# Patient Record
Sex: MALE | Race: BLACK OR AFRICAN AMERICAN | NOT HISPANIC OR LATINO | Employment: OTHER | URBAN - METROPOLITAN AREA
[De-identification: names, ages, dates, MRNs, and addresses within clinical notes are randomized per-mention and may not be internally consistent; named-entity substitution may affect disease eponyms.]

---

## 2017-01-03 ENCOUNTER — EPISODE CHANGES (OUTPATIENT)
Dept: HEPATOLOGY | Facility: CLINIC | Age: 77
End: 2017-01-03

## 2017-01-04 ENCOUNTER — EPISODE CHANGES (OUTPATIENT)
Dept: HEPATOLOGY | Facility: CLINIC | Age: 77
End: 2017-01-04

## 2017-01-04 ENCOUNTER — TELEPHONE (OUTPATIENT)
Dept: HEPATOLOGY | Facility: CLINIC | Age: 77
End: 2017-01-04

## 2017-01-04 ENCOUNTER — LAB VISIT (OUTPATIENT)
Dept: LAB | Facility: HOSPITAL | Age: 77
End: 2017-01-04
Attending: INTERNAL MEDICINE
Payer: MEDICARE

## 2017-01-04 DIAGNOSIS — B18.2 CHRONIC HEPATITIS C WITHOUT HEPATIC COMA: ICD-10-CM

## 2017-01-04 LAB
ALBUMIN SERPL BCP-MCNC: 3.6 G/DL
ALP SERPL-CCNC: 64 IU/L
ALT SERPL W/O P-5'-P-CCNC: 19 IU/L
ANION GAP SERPL CALC-SCNC: 10 MMOL/L
AST SERPL-CCNC: 23 IU/L
BILIRUB SERPL-MCNC: 0.7 MG/DL
BUN SERPL-MCNC: 21 MG/DL
CALCIUM SERPL-MCNC: 9.1 MG/DL
CHLORIDE SERPL-SCNC: 104 MMOL/L
CO2 SERPL-SCNC: 27 MMOL/L
CREAT SERPL-MCNC: 1.47 MG/DL
EST. GFR  (AFRICAN AMERICAN): 53.2 ML/MIN/1.73 M^2
EST. GFR  (NON AFRICAN AMERICAN): 46 ML/MIN/1.73 M^2
GLUCOSE SERPL-MCNC: 96 MG/DL
POTASSIUM SERPL-SCNC: 4.5 MMOL/L
PROT SERPL-MCNC: 7.3 G/DL
SODIUM SERPL-SCNC: 141 MMOL/L

## 2017-01-04 PROCEDURE — 80053 COMPREHEN METABOLIC PANEL: CPT | Mod: PO

## 2017-01-04 PROCEDURE — 36415 COLL VENOUS BLD VENIPUNCTURE: CPT | Mod: PO

## 2017-01-04 NOTE — TELEPHONE ENCOUNTER
HCV LAB REVIEW  Week 8 of Harvoni, planning on 12 weeks treatment      Pertinent labs:  CMP: stable, creatinine 1.47      pls call pt:  Labs look good. He looks mildly dehydrated and I would recommend he increase his water intake. 4 weeks left!    - Continue Harvoni - 1 pill daily - don't miss any doses.    Next labs due / pls schedule:  - CMP, HCV RNA at week 12 - end of treatment - 01/31/17

## 2017-01-05 NOTE — TELEPHONE ENCOUNTER
I spoke with patient and message from KALEE Campbell relayed.  It was stressed that he should hydrate.  EOT labs already scheduled.

## 2017-01-31 ENCOUNTER — EPISODE CHANGES (OUTPATIENT)
Dept: HEPATOLOGY | Facility: CLINIC | Age: 77
End: 2017-01-31

## 2017-01-31 ENCOUNTER — LAB VISIT (OUTPATIENT)
Dept: LAB | Facility: HOSPITAL | Age: 77
End: 2017-01-31
Attending: INTERNAL MEDICINE
Payer: MEDICARE

## 2017-01-31 DIAGNOSIS — B18.2 CHRONIC HEPATITIS C WITHOUT HEPATIC COMA: ICD-10-CM

## 2017-01-31 LAB
ALBUMIN SERPL BCP-MCNC: 3.8 G/DL
ALP SERPL-CCNC: 76 IU/L
ALT SERPL W/O P-5'-P-CCNC: 21 IU/L
ANION GAP SERPL CALC-SCNC: 9 MMOL/L
AST SERPL-CCNC: 22 IU/L
BILIRUB SERPL-MCNC: 0.5 MG/DL
BUN SERPL-MCNC: 21 MG/DL
CALCIUM SERPL-MCNC: 9.1 MG/DL
CHLORIDE SERPL-SCNC: 105 MMOL/L
CO2 SERPL-SCNC: 28 MMOL/L
CREAT SERPL-MCNC: 1.48 MG/DL
EST. GFR  (AFRICAN AMERICAN): 52.7 ML/MIN/1.73 M^2
EST. GFR  (NON AFRICAN AMERICAN): 45.6 ML/MIN/1.73 M^2
GLUCOSE SERPL-MCNC: 122 MG/DL
POTASSIUM SERPL-SCNC: 4.3 MMOL/L
PROT SERPL-MCNC: 7.7 G/DL
SODIUM SERPL-SCNC: 142 MMOL/L

## 2017-01-31 PROCEDURE — 80053 COMPREHEN METABOLIC PANEL: CPT | Mod: PO

## 2017-01-31 PROCEDURE — 87522 HEPATITIS C REVRS TRNSCRPJ: CPT | Mod: PO

## 2017-02-02 LAB
HCV LOG: <1.08 LOG (10) IU/ML
HCV RNA QUANT PCR: <12 IU/ML
HCV, QUALITATIVE: NOT DETECTED IU/ML

## 2017-02-03 ENCOUNTER — TELEPHONE (OUTPATIENT)
Dept: HEPATOLOGY | Facility: CLINIC | Age: 77
End: 2017-02-03

## 2017-02-03 DIAGNOSIS — K74.00 LIVER FIBROSIS: Primary | ICD-10-CM

## 2017-02-03 DIAGNOSIS — B18.2 CHRONIC HEPATITIS C WITHOUT HEPATIC COMA: ICD-10-CM

## 2017-02-03 NOTE — TELEPHONE ENCOUNTER
HCV LAB REVIEW  Week 12 of Harvoni, End of treatment       Pertinent labs:  CMP: stable, creatinine 1.48  HCV RNA: <12, not detected    pls call pt:  Labs look good. His HCV was not detected by the lab. We will check it again in 6 weeks and 12 weeks to determine a cure. There's still a small chance it could return. Fingers crossed. He should be out of Harvoni by this point, if not, take the few remaining doses left. He will be due for liver cancer screening 03/2017- please schedule     Next labs due/Please schedule:  HCV RNA, CBC, CMP, PT/INR, AFP, HBsAb, HBcAb, HAV IgG and U/S  in 6 weeks-SVR 6: 03/14/17  HCV RNA in 12 weeks- SVR 12:04/25/17

## 2017-03-02 ENCOUNTER — TELEPHONE (OUTPATIENT)
Dept: SURGERY | Facility: CLINIC | Age: 77
End: 2017-03-02

## 2017-03-02 NOTE — TELEPHONE ENCOUNTER
03/02/17 1624  Attempted to contact the pt regarding clinic visit reschedule due to Dr. Palumbo being in surgery. No answer. Left voicemail to return call. Will attempt to contact again at a later time.

## 2017-03-06 ENCOUNTER — TELEPHONE (OUTPATIENT)
Dept: SURGERY | Facility: CLINIC | Age: 77
End: 2017-03-06

## 2017-03-06 NOTE — TELEPHONE ENCOUNTER
03/06/17 1118  Pt called in requesting to reschedule 3 month follow up r/t exploratory lap due to transportation service needing notification 3 days prior to . Appointment rescheduled for 03/13/17 at 1:40pm. Date and time confirmed. Pt verbalized understanding.

## 2017-03-06 NOTE — TELEPHONE ENCOUNTER
03/06/17 1100  Spoke to pt regarding rescheduling post op appointment due to Dr. Palumbo being in surgery. Appointment rescheduled for 03/08/17 at 1pm. Date and time confirmed. Pt verbalized understanding.

## 2017-03-09 ENCOUNTER — TELEPHONE (OUTPATIENT)
Dept: SURGERY | Facility: CLINIC | Age: 77
End: 2017-03-09

## 2017-03-09 NOTE — TELEPHONE ENCOUNTER
03/09/17 1152  Attempted to contact the pt regardiing rescheduling post op appointment. No answer. Left voicemail to return call to the office. Will attempt to contact again at a later time.

## 2017-03-09 NOTE — TELEPHONE ENCOUNTER
03/09/17 1419  Spoke to pt regarding rescheduling appt on 3/13/17. Appt rescheduled for 03/17/17 at 11am. Pt states the transportation bus may decline him due to so many reschedules. I provided the number to the office, and informed the pt that if he had any trouble to tell them to call the office, and we could explain to them regarding appt rescheduling.

## 2017-03-14 ENCOUNTER — TELEPHONE (OUTPATIENT)
Dept: HEPATOLOGY | Facility: CLINIC | Age: 77
End: 2017-03-14

## 2017-03-14 ENCOUNTER — EPISODE CHANGES (OUTPATIENT)
Dept: HEPATOLOGY | Facility: CLINIC | Age: 77
End: 2017-03-14

## 2017-03-14 NOTE — TELEPHONE ENCOUNTER
Pt was scheduled to have labs drawn for today (3/14/17). Pt was NO SHOW. MA called pt to remind him of lab appt. Call unsuccessful. LVM with contact number for pt to return call.Lp

## 2017-03-15 ENCOUNTER — TELEPHONE (OUTPATIENT)
Dept: SURGERY | Facility: CLINIC | Age: 77
End: 2017-03-15

## 2017-03-15 NOTE — TELEPHONE ENCOUNTER
03/15/17 1606  Spoke to pt and informed him that Dr. Palumbo went on maternity leave a little earlier than expected, and that the  03/17/17 appt would need to be rescheduled. Pt states this is the 3rd time he has been rescheduled, but didn't know that  Palumbo was pregnant. I informed the pt that he would be seeing Dr. Lerner, and that we had an opening on 03/28/17 in the afternoon. Pt chose 03/28/17 at 1:20pm. Date and time confirmed. Pt verbalized understanding.

## 2017-03-21 NOTE — TELEPHONE ENCOUNTER
PT was  no show for lab & u/s appt that was scheduled for 3/14/17. MA called pt to reschedule missed appts. Call unsuccessful. Unable to leave voice message. No show reminder letter mailed on 3/21/17.Lp

## 2017-03-28 ENCOUNTER — TELEPHONE (OUTPATIENT)
Dept: SURGERY | Facility: CLINIC | Age: 77
End: 2017-03-28

## 2017-03-28 NOTE — TELEPHONE ENCOUNTER
03/28/17 1424  Spoke to pt regarding missed appt on 03/28/17 with Dr. Lerner. Pt states he got busy, and couldn't make it back in time. Appt rescheduled for 04/13/17 at 2:20pm. Date and time of appt confirmed. Pt verbalized understanding, and requests that a letter be sent to remind him. Pt informed that I would send the letter, but that I wasn't sure if it would reach him in time to set up transportation. Pt verbalized understanding, and states if not just give him a call to remind him so he can call transportation which needs notice for  3 days prior to appt. Letter sent today.

## 2017-03-29 ENCOUNTER — HOSPITAL ENCOUNTER (OUTPATIENT)
Dept: RADIOLOGY | Facility: HOSPITAL | Age: 77
Discharge: HOME | End: 2017-03-29
Attending: INTERNAL MEDICINE
Payer: MEDICARE

## 2017-03-29 ENCOUNTER — TELEPHONE (OUTPATIENT)
Dept: HEPATOLOGY | Facility: CLINIC | Age: 77
End: 2017-03-29

## 2017-03-29 DIAGNOSIS — K74.00 LIVER FIBROSIS: ICD-10-CM

## 2017-03-29 DIAGNOSIS — B18.2 CHRONIC HEPATITIS C WITHOUT HEPATIC COMA: ICD-10-CM

## 2017-03-29 PROCEDURE — 76705 ECHO EXAM OF ABDOMEN: CPT | Mod: TC,PO

## 2017-03-29 NOTE — TELEPHONE ENCOUNTER
Eden with the lab called to report a panic glucose reading of 48.  I spoke with patient.  He had blood drawn at [facility] on 3/29/17.  He states that he was fasting prior to draw.  He reported feeling week this am but states that he went straight home and ate a full breakfast.  He reports feeling great now and states that he's about to go dancing.  Patient unable to provide a glucose reading because he doesn't have test strips.

## 2017-04-03 ENCOUNTER — EPISODE CHANGES (OUTPATIENT)
Dept: HEPATOLOGY | Facility: CLINIC | Age: 77
End: 2017-04-03

## 2017-04-03 ENCOUNTER — TELEPHONE (OUTPATIENT)
Dept: HEPATOLOGY | Facility: CLINIC | Age: 77
End: 2017-04-03

## 2017-04-03 DIAGNOSIS — K74.00 LIVER FIBROSIS: Primary | ICD-10-CM

## 2017-04-03 NOTE — TELEPHONE ENCOUNTER
HCV LAB REVIEW  SVR 6 and HCC screening       Pertinent labs:  CMP: stable   CMP: stable, glucose <50, pt called   PT/INR: WNL  AFP: WNL  (+) immunity to HAV and HBV, does not need vaccination   HCV RNA: <12, not detected    U/S stable with no masses or lesions     pls call pt:  Labs look good. His HCV was not detected by the lab. We will check it again in 6 weeks  to determine a cure. There's still a small chance it could return. His liver labs and U/S were fine. He will be due for repeat liver cancer screening in 6 months.     Next labs due/Please schedule:  HCV RNA in 12 weeks- SVR 12:04/25/17    HCC screening and follow up visit with me: 09/2017

## 2017-04-10 ENCOUNTER — OFFICE VISIT (OUTPATIENT)
Dept: SURGERY | Facility: CLINIC | Age: 77
End: 2017-04-10
Payer: MEDICARE

## 2017-04-10 VITALS
WEIGHT: 190.69 LBS | HEART RATE: 64 BPM | DIASTOLIC BLOOD PRESSURE: 86 MMHG | SYSTOLIC BLOOD PRESSURE: 135 MMHG | TEMPERATURE: 98 F | BODY MASS INDEX: 28.24 KG/M2 | HEIGHT: 69 IN

## 2017-04-10 DIAGNOSIS — B18.2 CHRONIC HEPATITIS C WITHOUT HEPATIC COMA: ICD-10-CM

## 2017-04-10 DIAGNOSIS — M62.08 DIASTASIS RECTI: ICD-10-CM

## 2017-04-10 DIAGNOSIS — K43.2 INCISIONAL HERNIA, WITHOUT OBSTRUCTION OR GANGRENE: Primary | ICD-10-CM

## 2017-04-10 DIAGNOSIS — R10.33 PERIUMBILICAL ABDOMINAL PAIN: ICD-10-CM

## 2017-04-10 DIAGNOSIS — Z90.49 S/P COLECTOMY: ICD-10-CM

## 2017-04-10 PROCEDURE — 99215 OFFICE O/P EST HI 40 MIN: CPT | Mod: S$GLB,,, | Performed by: SURGERY

## 2017-04-10 PROCEDURE — 1125F AMNT PAIN NOTED PAIN PRSNT: CPT | Mod: S$GLB,,, | Performed by: SURGERY

## 2017-04-10 PROCEDURE — 1160F RVW MEDS BY RX/DR IN RCRD: CPT | Mod: S$GLB,,, | Performed by: SURGERY

## 2017-04-10 PROCEDURE — 1159F MED LIST DOCD IN RCRD: CPT | Mod: S$GLB,,, | Performed by: SURGERY

## 2017-04-10 PROCEDURE — 3079F DIAST BP 80-89 MM HG: CPT | Mod: S$GLB,,, | Performed by: SURGERY

## 2017-04-10 PROCEDURE — 1157F ADVNC CARE PLAN IN RCRD: CPT | Mod: S$GLB,,, | Performed by: SURGERY

## 2017-04-10 PROCEDURE — 99999 PR PBB SHADOW E&M-EST. PATIENT-LVL III: CPT | Mod: PBBFAC,,, | Performed by: SURGERY

## 2017-04-10 PROCEDURE — 4010F ACE/ARB THERAPY RXD/TAKEN: CPT | Mod: S$GLB,,, | Performed by: SURGERY

## 2017-04-10 PROCEDURE — 3046F HEMOGLOBIN A1C LEVEL >9.0%: CPT | Mod: S$GLB,,, | Performed by: SURGERY

## 2017-04-10 PROCEDURE — 3075F SYST BP GE 130 - 139MM HG: CPT | Mod: S$GLB,,, | Performed by: SURGERY

## 2017-04-10 NOTE — MR AVS SNAPSHOT
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 [phone]    2017 10:30 AM RVPH CT1 LIMIT 350 LBS [facility] [phone]      Goals (5 Years of Data)     None      [facility]     [facility] Nurse Care Line -  Assistance  Unless otherwise directed by your provider, please contact [facility], our nurse care line that is available for  assistance.     Registered nurses in the [facility] provide: appointment scheduling, clinical advisement, health education, and other advisory services.  Call: [phone] (toll free)               Medications           Message regarding Medications     Verify the changes and/or additions to your medication regime listed below are the same as discussed with your clinician today.  If any of these changes or additions are incorrect, please notify your healthcare provider.             Verify that the below list of medications is an accurate representation of the medications you are currently taking.  If none reported, the list may be blank. If incorrect, please contact your healthcare provider. Carry this list with you in case of emergency.           Current  "Medications     amlodipine (NORVASC) 10 MG tablet Take 1 tablet (10 mg total) by mouth once daily.    aspirin (ECOTRIN) 325 MG EC tablet Take 325 mg by mouth once daily.    BD INSULIN PEN NEEDLE UF SHORT 31 gauge x 5/16" Ndle     gabapentin (NEURONTIN) 300 MG capsule One po hs x 5 days then one twice daily for back pain/sciatica    hydrocodone-acetaminophen 7.5-325mg (NORCO) 7.5-325 mg per tablet Take 1 tablet by mouth every 6 to 8 hours as needed for Pain.    ibuprofen (ADVIL,MOTRIN) 800 MG tablet Take 1 tablet (800 mg total) by mouth every 6 (six) hours as needed for Pain.    insulin glargine (LANTUS) 100 unit/mL injection Inject 15 Units into the skin every evening.    insulin syringe-needle U-100 0.3 mL 30 gauge x 5/16 Syrg     losartan (COZAAR) 50 MG tablet Take 1 tablet (50 mg total) by mouth once daily.    simvastatin (ZOCOR) 40 MG tablet Take 1 tablet (40 mg total) by mouth every evening.    sitagliptin (JANUVIA) 100 MG Tab Take 1 tablet (100 mg total) by mouth once daily.    docusate sodium (COLACE) 100 MG capsule Take 1 capsule (100 mg total) by mouth 2 (two) times daily as needed for Constipation.           Clinical Reference Information           Your Vitals Were     BP Pulse Temp Height Weight BMI    135/86 (BP Location: Right arm, Patient Position: Sitting) 64 97.9 °F (36.6 °C) (Oral) 5' 9" (1.753 m) 86.5 kg (190 lb 11.2 oz) 28.16 kg/m2      Blood Pressure          Most Recent Value    BP  135/86      Allergies as of 4/10/2017     No Known Allergies      Immunizations Administered on Date of Encounter - 4/10/2017     None      Orders Placed During Today's Visit     Future Labs/Procedures Expected by Expires    CT Abdomen Pelvis  Without Contrast  4/10/2017 4/10/2018    Hemoglobin A1c  4/10/2017 6/9/2018      [facility] Sign-Up     Activating your [facility] account is as easy as 1-2-3!     1) Visit [email or web address], select Sign Up Now, enter this activation code and your date of birth, then select " Next.  [email or web address]  Expires: 5/25/2017  1:49 PM      2) Create a username and password to use when you visit [employer] in the future and select a security question in case you lose your password and select Next.    3) Enter your e-mail address and click Sign Up!    Additional Information  If you have questions, please e-mail [email or web address] or call [phone] to talk to our [facility] staff. Remember, [facility] is NOT to be used for urgent needs. For medical emergencies, dial 911.         Smoking Cessation     If you would like to quit smoking:   You may be eligible for free services if you are a [place] resident and started smoking cigarettes before September 1, 1988.  Call the Smoking Cessation Trust ([facility]) toll free at (702) 689-6812 or (545) 875-6178.   Call 1-800-QUIT-NOW if you do not meet the above criteria.   Contact us via email: [email or web address]   View our website for more information: [email or web address]        Language Assistance Services     ATTENTION: Language assistance services are available, free of charge. Please call [phone].      ATENCIÓN: Si habla ashokañol, tiene a encarnacion disposición servicios gratuitos de asistencia lingüística. Llame al [phone].     CHÚ Ý: N?u b?n nói Ti?ng Vi?t, có các d?ch v? h? tr? ngôn ng? mi?n phí dành cho b?n. G?i s? [phone].         [facility] complies with applicable Federal civil rights laws and does not discriminate on the basis of race, color, national origin, age, disability, or sex.

## 2017-04-10 NOTE — PROGRESS NOTES
Subjective:       Patient ID: Jose Roca is a 76 y.o. male.    Chief Complaint: No chief complaint on file.    HPI   Patient is status post ex-lap with Dr. Palumbo, September 2016.  He reports noticing a bulge to the left and above his umbilicus approximately 3 months ago.  He has intermittent discomfort in this region when he coughs.  No nausea or vomiting.  Normal daily bowel movements, without exertion.  Passing flatus.  He has completed his hepatitis C treatment of Harvoni.  He states his weight has been up and down, but has a good appetite.  He has diabetes and checks his glucose only at night.  He has no other complaints and presents alone to clinic today.    Review of Systems    All systems were reviewed and are negative, except that mentioned in the HPI.    Objective:      Physical Exam   Constitutional: He is oriented to person, place, and time. He appears well-developed and well-nourished. No distress.   HENT:   Head: Normocephalic and atraumatic.   Eyes: No scleral icterus.   Neck: Normal range of motion. Neck supple. No JVD present.   Cardiovascular: Normal rate and regular rhythm.    Pulmonary/Chest: Effort normal and breath sounds normal. No respiratory distress.   Abdominal: Soft. Bowel sounds are normal. He exhibits no distension and no mass. There is no tenderness. There is no rebound and no guarding. Hernia: left-sided reducible hernia palpated above the umbilicus, ~3cm x 3cm, nttp, no overlying skin changes    Well-healed midline incision noted.  Diastases recti noted.   Musculoskeletal: Normal range of motion. He exhibits no edema or tenderness.   Neurological: He is alert and oriented to person, place, and time. No cranial nerve deficit.   Skin: Skin is warm and dry. He is not diaphoretic.   Psychiatric: He has a normal mood and affect.         Lab Results   Component Value Date    WBC 9.64 03/29/2017    HGB 13.1 (L) 03/29/2017    HCT 40.0 03/29/2017    MCV 89 03/29/2017     03/29/2017      CMP  Sodium   Date Value Ref Range Status   03/29/2017 143 136 - 145 mmol/L Final     Potassium   Date Value Ref Range Status   03/29/2017 4.4 3.5 - 5.1 mmol/L Final     Chloride   Date Value Ref Range Status   03/29/2017 104 95 - 110 mmol/L Final     CO2   Date Value Ref Range Status   03/29/2017 30 (H) 23 - 29 mmol/L Final     Glucose   Date Value Ref Range Status   03/29/2017 48 (LL) 70 - 110 mg/dL Final     Comment:     Glucose critical result(s) called and verbal readback obtained from   Nayla Gaytan., 03/29/2017 14:24       BUN ([place])   Date Value Ref Range Status   03/05/2015 12 2 - 20 mg/dL Final     BUN, Bld   Date Value Ref Range Status   03/29/2017 18 2 - 20 mg/dL Final     Creatinine   Date Value Ref Range Status   03/29/2017 1.46 (H) 0.50 - 1.40 mg/dL Final     Calcium   Date Value Ref Range Status   03/29/2017 9.3 8.7 - 10.5 mg/dL Final     Total Protein   Date Value Ref Range Status   03/29/2017 7.8 6.0 - 8.4 g/dL Final     Albumin   Date Value Ref Range Status   03/29/2017 4.0 3.5 - 5.2 g/dL Final     Total Bilirubin   Date Value Ref Range Status   03/29/2017 1.0 0.1 - 1.0 mg/dL Final     Comment:     For infants and newborns, interpretation of results should be based  on gestational age, weight and in agreement with clinical  observations.  Premature Infant recommended reference ranges:  Up to 24 hours.............<8.0 mg/dL  Up to 48 hours............<12.0 mg/dL  3-5 days..................<15.0 mg/dL  6-29 days.................<15.0 mg/dL       Alkaline Phosphatase   Date Value Ref Range Status   03/29/2017 72 38 - 126 IU/L Final     AST   Date Value Ref Range Status   03/29/2017 21 15 - 46 IU/L Final     ALT   Date Value Ref Range Status   03/29/2017 21 10 - 44 IU/L Final     Anion Gap   Date Value Ref Range Status   03/29/2017 9 8 - 16 mmol/L Final     eGFR if    Date Value Ref Range Status   03/29/2017 53.6 (A) >60 mL/min/1.73 m^2 Final     eGFR if non     Date Value Ref Range Status   03/29/2017 46.4 (A) >60 mL/min/1.73 m^2 Final     Comment:     Calculation used to obtain the estimated glomerular filtration  rate (eGFR) is the CKD-EPI equation. Since race is unknown   in our information system, the eGFR values for   -American and Non--American patients are given   for each creatinine result.         Lab Results   Component Value Date    HGBA1C 9.2 (H) 09/20/2016         U/S 3/29/17: images and report reviewed.  The gallbladder is absent.  Otherwise, normal study.    Assessment:       1. Incisional hernia, without obstruction or gangrene    2. S/P colectomy    3. Chronic hepatitis C without hepatic coma    4. 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 Periumbilical abdominal pain    6. Diastasis recti        Plan:   Patient is known to Dr. 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  We discussed the pathology of hernias.  I encouraged him to perform Accu-Checks more frequently, ideally 4 times a day, not just qhs.  Hemoglobin A1c was ordered at this visit.  We also discussed weight loss.  I will order a CT with oral contrast to further evaluate his abdominal wall and incisional hernia.  We will call him with the results and he will follow-up with Dr. Palumbo in 4-6 weeks.  We discussed signs and symptoms of hernia incarceration and strangulation.  He will call or return to clinic sooner with any questions or problems. All of his questions were answered.

## 2017-04-25 ENCOUNTER — EPISODE CHANGES (OUTPATIENT)
Dept: HEPATOLOGY | Facility: CLINIC | Age: 77
End: 2017-04-25

## 2017-04-26 ENCOUNTER — EPISODE CHANGES (OUTPATIENT)
Dept: HEPATOLOGY | Facility: CLINIC | Age: 77
End: 2017-04-26

## 2017-05-17 ENCOUNTER — TELEPHONE (OUTPATIENT)
Dept: HEPATOLOGY | Facility: CLINIC | Age: 77
End: 2017-05-17

## 2017-05-17 ENCOUNTER — EPISODE CHANGES (OUTPATIENT)
Dept: HEPATOLOGY | Facility: CLINIC | Age: 77
End: 2017-05-17

## 2017-05-17 NOTE — TELEPHONE ENCOUNTER
Patient did not have SVR 12 labs drawn on 4/25/17 as ordered.  Letter sent requesting that he call our office to reschedule draw.

## 2017-05-22 ENCOUNTER — TELEPHONE (OUTPATIENT)
Dept: HEPATOLOGY | Facility: CLINIC | Age: 77
End: 2017-05-22

## 2017-05-22 NOTE — TELEPHONE ENCOUNTER
----- Message from Annie Arenas sent at 5/19/2017 12:24 PM CDT -----  Contact: pt   Calling to reschedule 4/28 appts, please call

## 2017-05-22 NOTE — TELEPHONE ENCOUNTER
I spoke with patient.  He asked that CT and HCV RNA be scheduled to 6/2/17; done and reminder notice mailed.

## 2017-07-06 ENCOUNTER — TELEPHONE (OUTPATIENT)
Dept: HEPATOLOGY | Facility: CLINIC | Age: 77
End: 2017-07-06

## 2017-07-06 ENCOUNTER — EPISODE CHANGES (OUTPATIENT)
Dept: HEPATOLOGY | Facility: CLINIC | Age: 77
End: 2017-07-06

## 2017-07-06 NOTE — TELEPHONE ENCOUNTER
He was a no show for SVR 12 labs, please attempt to reschedule. HCC screening will be due with follow up in 09/2017, please enter recall

## 2017-07-12 ENCOUNTER — EPISODE CHANGES (OUTPATIENT)
Dept: HEPATOLOGY | Facility: CLINIC | Age: 77
End: 2017-07-12

## 2017-07-12 ENCOUNTER — LAB VISIT (OUTPATIENT)
Dept: LAB | Facility: HOSPITAL | Age: 77
End: 2017-07-12
Attending: INTERNAL MEDICINE
Payer: MEDICARE

## 2017-07-12 DIAGNOSIS — B18.2 CHRONIC HEPATITIS C WITHOUT HEPATIC COMA: ICD-10-CM

## 2017-07-12 DIAGNOSIS — K74.00 LIVER FIBROSIS: ICD-10-CM

## 2017-07-12 PROCEDURE — 36415 COLL VENOUS BLD VENIPUNCTURE: CPT | Mod: PO

## 2017-07-12 PROCEDURE — 87522 HEPATITIS C REVRS TRNSCRPJ: CPT | Mod: PO

## 2017-07-17 LAB
HCV LOG: <1.08 LOG (10) IU/ML
HCV RNA QUANT PCR: <12 IU/ML
HCV, QUALITATIVE: NOT DETECTED IU/ML

## 2017-07-18 ENCOUNTER — TELEPHONE (OUTPATIENT)
Dept: HEPATOLOGY | Facility: CLINIC | Age: 77
End: 2017-07-18

## 2017-07-18 DIAGNOSIS — Z86.19 HISTORY OF HEPATITIS C: Primary | ICD-10-CM

## 2017-07-18 NOTE — TELEPHONE ENCOUNTER
HCV LAB REVIEW  SVR 23    Pertinent labs:  HCV RNA: <12, not detected    Results discussed with patient. Pt sounded intoxicated and reported he was going drinking for birthday. Reminded of liver fibrosis and cautioned alcohol use.     Next labs due/Please schedule:  HCV RNA in 1 year- SVR 12:04/25/17  HCC screening already scheduled

## 2017-09-14 ENCOUNTER — OFFICE VISIT (OUTPATIENT)
Dept: HEPATOLOGY | Facility: CLINIC | Age: 77
End: 2017-09-14
Payer: MEDICARE

## 2017-09-14 ENCOUNTER — TELEPHONE (OUTPATIENT)
Dept: HEPATOLOGY | Facility: CLINIC | Age: 77
End: 2017-09-14

## 2017-09-14 ENCOUNTER — HOSPITAL ENCOUNTER (OUTPATIENT)
Dept: RADIOLOGY | Facility: HOSPITAL | Age: 77
Discharge: HOME | End: 2017-09-14
Attending: INTERNAL MEDICINE
Payer: MEDICARE

## 2017-09-14 VITALS
OXYGEN SATURATION: 98 % | SYSTOLIC BLOOD PRESSURE: 150 MMHG | TEMPERATURE: 97 F | HEART RATE: 63 BPM | HEIGHT: 70 IN | DIASTOLIC BLOOD PRESSURE: 76 MMHG | RESPIRATION RATE: 18 BRPM | WEIGHT: 204.13 LBS | BODY MASS INDEX: 29.22 KG/M2

## 2017-09-14 DIAGNOSIS — K74.00 LIVER FIBROSIS: ICD-10-CM

## 2017-09-14 DIAGNOSIS — Z86.19 HISTORY OF HEPATITIS C: Primary | ICD-10-CM

## 2017-09-14 PROCEDURE — 1159F MED LIST DOCD IN RCRD: CPT | Mod: S$GLB,,, | Performed by: PHYSICIAN ASSISTANT

## 2017-09-14 PROCEDURE — 3078F DIAST BP <80 MM HG: CPT | Mod: S$GLB,,, | Performed by: PHYSICIAN ASSISTANT

## 2017-09-14 PROCEDURE — 76705 ECHO EXAM OF ABDOMEN: CPT | Mod: TC

## 2017-09-14 PROCEDURE — 3008F BODY MASS INDEX DOCD: CPT | Mod: S$GLB,,, | Performed by: PHYSICIAN ASSISTANT

## 2017-09-14 PROCEDURE — 76705 ECHO EXAM OF ABDOMEN: CPT | Mod: 26,,, | Performed by: RADIOLOGY

## 2017-09-14 PROCEDURE — 1125F AMNT PAIN NOTED PAIN PRSNT: CPT | Mod: S$GLB,,, | Performed by: PHYSICIAN ASSISTANT

## 2017-09-14 PROCEDURE — 3077F SYST BP >= 140 MM HG: CPT | Mod: S$GLB,,, | Performed by: PHYSICIAN ASSISTANT

## 2017-09-14 PROCEDURE — 99214 OFFICE O/P EST MOD 30 MIN: CPT | Mod: S$GLB,,, | Performed by: PHYSICIAN ASSISTANT

## 2017-09-14 PROCEDURE — 99999 PR PBB SHADOW E&M-EST. PATIENT-LVL IV: CPT | Mod: PBBFAC,,, | Performed by: PHYSICIAN ASSISTANT

## 2017-09-14 NOTE — TELEPHONE ENCOUNTER
Attempt made to reach patient.  Message from KALEE Campbell mailed to him.  Recall entered for 3/2018 HCC screening.

## 2017-09-14 NOTE — TELEPHONE ENCOUNTER
----- Message from Dom Campbell PA-C sent at 9/14/2017  3:22 PM CDT -----  Please let him know these labs are stable.

## 2017-09-14 NOTE — TELEPHONE ENCOUNTER
----- Message from Dom Campbell PA-C sent at 9/14/2017  2:04 PM CDT -----  Please let him know that his U/S from today looks fine. He will be due for repeat in 6 months    Thanks
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
## 2018-01-16 ENCOUNTER — HOSPITAL ENCOUNTER (EMERGENCY)
Facility: HOSPITAL | Age: 78
Discharge: HOME | End: 2018-01-16
Attending: EMERGENCY MEDICINE
Payer: MEDICARE

## 2018-01-16 VITALS
DIASTOLIC BLOOD PRESSURE: 76 MMHG | WEIGHT: 197 LBS | HEART RATE: 65 BPM | BODY MASS INDEX: 28.2 KG/M2 | TEMPERATURE: 98 F | OXYGEN SATURATION: 100 % | SYSTOLIC BLOOD PRESSURE: 131 MMHG | RESPIRATION RATE: 20 BRPM | HEIGHT: 70 IN

## 2018-01-16 DIAGNOSIS — R42 VERTIGO: Primary | ICD-10-CM

## 2018-01-16 DIAGNOSIS — J32.9 CHRONIC SINUSITIS, UNSPECIFIED LOCATION: ICD-10-CM

## 2018-01-16 LAB — POCT GLUCOSE: 151 MG/DL (ref 70–110)

## 2018-01-16 PROCEDURE — 82962 GLUCOSE BLOOD TEST: CPT

## 2018-01-16 PROCEDURE — 99284 EMERGENCY DEPT VISIT MOD MDM: CPT | Mod: 25

## 2018-01-16 RX ORDER — MECLIZINE HYDROCHLORIDE 25 MG/1
25 TABLET ORAL 3 TIMES DAILY PRN
Qty: 20 TABLET | Refills: 0 | Status: SHIPPED | OUTPATIENT
Start: 2018-01-16 | End: 2021-10-08

## 2018-01-16 RX ORDER — AMOXICILLIN AND CLAVULANATE POTASSIUM 875; 125 MG/1; MG/1
1 TABLET, FILM COATED ORAL 2 TIMES DAILY
Qty: 20 TABLET | Refills: 0 | Status: SHIPPED | OUTPATIENT
Start: 2018-01-16 | End: 2018-01-26

## 2018-01-16 RX ORDER — FLUTICASONE PROPIONATE 50 MCG
1 SPRAY, SUSPENSION (ML) NASAL DAILY
Qty: 15 G | Refills: 0 | Status: SHIPPED | OUTPATIENT
Start: 2018-01-16 | End: 2022-03-28

## 2018-01-16 NOTE — ED TRIAGE NOTES
Pt presents to ED via [facility] with c/o dizziness for 3 days, pt states that it feels like the room is spinning. Denies nausea, denies vomiting. Denies cp or sob.

## 2018-01-16 NOTE — ED PROVIDER NOTES
Encounter Date: 1/16/2018       History     Chief Complaint   Patient presents with    Dizziness     arrives per EMS with dizziness x 3 days. described as everything is spinning.      This patient is a 77-year-old male.  He has a history of diabetes mellitus.  No prior history of cervical vascular disease.  For the last 3 days, has been experiencing vertigo, feels like the room is spinning.  No associated nausea, vomiting, weakness, paresthesias, difficulty speaking, or new visual disturbance.  He is blind in the right eye, but has not had any changes in vision of his left eye.  No other neurologic symptoms.  Has not had a headache, no head trauma.  No fevers chills stiff neck or rash.  Has not been experiencing any URI symptoms, denies congestion, cough, wheezing, or flulike symptoms.          Review of patient's allergies indicates:  No Known Allergies  Past Medical History:   Diagnosis Date    Diabetes mellitus, type 2     High cholesterol     History of hepatitis C; S/p RX with SVR 23 documented 07/2017 7/18/2017    Hypertension      Past Surgical History:   Procedure Laterality Date    CHOLECYSTECTOMY      CORONARY STENT PLACEMENT      EXPLORATORY LAPAROTOMY W/ BOWEL RESECTION  09/19/2016    ileocecectomy     History reviewed. No pertinent family history.  Social History   Substance Use Topics    Smoking status: Current Every Day Smoker     Packs/day: 0.50     Types: Cigarettes    Smokeless tobacco: Never Used      Comment: APPROX 3 CIGARETTES A DAY    Alcohol use No     Review of Systems   Constitutional: Negative for chills and fever.   HENT: Negative for congestion, ear pain, rhinorrhea and sore throat.    Eyes: Negative for visual disturbance.        Blind in the right eye   Respiratory: Negative for cough, shortness of breath and wheezing.    Cardiovascular: Negative for chest pain, palpitations and leg swelling.   Gastrointestinal: Negative for abdominal pain, diarrhea, nausea and vomiting.    Endocrine: Negative for polyuria.        Insulin-dependent diabetes   Genitourinary: Negative for difficulty urinating and dysuria.   Musculoskeletal: Negative for arthralgias and myalgias.   Skin: Negative for rash.   Allergic/Immunologic: Positive for immunocompromised state (diabetic).   Neurological: Positive for dizziness. Negative for seizures, syncope, weakness and numbness.   Hematological: Does not bruise/bleed easily.        No history of malignancy   Psychiatric/Behavioral: Negative for confusion.       Physical Exam     Initial Vitals [01/16/18 1409]   BP Pulse Resp Temp SpO2   (!) 153/94 76 19 98.2 °F (36.8 °C) 97 %      MAP       113.67         Physical Exam    Nursing note and vitals reviewed.  Constitutional: He appears well-developed and well-nourished. He is not diaphoretic. No distress.   HENT:   Head: Normocephalic.   Right Ear: External ear normal.   Left Ear: External ear normal.   Nose: Nose normal.   Mouth/Throat: Oropharynx is clear and moist.   No sinus tenderness over the paranasal sinuses, or over the mastoids   Eyes:   Right cornea is opacified  Left pupil round and reactive   Neck: No JVD present.   Cardiovascular: Normal rate, regular rhythm, normal heart sounds and intact distal pulses.   No murmur heard.  Pulmonary/Chest: Breath sounds normal. No stridor. No respiratory distress. He has no wheezes.   Abdominal: Soft. He exhibits no distension. There is no tenderness.   Musculoskeletal: He exhibits no edema or tenderness.   Neurological: He is alert. He has normal strength. No cranial nerve deficit or sensory deficit.   There is no nystagmus, EOM of the left eye are intact.  No facial numbness.  Tongue protrudes in the midline.  Normal shoulder shrug.  Equal  strength, able to lift each leg off the bed forcefully against resistance, normal sensation to light touch all 4 extremities   Skin: Skin is warm and dry.         ED Course   Procedures  Labs Reviewed - No data to display           Medical Decision Making:   Initial Assessment:   This patient has developed some vertigo, which is been constant for the last 3 days.  CT scan of the head shows no acute hemorrhage, no evidence of acute infarct.  He does have some maxillary sinusitis, and fluid in the mastoids on both sides.  He has not been experiencing congestion, facial pain, fevers, chills, or ear pain to coincide with the findings on CT, so this probably represents chronic sinusitis.  I will place him on Augmentin, Flonase, and he should follow-up with Dr. Bennett in 2 days.  Meclizine as needed for vertigo.                 Imaging Results          CT Head Without Contrast (Final result)  Result time 01/16/18 14:58:21    Final result by Juanjose Kenney MD (01/16/18 14:58:21)                 Impression:         Fluid level is seen within the left maxillary sinuses which could reflect acute sinusitis.    Fluid within the mastoid air cells, left greater than right which may account for the vertigo and may reflect chronic mastoiditis.    All CT scans at this facility use dose modulation, iterative reconstruction and/or weight based dosing when appropriate to reduce radiation dose to as low as reasonably achievable.        Electronically signed by: JUANJOSE KENNEY MD  Date:     01/16/18  Time:    14:58              Narrative:    CT OF THE HEAD WITHOUT CONTRAST:     Technique: 5 mm axial images were obtained from the skullbase to the vertex, without administration of contrast.    Comparison: None.    History:  Vertigo    Findings:    No intracranial hemorrhage, extra-axial fluid collection, midline shift, or mass effect.  No evidence of an acute cortical infarct or of an infarct in a major vascular territory.    There is mild prominence of the ventricles and sulci compatible with diffuse cerebral volume loss.  Patchy hypoattenuation in the periventricular white matter regions is nonspecific, but most commonly seen with chronic microvascular  ischemic changes. Vascular calcifications noted.    The calvarium is unremarkable. Fluid level is seen within the left maxillary sinuses which could reflect acute sinusitis. Remaining visualized sinuses are grossly clear. There is fluid within the mastoid air cells, left greater than right which may account for the vertigo and may reflect chronic mastoiditis. Visualized orbits are unremarkable.                               ED Course      Clinical Impression:   The primary encounter diagnosis was Vertigo. A diagnosis of Chronic sinusitis, unspecified location was also pertinent to this visit.    Disposition:   Disposition: Discharged  Condition: Stable                        Atif Nice MD  01/16/18 6470

## 2018-01-22 ENCOUNTER — TELEPHONE (OUTPATIENT)
Dept: HEPATOLOGY | Facility: CLINIC | Age: 78
End: 2018-01-22

## 2018-01-22 NOTE — TELEPHONE ENCOUNTER
----- Message from Mariam Melton sent at 1/22/2018  1:10 PM CST -----  Contact: Pt  Please call patient to schedule appt in March.     Call

## 2018-03-21 ENCOUNTER — HOSPITAL ENCOUNTER (OUTPATIENT)
Dept: RADIOLOGY | Facility: HOSPITAL | Age: 78
Discharge: HOME | End: 2018-03-21
Attending: PHYSICIAN ASSISTANT
Payer: MEDICARE

## 2018-03-21 ENCOUNTER — OFFICE VISIT (OUTPATIENT)
Dept: HEPATOLOGY | Facility: CLINIC | Age: 78
End: 2018-03-21
Payer: MEDICARE

## 2018-03-21 VITALS
SYSTOLIC BLOOD PRESSURE: 161 MMHG | HEART RATE: 58 BPM | OXYGEN SATURATION: 100 % | TEMPERATURE: 96 F | DIASTOLIC BLOOD PRESSURE: 78 MMHG | WEIGHT: 210.31 LBS | BODY MASS INDEX: 30.11 KG/M2 | HEIGHT: 70 IN | RESPIRATION RATE: 18 BRPM

## 2018-03-21 DIAGNOSIS — Z86.19 HISTORY OF HEPATITIS C: ICD-10-CM

## 2018-03-21 DIAGNOSIS — K74.00 LIVER FIBROSIS: ICD-10-CM

## 2018-03-21 DIAGNOSIS — K74.60 CIRRHOSIS OF LIVER WITHOUT ASCITES, UNSPECIFIED HEPATIC CIRRHOSIS TYPE: Primary | ICD-10-CM

## 2018-03-21 PROCEDURE — 3077F SYST BP >= 140 MM HG: CPT | Mod: CPTII,S$GLB,, | Performed by: PHYSICIAN ASSISTANT

## 2018-03-21 PROCEDURE — 99999 PR PBB SHADOW E&M-EST. PATIENT-LVL IV: CPT | Mod: PBBFAC,,, | Performed by: PHYSICIAN ASSISTANT

## 2018-03-21 PROCEDURE — 76705 ECHO EXAM OF ABDOMEN: CPT | Mod: 26,,, | Performed by: RADIOLOGY

## 2018-03-21 PROCEDURE — 99214 OFFICE O/P EST MOD 30 MIN: CPT | Mod: S$GLB,,, | Performed by: PHYSICIAN ASSISTANT

## 2018-03-21 PROCEDURE — 76705 ECHO EXAM OF ABDOMEN: CPT | Mod: TC

## 2018-03-21 PROCEDURE — 3078F DIAST BP <80 MM HG: CPT | Mod: CPTII,S$GLB,, | Performed by: PHYSICIAN ASSISTANT

## 2018-03-21 RX ORDER — VALSARTAN AND HYDROCHLOROTHIAZIDE 160; 12.5 MG/1; MG/1
1 TABLET, FILM COATED ORAL DAILY
COMMUNITY
Start: 2018-02-23 | End: 2018-03-21 | Stop reason: CLARIF

## 2018-03-21 RX ORDER — VALSARTAN AND HYDROCHLOROTHIAZIDE 160; 12.5 MG/1; MG/1
1 TABLET, FILM COATED ORAL DAILY
Status: ON HOLD | COMMUNITY
End: 2018-09-05

## 2018-03-21 RX ORDER — GLIMEPIRIDE 4 MG/1
4 TABLET ORAL DAILY
Status: ON HOLD | COMMUNITY
Start: 2018-01-30 | End: 2018-11-26 | Stop reason: HOSPADM
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
## 2018-03-22 ENCOUNTER — TELEPHONE (OUTPATIENT)
Dept: HEPATOLOGY | Facility: CLINIC | Age: 78
End: 2018-03-22

## 2018-03-22 NOTE — TELEPHONE ENCOUNTER
I spoke with patient and message from KALEE Campbell relayed.  It was stressed that he increase fluids and f/u with PCP regarding elevated kidney numbers.

## 2018-04-03 ENCOUNTER — TELEPHONE (OUTPATIENT)
Dept: GASTROENTEROLOGY | Facility: CLINIC | Age: 78
End: 2018-04-03

## 2018-06-12 ENCOUNTER — TELEPHONE (OUTPATIENT)
Dept: GASTROENTEROLOGY | Facility: CLINIC | Age: 78
End: 2018-06-12

## 2018-08-14 ENCOUNTER — OFFICE VISIT (OUTPATIENT)
Dept: SURGERY | Facility: CLINIC | Age: 78
End: 2018-08-14
Payer: MEDICARE

## 2018-08-14 VITALS
OXYGEN SATURATION: 98 % | HEART RATE: 72 BPM | SYSTOLIC BLOOD PRESSURE: 126 MMHG | BODY MASS INDEX: 29.45 KG/M2 | HEIGHT: 70 IN | TEMPERATURE: 99 F | DIASTOLIC BLOOD PRESSURE: 76 MMHG | WEIGHT: 205.69 LBS

## 2018-08-14 DIAGNOSIS — F17.200 SMOKER: ICD-10-CM

## 2018-08-14 DIAGNOSIS — R10.9 ABDOMINAL PAIN, UNSPECIFIED ABDOMINAL LOCATION: Primary | ICD-10-CM

## 2018-08-14 DIAGNOSIS — Z12.11 SCREEN FOR COLON CANCER: ICD-10-CM

## 2018-08-14 DIAGNOSIS — M62.08 DIASTASIS RECTI: ICD-10-CM

## 2018-08-14 DIAGNOSIS — Z90.49 S/P COLECTOMY: ICD-10-CM

## 2018-08-14 DIAGNOSIS — B18.2 CHRONIC HEPATITIS C WITHOUT HEPATIC COMA: ICD-10-CM

## 2018-08-14 DIAGNOSIS — Z98.890 S/P EXPLORATORY LAPAROTOMY: ICD-10-CM

## 2018-08-14 DIAGNOSIS — R10.33 PERIUMBILICAL ABDOMINAL PAIN: ICD-10-CM

## 2018-08-14 DIAGNOSIS — K42.9 UMBILICAL HERNIA WITHOUT OBSTRUCTION AND WITHOUT GANGRENE: ICD-10-CM

## 2018-08-14 DIAGNOSIS — K43.2 INCISIONAL HERNIA, WITHOUT OBSTRUCTION OR GANGRENE: ICD-10-CM

## 2018-08-14 PROCEDURE — 3078F DIAST BP <80 MM HG: CPT | Mod: CPTII,S$GLB,, | Performed by: SURGERY

## 2018-08-14 PROCEDURE — 99215 OFFICE O/P EST HI 40 MIN: CPT | Mod: S$GLB,,, | Performed by: SURGERY

## 2018-08-14 PROCEDURE — 3074F SYST BP LT 130 MM HG: CPT | Mod: CPTII,S$GLB,, | Performed by: SURGERY

## 2018-08-14 PROCEDURE — 99999 PR PBB SHADOW E&M-EST. PATIENT-LVL III: CPT | Mod: PBBFAC,,, | Performed by: SURGERY

## 2018-08-14 NOTE — PROGRESS NOTES
Subjective:      Patient ID: Jose Roca is a 78 y.o. male.    Chief Complaint: Follow-up (ex lap 2016)   Patient presents for evaluation of abdominal hernias.  He is known to me from follow-up during Dr. jeffery maternity leave.  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  He is an uncontrolled diabetic and smoker.  He has chronic hepatitis C and has been followed by hepatology Clinic.  His last titers were undetectable last year.  Reports that he developed a hernia after his ex lap and that is gotten worse.  He was supposed to have an ultrasound of this earlier this year but he declined.  He states he does not want to have an ultrasound but will have a CT scan.  No nausea or vomiting.  No fevers or chills.  Occasional constipation, no diarrhea.  No obvious skin changes.  Patient requests surgery to repair the hernia soon as possible.  No other complaints.    Past Medical History:   Diagnosis Date    Diabetes mellitus, type 2     High cholesterol     History of hepatitis C; S/p RX with SVR 23 documented 07/2017 7/18/2017    Hypertension      Past Surgical History:   Procedure Laterality Date    CHOLECYSTECTOMY      CORONARY STENT PLACEMENT      EXPLORATORY LAPAROTOMY W/ BOWEL RESECTION  09/19/2016    ileocecectomy     History reviewed. No pertinent family history.  Social History     Socioeconomic History    Marital status: Single     Spouse name: None    Number of children: None    Years of education: None    Highest education level: None   Social Needs    Financial resource strain: None    Food insecurity - worry: None    Food insecurity - inability: None    Transportation needs - medical: None    Transportation needs - non-medical: None   Occupational History    None   Tobacco Use    Smoking status: Current Every Day Smoker     Packs/day: 0.50     Types: Cigarettes    Smokeless tobacco: Never Used    Tobacco comment: APPROX 3 CIGARETTES A DAY   Substance and Sexual Activity  "   Alcohol use: No    Drug use: Yes     Types: Marijuana     Comment: medical    Sexual activity: None   Other Topics Concern    None   Social History Narrative    None       Current Outpatient Medications   Medication Sig Dispense Refill    aspirin (ECOTRIN) 325 MG EC tablet Take 325 mg by mouth once daily.      BD INSULIN PEN NEEDLE UF SHORT 31 gauge x 5/16" Ndle       fluticasone (FLONASE) 50 mcg/actuation nasal spray 1 spray (50 mcg total) by Each Nare route once daily. 15 g 0    gabapentin (NEURONTIN) 300 MG capsule One po hs x 5 days then one twice daily for back pain/sciatica 90 capsule 0    glimepiride (AMARYL) 4 MG tablet Take 4 mg by mouth once daily.      ibuprofen (ADVIL,MOTRIN) 800 MG tablet Take 1 tablet (800 mg total) by mouth every 6 (six) hours as needed for Pain. 50 tablet 0    insulin glargine (LANTUS) 100 unit/mL injection Inject 15 Units into the skin every evening. 5 vial 5    insulin syringe-needle U-100 0.3 mL 30 gauge x 5/16 Syrg       meclizine (ANTIVERT) 25 mg tablet Take 1 tablet (25 mg total) by mouth 3 (three) times daily as needed for Dizziness. 20 tablet 0    sitagliptin (JANUVIA) 100 MG Tab Take 1 tablet (100 mg total) by mouth once daily. 30 tablet 5    valsartan-hydrochlorothiazide (DIOVAN-HCT) 160-12.5 mg per tablet Take 1 tablet by mouth once daily.      amlodipine (NORVASC) 10 MG tablet Take 1 tablet (10 mg total) by mouth once daily. 30 tablet 5    simvastatin (ZOCOR) 40 MG tablet Take 1 tablet (40 mg total) by mouth every evening. 30 tablet 5     No current facility-administered medications for this visit.      Review of patient's allergies indicates:  No Known Allergies    ROS:  All systems were reviewed and are negative, except that mentioned in the HPI.    Objective:     Vitals:    08/14/18 1415   BP: 126/76   Pulse: 72   Temp: 98.8 °F (37.1 °C)   SpO2: 98%   Weight: 93.3 kg (205 lb 11 oz)   Height: 5' 10" (1.778 m)     Physical Exam   Constitutional: He " is oriented to person, place, and time. He appears well-developed and well-nourished. No distress.   HENT:   Head: Normocephalic and atraumatic.   Eyes: Conjunctivae and EOM are normal. Pupils are equal, round, and reactive to light. No scleral icterus.   Neck: Normal range of motion. Neck supple. No JVD present.   Cardiovascular: Normal rate and regular rhythm.   Pulmonary/Chest: Effort normal and breath sounds normal. No respiratory distress.   Abdominal: Soft. Bowel sounds are normal. He exhibits no distension. There is tenderness (Over periumbilical and supraumbilical region of hernia). There is no rebound and no guarding. A hernia ( periumbilical and supraumbilical region, no overlying skin changes, mildly tender to palpation, partially reducible) is present.   Musculoskeletal: Normal range of motion. He exhibits no edema or tenderness.   Neurological: He is alert and oriented to person, place, and time. No cranial nerve deficit.   Skin: Skin is warm and dry. He is not diaphoretic.   Psychiatric: He has a normal mood and affect.       Lab Review: CBC:   Lab Results   Component Value Date    WBC 10.01 03/21/2018    RBC 4.63 03/21/2018    HGB 13.7 (L) 03/21/2018    HCT 41.5 03/21/2018     03/21/2018     BMP:   Lab Results   Component Value Date     03/21/2018     03/21/2018    K 4.9 03/21/2018     03/21/2018    CO2 26 03/21/2018    BUN 26 (H) 03/21/2018    BUN 12 03/05/2015    CREATININE 1.9 (H) 03/21/2018    CALCIUM 9.3 03/21/2018     Lab Results   Component Value Date    ALT 20 03/21/2018    AST 22 03/21/2018    ALKPHOS 71 03/21/2018    BILITOT 0.7 03/21/2018     Lab Results   Component Value Date    HGBA1C 9.2 (H) 09/20/2016       Diagnostics Review:  U/S 9/14/2017 images and reports were personally reviewed.  The report states:  Liver: Small measuring 12 cm cm. Homogeneous echotexture. No focal hepatic lesions.  Gallbladder: Removed  Biliary system: The common duct is not dilated,  measuring 2 mm.  No intrahepatic ductal dilatation.  Spleen measures 7 cm in length and there is a trace of ascites present    Echo 09/2016:  CONCLUSIONS     1 - Normal left ventricular systolic function (EF 60-65%).     2 - Normal left ventricular diastolic function.     3 - Normal right ventricular systolic function .     4 - The estimated PA systolic pressure is 23 mmHg.   Assessment:     1. Abdominal pain, unspecified abdominal location    2. S/P exploratory laparotomy    3. Umbilical hernia without obstruction and without gangrene    4. Diastasis recti    5. S/P colectomy    6. Chronic hepatitis C without hepatic coma    7. Incisional hernia, without obstruction or gangrene    8. 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 Periumbilical abdominal pain    10. Smoker    11. Screen for colon cancer      Plan:     Patient's records were reviewed extensively.  Will contact hepatology for recommendations regarding surgery/general anesthesia. He was last seen by hepatology in March 2018 is due for follow-up in September.  His HCV titer in July 2017 was less than 1.08.  We will also obtain a CT to further evaluate the abdominal wall and contents of the hernia. Records release was signed and we will request recent blood work from his PCP, especially a more recent hemoglobin A1c as it was quite elevated when last documented in our system (9.2 in 2006).  All of his questions were answered.  Patient indicated that he would like to proceed with surgery soon as possible as it is very uncomfortable.  Of note he has never had a colonoscopy and refuses colonoscopy at this time.  He takes aspirin daily and we will ask his primary care provider to provide a preoperative evaluation.

## 2018-08-16 ENCOUNTER — HOSPITAL ENCOUNTER (OUTPATIENT)
Dept: RADIOLOGY | Facility: HOSPITAL | Age: 78
Discharge: HOME | End: 2018-08-16
Attending: SURGERY
Payer: MEDICARE

## 2018-08-16 DIAGNOSIS — K42.9 UMBILICAL HERNIA WITHOUT OBSTRUCTION AND WITHOUT GANGRENE: ICD-10-CM

## 2018-08-16 DIAGNOSIS — R10.9 ABDOMINAL PAIN, UNSPECIFIED ABDOMINAL LOCATION: ICD-10-CM

## 2018-08-16 DIAGNOSIS — Z98.890 S/P EXPLORATORY LAPAROTOMY: ICD-10-CM

## 2018-08-16 PROCEDURE — 74176 CT ABD & PELVIS W/O CONTRAST: CPT | Mod: TC,PO

## 2018-08-16 PROCEDURE — 25500020 PHARM REV CODE 255: Mod: PO | Performed by: SURGERY

## 2018-08-16 RX ADMIN — IOHEXOL 30 ML: 300 INJECTION, SOLUTION INTRAVENOUS at 07:08

## 2018-08-17 ENCOUNTER — TELEPHONE (OUTPATIENT)
Dept: HEPATOLOGY | Facility: CLINIC | Age: 78
End: 2018-08-17

## 2018-08-17 NOTE — TELEPHONE ENCOUNTER
MA called patient his friend answer his phone and said that he will have patient to call us back and schedule his appt.

## 2018-08-17 NOTE — TELEPHONE ENCOUNTER
----- Message from Dom Campbell PA-C sent at 8/17/2018  8:11 AM CDT -----  Please schedule in hepatology clinic to discuss surgical risk  Thanks   ----- Message -----  From: Ariella Lerner DO  Sent: 8/16/2018   6:49 PM  To: Dom Campbell PA-C    [facility],  This patient has 2 abdominal hernias, 1 containing colon and small bowel which he is causing great discomfort.  He would like surgery as soon as possible.  Would you like to see him preoperatively?  Do think he is high risk from the liver standpoint?  Thank you,  Annalee Lerner

## 2018-08-24 ENCOUNTER — OFFICE VISIT (OUTPATIENT)
Dept: SURGERY | Facility: CLINIC | Age: 78
End: 2018-08-24
Payer: MEDICARE

## 2018-08-24 VITALS
DIASTOLIC BLOOD PRESSURE: 60 MMHG | BODY MASS INDEX: 28.84 KG/M2 | WEIGHT: 201 LBS | OXYGEN SATURATION: 97 % | SYSTOLIC BLOOD PRESSURE: 108 MMHG | TEMPERATURE: 98 F | HEART RATE: 68 BPM

## 2018-08-24 DIAGNOSIS — F17.200 SMOKER: ICD-10-CM

## 2018-08-24 DIAGNOSIS — K43.2 INCISIONAL HERNIA, WITHOUT OBSTRUCTION OR GANGRENE: Primary | ICD-10-CM

## 2018-08-24 DIAGNOSIS — E11.42 DM TYPE 2 WITH DIABETIC PERIPHERAL NEUROPATHY: ICD-10-CM

## 2018-08-24 DIAGNOSIS — Z86.19 HISTORY OF HEPATITIS C: ICD-10-CM

## 2018-08-24 DIAGNOSIS — I48.91 LONE ATRIAL FIBRILLATION: ICD-10-CM

## 2018-08-24 DIAGNOSIS — Z01.810 PREOP CARDIOVASCULAR EXAM: ICD-10-CM

## 2018-08-24 DIAGNOSIS — Z98.890 S/P EXPLORATORY LAPAROTOMY: ICD-10-CM

## 2018-08-24 DIAGNOSIS — Z79.82 ASPIRIN LONG-TERM USE: ICD-10-CM

## 2018-08-24 PROCEDURE — 99215 OFFICE O/P EST HI 40 MIN: CPT | Mod: S$GLB,,, | Performed by: SURGERY

## 2018-08-24 PROCEDURE — 99999 PR PBB SHADOW E&M-EST. PATIENT-LVL V: CPT | Mod: PBBFAC,,, | Performed by: SURGERY

## 2018-08-24 PROCEDURE — 3078F DIAST BP <80 MM HG: CPT | Mod: CPTII,S$GLB,, | Performed by: SURGERY

## 2018-08-24 PROCEDURE — 3074F SYST BP LT 130 MM HG: CPT | Mod: CPTII,S$GLB,, | Performed by: SURGERY

## 2018-08-24 RX ORDER — LIDOCAINE HYDROCHLORIDE 10 MG/ML
1 INJECTION, SOLUTION EPIDURAL; INFILTRATION; INTRACAUDAL; PERINEURAL ONCE
Status: CANCELLED | OUTPATIENT
Start: 2018-08-24 | End: 2018-08-24

## 2018-08-24 NOTE — PROGRESS NOTES
Subjective:       Patient ID: Jose Roca is a 78 y.o. male.    Chief Complaint: Hernia (test results)      Previous HPI:  Patient presents for evaluation of abdominal hernias.  He is known to me from follow-up during Dr. Palumbo's maternity leave.  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  He is an uncontrolled diabetic and smoker.  He has HCV and has been followed by hepatology Clinic.  His last titers were undetectable last year.  Reports that he developed a hernia after his ex lap and that is gotten worse.  He was supposed to have an ultrasound of this earlier this year but he declined.  He states he does not want to have an ultrasound but will have a CT scan.  No nausea or vomiting.  No fevers or chills.  Occasional constipation, no diarrhea.  No obvious skin changes.  Patient requests surgery to repair the hernia soon as possible.  No other complaints    Current HPI:   Smokes 2 cigarettes daily, no abdominal pain presently (and for past 2d). No constipation. No n/v. No f/c. No new c/o.    Review of Systems    All systems were reviewed and are negative, except that mentioned in the HPI.    Objective:      Physical Exam   Constitutional: He is oriented to person, place, and time. He appears well-developed and well-nourished. No distress.   HENT:   Head: Normocephalic and atraumatic.   Neck: Normal range of motion. Neck supple. No JVD present.   Cardiovascular: Normal rate and regular rhythm.   Pulmonary/Chest: Effort normal and breath sounds normal. No respiratory distress.   Abdominal: Soft. Bowel sounds are normal. He exhibits no distension. A hernia (incisional hernia x 2, nttp, reducible, no skin changes) is present.   Musculoskeletal: Normal range of motion. He exhibits no edema or tenderness.   Neurological: He is alert and oriented to person, place, and time. No cranial nerve deficit.   Skin: Skin is warm and dry. He is not diaphoretic.   Psychiatric: He has a normal mood and  affect.         Lab Results   Component Value Date    WBC 10.01 03/21/2018    HGB 13.7 (L) 03/21/2018    HCT 41.5 03/21/2018    MCV 90 03/21/2018     03/21/2018     BMP  Lab Results   Component Value Date     03/21/2018    K 4.9 03/21/2018     03/21/2018    CO2 26 03/21/2018    BUN 26 (H) 03/21/2018    CREATININE 1.9 (H) 03/21/2018    CALCIUM 9.3 03/21/2018    ANIONGAP 6 (L) 03/21/2018    ESTGFRAFRICA 39 (A) 03/21/2018    EGFRNONAA 33 (A) 03/21/2018     Lab Results   Component Value Date    ALT 20 03/21/2018    AST 22 03/21/2018    ALKPHOS 71 03/21/2018    BILITOT 0.7 03/21/2018     Lab Results   Component Value Date    HGBA1C 9.2 (H) 09/20/2016 June 2018 HgA1c 6.2 (scanned into media file)    CT A/P 8/16/2018 images and report personally reviewed.  Since the prior study of 09/19/2016, there has been interval development of a supraumbilical hernia containing colon and small bowel protruding through a 5.9 cm wide neck, without evidence of complication. There is also a left periumbilical fat-containing hernia protruding through a 4.6 cm neck, also uncomplicated.    Echo 9/2016:  CONCLUSIONS     1 - Normal left ventricular systolic function (EF 60-65%).     2 - Normal left ventricular diastolic function.     3 - Normal right ventricular systolic function .     4 - The estimated PA systolic pressure is 23 mmHg.     Assessment:       1. Incisional hernia, without obstruction or gangrene    2. S/P exploratory laparotomy    3. Preop cardiovascular exam    4. Smoker    5. DM type 2 with diabetic peripheral neuropathy    6. Lone atrial fibrillation    7. History of hepatitis C; S/p RX with SVR 23 documented 07/2017    8. Aspirin long-term use        Plan:       The pathology of the patient's disease was discussed: large incisional hernia x2 containing bowel. Written handouts were explained and given to the patient.  Elective hybrid (open and lap) hernia repair with mesh placement was recommended. The  surgical procedure, risks, postop recovery and consent were discussed. All questions were answered and the consent was signed. Signs and symptoms of worsening disease was discussed.  The patient will call or go to ER should those symptoms develop.  Not taking ASA. Echo noted. Tolerated colon surgery well in 2016. Will contact PCP for preop eval. Surgery planned for 9/5/18 by patient request. Patient was informed that he would need a ride to/from the hospital.  He will work on this and we will contact social work for other options.

## 2018-09-05 PROBLEM — K43.2 INCISIONAL HERNIA, WITHOUT OBSTRUCTION OR GANGRENE: Status: ACTIVE | Noted: 2018-09-05

## 2018-09-06 PROBLEM — F17.200 SMOKER: Status: ACTIVE | Noted: 2018-09-06

## 2018-09-06 PROBLEM — R33.8 POSTOPERATIVE URINARY RETENTION: Status: ACTIVE | Noted: 2018-09-06

## 2018-09-06 PROBLEM — N99.89 POSTOPERATIVE URINARY RETENTION: Status: ACTIVE | Noted: 2018-09-06

## 2018-09-06 PROBLEM — E66.9 OBESITY, CLASS I, BMI 30-34.9: Status: ACTIVE | Noted: 2018-09-06

## 2018-09-09 DIAGNOSIS — R33.9 URINARY RETENTION: Primary | ICD-10-CM

## 2018-09-10 ENCOUNTER — HOSPITAL ENCOUNTER (EMERGENCY)
Facility: HOSPITAL | Age: 78
Discharge: HOME | End: 2018-09-10
Attending: EMERGENCY MEDICINE
Payer: MEDICARE

## 2018-09-10 ENCOUNTER — TELEPHONE (OUTPATIENT)
Dept: UROLOGY | Facility: CLINIC | Age: 78
End: 2018-09-10

## 2018-09-10 VITALS
TEMPERATURE: 98 F | WEIGHT: 200 LBS | RESPIRATION RATE: 16 BRPM | OXYGEN SATURATION: 98 % | HEIGHT: 69 IN | HEART RATE: 85 BPM | DIASTOLIC BLOOD PRESSURE: 94 MMHG | BODY MASS INDEX: 29.62 KG/M2 | SYSTOLIC BLOOD PRESSURE: 182 MMHG

## 2018-09-10 DIAGNOSIS — T83.9XXA PROBLEM WITH FOLEY CATHETER, INITIAL ENCOUNTER: Primary | ICD-10-CM

## 2018-09-10 PROCEDURE — 51702 INSERT TEMP BLADDER CATH: CPT

## 2018-09-10 PROCEDURE — 99284 EMERGENCY DEPT VISIT MOD MDM: CPT | Mod: 25

## 2018-09-10 NOTE — ED PROVIDER NOTES
"Encounter Date: 9/10/2018    SCRIBE #1 NOTE: I, Lee Green, am scribing for, and in the presence of,  Dr. Guy Lefort. I have scribed the entire note.       History     Chief Complaint   Patient presents with    Male  Problem     hernia repair by Dr. Lerner at [place] on 9/5, d/c with hutton cath, patient states it got pulled in his sleep last night and is now leaking, no blood     Jose Roca is a 78 y.o. male who  has a past medical history of Diabetes mellitus, type 2, High cholesterol, History of hepatitis C; S/p RX with SVR 23 documented 07/2017 (7/18/2017), and Hypertension.    The patient presents to the ED due to hutton catheter leak that occurred sometime last night. Patient had hernia repair performed by Dr. Lerner at [place] on 9/5/2018 and was discharged with hutton catheter. Pt reports catheter got pulled in his sleep last night and is now leaking. Reports no blood.  States woke up in bed "swimming pool of urine"  He denies any current pain or fever, abd pain.  Currently reports no symptoms or leaking of urine.        The history is provided by the patient.     Review of patient's allergies indicates:  No Known Allergies  Past Medical History:   Diagnosis Date    Diabetes mellitus, type 2     High cholesterol     History of hepatitis C; S/p RX with SVR 23 documented 07/2017 7/18/2017    Hypertension      Past Surgical History:   Procedure Laterality Date    CHOLECYSTECTOMY      CORONARY STENT PLACEMENT      EXPLORATORY LAPAROTOMY W/ BOWEL RESECTION  09/19/2016    ileocecectomy    EXPLORATORY-LAPAROTOMY N/A 9/19/2016    Performed by [facility]elia Palumbo [facility] at [facility] OR    ILEOCECECTOMY  9/19/2016    Performed by [facility]elia Palumbo MD at [facility] OR    LYSIS OF ADHESIONS N/A 9/5/2018    Procedure: LYSIS, ADHESIONS;  Surgeon: Ariella Lerner DO;  Location: [facility] OR;  Service: General;  Laterality: N/A;    LYSIS, ADHESIONS N/A 9/5/2018    Performed by Ariella Lerner DO at [facility] OR    " OMENTECTOMY N/A 9/5/2018    Procedure: OMENTECTOMY;  Surgeon: Ariella Lerner DO;  Location: [facility] OR;  Service: General;  Laterality: N/A;    OMENTECTOMY N/A 9/5/2018    Performed by Ariella Lerner DO at [facility] OR    REPAIR,HERNIA,INCISIONAL OR VENTRAL,WITHOUT HISTORY OF PRIOR REPAIR(MIDLINE HERNIA DEFECT 6CMx3.6CM) (LEFT LATERAL HERNIA DEFECT 4.5APr6ZN) (HYBRID) N/A 9/5/2018    Performed by Ariella Lerner DO at [facility] OR     History reviewed. No pertinent family history.  Social History     Tobacco Use    Smoking status: Current Every Day Smoker     Packs/day: 0.50     Types: Cigarettes    Smokeless tobacco: Never Used    Tobacco comment: APPROX 3 CIGARETTES A DAY   Substance Use Topics    Alcohol use: No    Drug use: Yes     Types: Marijuana     Comment: medical     Review of Systems   Constitutional: Negative for fever.   Gastrointestinal: Negative for nausea and vomiting.   Genitourinary: Negative for flank pain, penile pain, penile swelling and testicular pain.   Musculoskeletal: Negative for back pain.       Physical Exam     Initial Vitals [09/10/18 0735]   BP Pulse Resp Temp SpO2   (!) 148/88 90 16 99.4 °F (37.4 °C) 98 %      MAP       --         Physical Exam    Nursing note and vitals reviewed.  Constitutional: He appears well-developed and well-nourished. No distress.   HENT:   Head: Normocephalic and atraumatic.   Eyes: Conjunctivae and EOM are normal. Pupils are equal, round, and reactive to light.   Neck: Normal range of motion. Neck supple.   Cardiovascular: Normal rate.   Pulmonary/Chest: No respiratory distress.   Abdominal: He exhibits no distension.   Multiple abdominal Laproscopic Surgical site scars CDI, dressings intact   Genitourinary:   Genitourinary Comments: Umana in place, no penile trauma, no bloody discharge, no urine discharge. Leg bag attached with missing drainage cap.   Musculoskeletal: Normal range of motion.   Neurological: He is alert and oriented to person,  place, and time.   Skin: Skin is warm and dry.   Psychiatric: Thought content normal.         ED Course   Procedures  Labs Reviewed - No data to display       Imaging Results    None          Medical Decision Making:   Initial Assessment:   Comfortable, no complaints  Differential Diagnosis:   Urethral trauma, infection, retention, blood clot, equipment problems  ED Management:  Examination reveals Umana leg bag that has had the drainage calf removed.  This likely explains the patient's historical elements.  Upon questioning he states that he forgot to replace the cap.  Removed place the caput discharge the patient with continued scheduled follow-up                      Clinical Impression:     1. Problem with Umana catheter, initial encounter            Disposition:   Disposition: Discharged  Condition: Stable       I, Dr. Guy Lefort, personally performed the services described in this documentation. All medical record entries made by the scribe were at my direction and in my presence. I have reviewed the chart and agree that the record reflects my personal performance and is accurate and complete. Guy Lefort, MD.  8:08 AM 09/10/2018                   Guy J. Lefort, MD  09/10/18 0809

## 2018-09-10 NOTE — ED NOTES
APPEARANCE: Alert, oriented and in no acute distress.  CARDIAC: Normal rate and rhythm  PERIPHERAL VASCULAR: peripheral pulses present. Normal cap refill. No edema. Warm to touch.    RESPIRATORY: Respirations are equal and unlabored no obvious signs of distress.  GASTRO: bowel sounds normal, no tenderness. Clean dry intact dressings in place from recent hernia sx/no drainage noted    MUSC: No obvious deformiyt/ambualtes with cane   SKIN: Skin is warm,dry and intact   NEURO:  No neurological abnormalities.   MENTAL STATUS: awake, alert and aware of environment.  EYE: PERRL x2  GENITALIA: Normal external genitalia

## 2018-09-10 NOTE — TELEPHONE ENCOUNTER
----- Message from Ariella Lerner DO sent at 9/9/2018  3:54 PM CDT -----  Hello,  This patient underwent complex incisional hernia repairs with me at [facility] on 9/5.  He stayed several days, as expected for postop recovery.  He had significant postop urinary retention.  The hutton was removed POD 1 then replaced later that night with 1L residual, Flomax started then and Hutton kept in place then removed again POD 3 but pt still unable to void and Hutton replaced later that day and pt dc'd home with Hutton and new rx of Flomax.  Can you please help coordinate a f/u with Urology?   Thank you!  MARV

## 2018-09-10 NOTE — ED NOTES
Pt presents to the ED c/o catheter leaking after being pulled in his sleep.  Placed due to recent hernia repair sx/dc'd Saturday from  [facility] /Dr Lerner surgeon

## 2018-09-11 ENCOUNTER — PATIENT OUTREACH (OUTPATIENT)
Dept: ADMINISTRATIVE | Facility: CLINIC | Age: 78
End: 2018-09-11

## 2018-09-11 NOTE — PATIENT INSTRUCTIONS
After Laparoscopic Hernia Repair  You had a procedure called laparoscopic hernia repair. A hernia is a defect in the tough tissue covering the musculature of the abdominal wall (fascia). During laparoscopic hernia surgery, a surgeon inserts a telescope attached to a camera as well as surgical instruments through tiny incisions in your abdomen. The surgeon repairs the hernia with a mesh, which patches the tear or weakness in the fascia.  Home care  · Note that your shoulder may feel tight or your neck may be stiff for 24 to 48 hours after your surgery. This is common and usually lasts a short time. You may also have numbness around the incision area.  · Keep doing the coughing and deep breathing exercises that you learned in the hospital. These will help to prevent lung infection.  · Prevent constipation so you dont strain when going to the bathroom. Eat fruits, vegetables, and whole grains. Drink 6 to 8 glasses of water a day, unless otherwise directed. Use a laxative or a mild stool softener if your healthcare provider says its OK.  · Wash your incision with mild soap and water. Pat it dry. Dont use oil, powder, or lotion on your incision.  · Shower or take baths as instructed by your healthcare provider. Instructions will vary based on how your incision was closed and how its healing. It may be closed with glue, sutures, or staples. Your healthcare provider may have different advice for each kind.  Activity  · Ask others to help with chores and errands while you recover.  · Dont lift anything heavier than 10 pounds until your healthcare provider says its OK.  · Dont mow the lawn, use a vacuum , or do other strenuous activities until your healthcare provider says it's OK.  · Climb stairs slowly and pause after every few steps.  · Walk as often as you feel able.  · Ask your healthcare provider when you can drive again. This may be when you stop taking pain medicine and can move comfortably from side to  side. Dont drive if you are still taking opioid pain medicine.  When to call your healthcare provider   Call your healthcare provider right away if you have any of the following:  · Pain, bleeding, redness, or fluid at the incision site that gets worse  · Fever of 100.4°F (38°C) or higher, or as directed by your healthcare provider  · Vomiting or nausea that doesnt go away  · Inability to urinate  · No bowel movement after 3 days  · Swelling in abdomen or groin that gets worse  · Pain thats not relieved by medicine   Date Last Reviewed: 10/1/2016  © 5438-4765 [employer] [address] [place] [address], [place] [address]. All rights reserved. This information is not intended as a substitute for professional medical care. Always follow your healthcare professional's instructions.

## 2018-09-12 ENCOUNTER — TELEPHONE (OUTPATIENT)
Dept: SURGERY | Facility: CLINIC | Age: 78
End: 2018-09-12

## 2018-09-12 NOTE — TELEPHONE ENCOUNTER
----- Message from Ruthie Locke sent at 9/12/2018 10:21 AM CDT -----  Contact: [phone]  Patient called in returning your call. Please advise    9/12/18, 1039am - I spoke with patient regarding this message.  He didn't realize he already had an appointment with Urology, Monday 9/17/18 to remove the hutton cath.  I reminded him he also has an US and LABS scheduled at [facility] tomorrow, as well.  Pt verbalized understanding.

## 2018-09-13 ENCOUNTER — HOSPITAL ENCOUNTER (OUTPATIENT)
Dept: RADIOLOGY | Facility: HOSPITAL | Age: 78
Discharge: HOME | End: 2018-09-13
Attending: PHYSICIAN ASSISTANT
Payer: MEDICARE

## 2018-09-13 DIAGNOSIS — Z86.19 HISTORY OF HEPATITIS C: ICD-10-CM

## 2018-09-13 DIAGNOSIS — K74.60 CIRRHOSIS OF LIVER WITHOUT ASCITES, UNSPECIFIED HEPATIC CIRRHOSIS TYPE: ICD-10-CM

## 2018-09-13 PROCEDURE — 76705 ECHO EXAM OF ABDOMEN: CPT | Mod: TC,PO

## 2018-09-17 ENCOUNTER — HOSPITAL ENCOUNTER (INPATIENT)
Facility: HOSPITAL | Age: 78
LOS: 7 days | Discharge: HOME HEALTH | DRG: 699 | End: 2018-09-24
Attending: EMERGENCY MEDICINE | Admitting: SURGERY
Payer: MEDICARE

## 2018-09-17 DIAGNOSIS — E11.42 DM TYPE 2 WITH DIABETIC PERIPHERAL NEUROPATHY: Chronic | ICD-10-CM

## 2018-09-17 DIAGNOSIS — N40.1 BENIGN PROSTATIC HYPERPLASIA WITH URINARY RETENTION: ICD-10-CM

## 2018-09-17 DIAGNOSIS — A41.9 SEPSIS SECONDARY TO UTI: ICD-10-CM

## 2018-09-17 DIAGNOSIS — N99.89 POSTOPERATIVE URINARY RETENTION: ICD-10-CM

## 2018-09-17 DIAGNOSIS — M54.9 BACK PAIN WITH SCIATICA: ICD-10-CM

## 2018-09-17 DIAGNOSIS — R33.8 POSTOPERATIVE URINARY RETENTION: ICD-10-CM

## 2018-09-17 DIAGNOSIS — K43.2 INCISIONAL HERNIA, WITHOUT OBSTRUCTION OR GANGRENE: ICD-10-CM

## 2018-09-17 DIAGNOSIS — Z86.19 HISTORY OF HEPATITIS C: ICD-10-CM

## 2018-09-17 DIAGNOSIS — A41.9 SEPSIS: ICD-10-CM

## 2018-09-17 DIAGNOSIS — N39.0 URINARY TRACT INFECTION ASSOCIATED WITH INDWELLING URETHRAL CATHETER, SUBSEQUENT ENCOUNTER: ICD-10-CM

## 2018-09-17 DIAGNOSIS — F17.200 SMOKER: ICD-10-CM

## 2018-09-17 DIAGNOSIS — N39.0 URINARY TRACT INFECTION ASSOCIATED WITH INDWELLING URETHRAL CATHETER: Primary | ICD-10-CM

## 2018-09-17 DIAGNOSIS — T83.511A URINARY TRACT INFECTION ASSOCIATED WITH INDWELLING URETHRAL CATHETER: Primary | ICD-10-CM

## 2018-09-17 DIAGNOSIS — T83.511D URINARY TRACT INFECTION ASSOCIATED WITH INDWELLING URETHRAL CATHETER, SUBSEQUENT ENCOUNTER: ICD-10-CM

## 2018-09-17 DIAGNOSIS — E66.9 OBESITY, CLASS I, BMI 30-34.9: ICD-10-CM

## 2018-09-17 DIAGNOSIS — I10 ESSENTIAL HYPERTENSION: Chronic | ICD-10-CM

## 2018-09-17 DIAGNOSIS — T83.511A URINARY TRACT INFECTION ASSOCIATED WITH INDWELLING URETHRAL CATHETER, INITIAL ENCOUNTER: ICD-10-CM

## 2018-09-17 DIAGNOSIS — N39.0 SEPSIS SECONDARY TO UTI: ICD-10-CM

## 2018-09-17 DIAGNOSIS — M54.30 BACK PAIN WITH SCIATICA: ICD-10-CM

## 2018-09-17 DIAGNOSIS — R33.8 BENIGN PROSTATIC HYPERPLASIA WITH URINARY RETENTION: ICD-10-CM

## 2018-09-17 DIAGNOSIS — N39.0 URINARY TRACT INFECTION ASSOCIATED WITH INDWELLING URETHRAL CATHETER, INITIAL ENCOUNTER: ICD-10-CM

## 2018-09-17 DIAGNOSIS — M95.8 DEFORMITY OF CLAVICLE: ICD-10-CM

## 2018-09-17 LAB
ALBUMIN SERPL BCP-MCNC: 2.7 G/DL
ALP SERPL-CCNC: 78 U/L
ALT SERPL W/O P-5'-P-CCNC: 13 U/L
ANION GAP SERPL CALC-SCNC: 14 MMOL/L
ANISOCYTOSIS BLD QL SMEAR: SLIGHT
AST SERPL-CCNC: 19 U/L
BACTERIA #/AREA URNS HPF: ABNORMAL /HPF
BASOPHILS # BLD AUTO: ABNORMAL K/UL
BASOPHILS NFR BLD: 0 %
BILIRUB SERPL-MCNC: 1.7 MG/DL
BILIRUB UR QL STRIP: ABNORMAL
BUN SERPL-MCNC: 29 MG/DL
CALCIUM SERPL-MCNC: 8.5 MG/DL
CHLORIDE SERPL-SCNC: 95 MMOL/L
CLARITY UR: ABNORMAL
CO2 SERPL-SCNC: 22 MMOL/L
COLOR UR: ABNORMAL
CREAT SERPL-MCNC: 1.8 MG/DL
DIFFERENTIAL METHOD: ABNORMAL
EOSINOPHIL # BLD AUTO: ABNORMAL K/UL
EOSINOPHIL NFR BLD: 0 %
ERYTHROCYTE [DISTWIDTH] IN BLOOD BY AUTOMATED COUNT: 13.8 %
EST. GFR  (AFRICAN AMERICAN): 41 ML/MIN/1.73 M^2
EST. GFR  (NON AFRICAN AMERICAN): 36 ML/MIN/1.73 M^2
FLUAV AG SPEC QL IA: NEGATIVE
FLUBV AG SPEC QL IA: NEGATIVE
GLUCOSE SERPL-MCNC: 193 MG/DL
GLUCOSE UR QL STRIP: NEGATIVE
GRAN CASTS #/AREA URNS LPF: 1 /LPF
HCT VFR BLD AUTO: 38.6 %
HGB BLD-MCNC: 12.5 G/DL
HGB UR QL STRIP: ABNORMAL
HYALINE CASTS #/AREA URNS LPF: 0 /LPF
KETONES UR QL STRIP: ABNORMAL
LACTATE SERPL-SCNC: 1.7 MMOL/L
LACTATE SERPL-SCNC: 1.8 MMOL/L
LEUKOCYTE ESTERASE UR QL STRIP: ABNORMAL
LYMPHOCYTES # BLD AUTO: ABNORMAL K/UL
LYMPHOCYTES NFR BLD: 4 %
MCH RBC QN AUTO: 28.3 PG
MCHC RBC AUTO-ENTMCNC: 32.4 G/DL
MCV RBC AUTO: 87 FL
MICROSCOPIC COMMENT: ABNORMAL
MONOCYTES # BLD AUTO: ABNORMAL K/UL
MONOCYTES NFR BLD: 4 %
NEUTROPHILS # BLD AUTO: ABNORMAL K/UL
NEUTROPHILS NFR BLD: 87 %
NEUTS BAND NFR BLD MANUAL: 5 %
NITRITE UR QL STRIP: POSITIVE
OB PNL STL: NEGATIVE
OVALOCYTES BLD QL SMEAR: ABNORMAL
PH UR STRIP: 6 [PH] (ref 5–8)
PLATELET # BLD AUTO: 350 K/UL
PLATELET BLD QL SMEAR: ABNORMAL
PMV BLD AUTO: 9.3 FL
POIKILOCYTOSIS BLD QL SMEAR: SLIGHT
POLYCHROMASIA BLD QL SMEAR: ABNORMAL
POTASSIUM SERPL-SCNC: 4.5 MMOL/L
PROT SERPL-MCNC: 6.4 G/DL
PROT UR QL STRIP: ABNORMAL
RBC # BLD AUTO: 4.42 M/UL
RBC #/AREA URNS HPF: 30 /HPF (ref 0–4)
SODIUM SERPL-SCNC: 131 MMOL/L
SP GR UR STRIP: >=1.03 (ref 1–1.03)
SPECIMEN SOURCE: NORMAL
SQUAMOUS #/AREA URNS HPF: 0 /HPF
URN SPEC COLLECT METH UR: ABNORMAL
UROBILINOGEN UR STRIP-ACNC: 1 EU/DL
WBC # BLD AUTO: 28.76 K/UL
WBC #/AREA STL HPF: NORMAL /[HPF]
WBC #/AREA URNS HPF: 40 /HPF (ref 0–5)

## 2018-09-17 PROCEDURE — 89055 LEUKOCYTE ASSESSMENT FECAL: CPT

## 2018-09-17 PROCEDURE — 87400 INFLUENZA A/B EACH AG IA: CPT

## 2018-09-17 PROCEDURE — 87046 STOOL CULTR AEROBIC BACT EA: CPT | Mod: 59

## 2018-09-17 PROCEDURE — 96365 THER/PROPH/DIAG IV INF INIT: CPT | Mod: 59

## 2018-09-17 PROCEDURE — 85007 BL SMEAR W/DIFF WBC COUNT: CPT

## 2018-09-17 PROCEDURE — 96361 HYDRATE IV INFUSION ADD-ON: CPT | Mod: 59

## 2018-09-17 PROCEDURE — 87045 FECES CULTURE AEROBIC BACT: CPT

## 2018-09-17 PROCEDURE — 87040 BLOOD CULTURE FOR BACTERIA: CPT | Mod: 59

## 2018-09-17 PROCEDURE — 11000001 HC ACUTE MED/SURG PRIVATE ROOM

## 2018-09-17 PROCEDURE — 87086 URINE CULTURE/COLONY COUNT: CPT

## 2018-09-17 PROCEDURE — 63600175 PHARM REV CODE 636 W HCPCS: Performed by: EMERGENCY MEDICINE

## 2018-09-17 PROCEDURE — 94761 N-INVAS EAR/PLS OXIMETRY MLT: CPT

## 2018-09-17 PROCEDURE — 99221 1ST HOSP IP/OBS SF/LOW 40: CPT | Mod: ,,, | Performed by: SURGERY

## 2018-09-17 PROCEDURE — 83605 ASSAY OF LACTIC ACID: CPT | Mod: 91

## 2018-09-17 PROCEDURE — 87209 SMEAR COMPLEX STAIN: CPT

## 2018-09-17 PROCEDURE — 87088 URINE BACTERIA CULTURE: CPT

## 2018-09-17 PROCEDURE — 85027 COMPLETE CBC AUTOMATED: CPT

## 2018-09-17 PROCEDURE — 81000 URINALYSIS NONAUTO W/SCOPE: CPT

## 2018-09-17 PROCEDURE — 80053 COMPREHEN METABOLIC PANEL: CPT

## 2018-09-17 PROCEDURE — 82272 OCCULT BLD FECES 1-3 TESTS: CPT

## 2018-09-17 PROCEDURE — 87427 SHIGA-LIKE TOXIN AG IA: CPT | Mod: 59

## 2018-09-17 PROCEDURE — 25000003 PHARM REV CODE 250: Performed by: EMERGENCY MEDICINE

## 2018-09-17 PROCEDURE — 87077 CULTURE AEROBIC IDENTIFY: CPT

## 2018-09-17 PROCEDURE — 87186 SC STD MICRODIL/AGAR DIL: CPT

## 2018-09-17 PROCEDURE — 63600175 PHARM REV CODE 636 W HCPCS: Performed by: SURGERY

## 2018-09-17 PROCEDURE — 93005 ELECTROCARDIOGRAM TRACING: CPT

## 2018-09-17 PROCEDURE — 99291 CRITICAL CARE FIRST HOUR: CPT | Mod: 25

## 2018-09-17 PROCEDURE — 51702 INSERT TEMP BLADDER CATH: CPT

## 2018-09-17 RX ORDER — ENOXAPARIN SODIUM 100 MG/ML
40 INJECTION SUBCUTANEOUS EVERY 24 HOURS
Status: DISCONTINUED | OUTPATIENT
Start: 2018-09-17 | End: 2018-09-24 | Stop reason: HOSPADM

## 2018-09-17 RX ORDER — ACETAMINOPHEN 325 MG/1
650 TABLET ORAL EVERY 4 HOURS PRN
Status: DISCONTINUED | OUTPATIENT
Start: 2018-09-17 | End: 2018-09-24 | Stop reason: HOSPADM

## 2018-09-17 RX ORDER — INSULIN ASPART 100 [IU]/ML
0-5 INJECTION, SOLUTION INTRAVENOUS; SUBCUTANEOUS
Status: DISCONTINUED | OUTPATIENT
Start: 2018-09-17 | End: 2018-09-24 | Stop reason: HOSPADM

## 2018-09-17 RX ORDER — GLUCAGON 1 MG
1 KIT INJECTION
Status: DISCONTINUED | OUTPATIENT
Start: 2018-09-17 | End: 2018-09-24 | Stop reason: HOSPADM

## 2018-09-17 RX ORDER — SODIUM CHLORIDE 0.9 % (FLUSH) 0.9 %
5 SYRINGE (ML) INJECTION
Status: DISCONTINUED | OUTPATIENT
Start: 2018-09-17 | End: 2018-09-24 | Stop reason: HOSPADM

## 2018-09-17 RX ORDER — IBUPROFEN 200 MG
16 TABLET ORAL
Status: DISCONTINUED | OUTPATIENT
Start: 2018-09-17 | End: 2018-09-24 | Stop reason: HOSPADM

## 2018-09-17 RX ORDER — TAMSULOSIN HYDROCHLORIDE 0.4 MG/1
0.4 CAPSULE ORAL DAILY
Status: DISCONTINUED | OUTPATIENT
Start: 2018-09-18 | End: 2018-09-24 | Stop reason: HOSPADM

## 2018-09-17 RX ORDER — IBUPROFEN 200 MG
24 TABLET ORAL
Status: DISCONTINUED | OUTPATIENT
Start: 2018-09-17 | End: 2018-09-24 | Stop reason: HOSPADM

## 2018-09-17 RX ORDER — ACETAMINOPHEN 325 MG/1
650 TABLET ORAL EVERY 6 HOURS PRN
Status: DISCONTINUED | OUTPATIENT
Start: 2018-09-17 | End: 2018-09-24 | Stop reason: HOSPADM

## 2018-09-17 RX ORDER — ONDANSETRON 2 MG/ML
4 INJECTION INTRAMUSCULAR; INTRAVENOUS EVERY 8 HOURS PRN
Status: DISCONTINUED | OUTPATIENT
Start: 2018-09-17 | End: 2018-09-24 | Stop reason: HOSPADM

## 2018-09-17 RX ORDER — SIMVASTATIN 20 MG/1
40 TABLET, FILM COATED ORAL NIGHTLY
Status: DISCONTINUED | OUTPATIENT
Start: 2018-09-17 | End: 2018-09-24 | Stop reason: HOSPADM

## 2018-09-17 RX ORDER — AMLODIPINE BESYLATE 5 MG/1
10 TABLET ORAL DAILY
Status: DISCONTINUED | OUTPATIENT
Start: 2018-09-18 | End: 2018-09-24 | Stop reason: HOSPADM

## 2018-09-17 RX ORDER — SODIUM CHLORIDE, SODIUM LACTATE, POTASSIUM CHLORIDE, CALCIUM CHLORIDE 600; 310; 30; 20 MG/100ML; MG/100ML; MG/100ML; MG/100ML
INJECTION, SOLUTION INTRAVENOUS CONTINUOUS
Status: DISCONTINUED | OUTPATIENT
Start: 2018-09-17 | End: 2018-09-20

## 2018-09-17 RX ADMIN — SODIUM CHLORIDE, SODIUM LACTATE, POTASSIUM CHLORIDE, AND CALCIUM CHLORIDE: .6; .31; .03; .02 INJECTION, SOLUTION INTRAVENOUS at 08:09

## 2018-09-17 RX ADMIN — INSULIN DETEMIR 15 UNITS: 100 INJECTION, SOLUTION SUBCUTANEOUS at 08:09

## 2018-09-17 RX ADMIN — SODIUM CHLORIDE 2721 ML: 0.9 INJECTION, SOLUTION INTRAVENOUS at 12:09

## 2018-09-17 RX ADMIN — ENOXAPARIN SODIUM 40 MG: 100 INJECTION SUBCUTANEOUS at 08:09

## 2018-09-17 RX ADMIN — SIMVASTATIN 40 MG: 20 TABLET, FILM COATED ORAL at 08:09

## 2018-09-17 RX ADMIN — CEFTRIAXONE SODIUM 2 G: 2 INJECTION, POWDER, FOR SOLUTION INTRAMUSCULAR; INTRAVENOUS at 01:09

## 2018-09-17 NOTE — ED PROVIDER NOTES
Encounter Date: 9/17/2018    SCRIBE #1 NOTE: I, Pastor Collins, am scribing for, and in the presence of,  Dr. Patel. I have scribed the entire note.       History     Chief Complaint   Patient presents with    Abdominal Pain     abd pain s/p hernia repair. pt also c/o weakness and has fever on arrival.      This is a 78 y.o. male who has a past medical history of Diabetes mellitus, type 2, High cholesterol, History of hepatitis C; S/p RX with SVR 23 documented 07/2017 (7/18/2017), and Hypertension.     Pt has a past surgical history that includes Coronary stent placement; Exploratory laparotomy w/ bowel resection (09/19/2016); Cholecystectomy; REPAIR,HERNIA,INCISIONAL OR VENTRAL,WITHOUT HISTORY OF PRIOR REPAIR(MIDLINE HERNIA DEFECT 6CMx3.6CM) (LEFT LATERAL HERNIA DEFECT 4.7GNz8BK) (HYBRID) (N/A, 9/5/2018); OMENTECTOMY (N/A, 9/5/2018); LYSIS, ADHESIONS (N/A, 9/5/2018); EXPLORATORY-LAPAROTOMY (N/A, 9/19/2016); and ILEOCECECTOMY (9/19/2016).      Patient presents to the ER with abdominal pain  Patient had hurnia surgery 2 weks ago  Hutton in place for 2 weeks since surgery  Has not been eating for 3 days  Has been keeping track of his sugar levels  He denies cough, vomiting, stomach pain.   Patient hinted at dysuria due to hutton placement  Though he has not eaten in 3 days, he does drink water.              Review of patient's allergies indicates:  No Known Allergies  Past Medical History:   Diagnosis Date    Diabetes mellitus, type 2     High cholesterol     History of hepatitis C; S/p RX with SVR 23 documented 07/2017 7/18/2017    Hypertension      Past Surgical History:   Procedure Laterality Date    CHOLECYSTECTOMY      CORONARY STENT PLACEMENT      EXPLORATORY LAPAROTOMY W/ BOWEL RESECTION  09/19/2016    ileocecectomy    EXPLORATORY-LAPAROTOMY N/A 9/19/2016    [facility]    ILEOCECECTOMY  9/19/2016    [facility] OR    LYSIS OF ADHESIONS N/A  9/5/2018    Procedure: LYSIS, ADHESIONS;  Surgeon: Ariella Lerner DO;  Location: [facility] OR;  Service: General;  Laterality: N/A;    LYSIS, ADHESIONS N/A 9/5/2018    Performed by [facility] at [facility] OR    OMENTECTOMY N/A 9/5/2018    Procedure: OMENTECTOMY;  Surgeon: Ariella Lerner DO;  Location: [facility] OR;  Service: General;  Laterality: N/A;    OMENTECTOMY N/A 9/5/2018    Performed by [facility] at [facility] OR    REPAIR,HERNIA,INCISIONAL OR VENTRAL,WITHOUT HISTORY OF PRIOR REPAIR(MIDLINE HERNIA DEFECT 6CMx3.6CM) (LEFT LATERAL HERNIA DEFECT [id number]) (HYBRID) N/A 9/5/2018    Performed by [facility] DO at [facility] OR     No family history on file.  Social History     Tobacco Use    Smoking status: Current Every Day Smoker     Packs/day: 0.50     Types: Cigarettes    Smokeless tobacco: Never Used    Tobacco comment: APPROX 3 CIGARETTES A DAY   Substance Use Topics    Alcohol use: No    Drug use: Yes     Types: Marijuana     Comment: medical     Review of Systems   Constitutional: Negative for fever.   Respiratory: Negative for cough, shortness of breath and wheezing.    Cardiovascular: Negative for chest pain and palpitations.   Gastrointestinal: Negative for abdominal pain, diarrhea, nausea and vomiting.   Genitourinary: Positive for decreased urine volume and dysuria. Negative for hematuria.   Musculoskeletal: Negative for back pain, myalgias and neck pain.   Skin: Negative for rash.   Neurological: Negative for weakness and headaches.       Physical Exam     Initial Vitals   BP Pulse Resp Temp SpO2   09/17/18 1158 09/17/18 1158 09/17/18 1158 09/17/18 1158 09/17/18 1300   (!) 166/87 95 20 (!) 102.9 °F (39.4 °C) 98 %      MAP       --                Physical Exam    Constitutional: He appears well-developed. He is Obese . No distress.     Elderly   HENT:   Head: Normocephalic and atraumatic.   Dry oropharynx    Eyes: EOM are normal. Pupils are equal, round, and reactive to  light.   Neck: Neck supple.   Cardiovascular: Normal rate and regular rhythm. Exam reveals no gallop and no friction rub.    No murmur heard.  HR unremarkable   Pulmonary/Chest: Breath sounds normal. No respiratory distress. He has no wheezes. He has no rales.   lung auscultation unremarkable   Abdominal: Soft. Bowel sounds are normal. There is no tenderness. There is no rebound and no guarding.   multiple bandages from recent surgery  No discharge from wounds  No wound erythema   Genitourinary:   Genitourinary Comments: Umana in place  Minimal urine and cloudy appearance in bag   Musculoskeletal: Normal range of motion. He exhibits no tenderness.   Neurological: He has normal strength.   Awake, lethargic, oriented x2. Answers some questions, but intermittently confused.   Skin: Skin is warm. No rash noted. No erythema.   Warm to touch  See abdominal exam   Psychiatric: He has a normal mood and affect.         ED Course   Procedures  Labs Reviewed   CBC W/ AUTO DIFFERENTIAL - Abnormal; Notable for the following components:       Result Value    WBC 28.76 (*)     RBC 4.42 (*)     Hemoglobin 12.5 (*)     Hematocrit 38.6 (*)     Gran% 87.0 (*)     Lymph% 4.0 (*)     All other components within normal limits   COMPREHENSIVE METABOLIC PANEL - Abnormal; Notable for the following components:    Sodium 131 (*)     CO2 22 (*)     Glucose 193 (*)     BUN, Bld 29 (*)     Creatinine 1.8 (*)     Calcium 8.5 (*)     Albumin 2.7 (*)     Total Bilirubin 1.7 (*)     eGFR if  41 (*)     eGFR if non  36 (*)     All other components within normal limits   URINALYSIS, REFLEX TO URINE CULTURE - Abnormal; Notable for the following components:    Color, UA Orange (*)     Appearance, UA Cloudy (*)     Specific Gravity, UA >=1.030 (*)     Protein, UA 3+ (*)     Ketones, UA 1+ (*)     Bilirubin (UA) 2+ (*)     Occult Blood UA 3+ (*)     Nitrite, UA Positive (*)     Leukocytes, UA Trace (*)     All other  components within normal limits    Narrative:     Preferred Collection Type->Urine, Clean Catch   URINALYSIS MICROSCOPIC - Abnormal; Notable for the following components:    RBC, UA 30 (*)     WBC, UA 40 (*)     Bacteria, UA Many (*)     Granular Casts, UA 1 (*)     All other components within normal limits    Narrative:     Preferred Collection Type->Urine, Clean Catch   CULTURE, BLOOD   CULTURE, BLOOD   CULTURE, URINE   CULTURE, STOOL   ENTEROHEMORRHAGIC E.COLI   LACTIC ACID, PLASMA   INFLUENZA A AND B ANTIGEN   OCCULT BLOOD X 1, STOOL   LACTIC ACID, PLASMA   WBC, STOOL   STOOL EXAM-OVA,CYSTS,PARASITES          Imaging Results          CT Abdomen Pelvis  Without Contrast (Final result)  Result time 09/17/18 16:11:31    Final result by Bladimir Marrero MD (09/17/18 16:11:31)                 Impression:      1. No definite acute parenchymal abnormality involving the kidneys, allowing for lack of intravenous contrast.  Grossly unchanged bilateral nonspecific perinephric stranding.  2. Scattered colonic air-fluid levels suggesting a nonspecific diarrheal illness.  3. S/p interval the supraumbilical ventral and also periumbilical hernia repair.  Mild subcutaneous stranding with few non loculated subcutaneous gas foci along the laparotomy site, presumably related to recent surgery.  No organized fluid collection to suggest drainable abscess.  4. Cholecystectomy and appendectomy.  5. Prostatomegaly.  6. Umana catheter.  7. Grossly stable few additional findings as above.      Electronically signed by: Bladimir Marrero MD  Date:    09/17/2018  Time:    16:11             Narrative:    EXAMINATION:  CT ABDOMEN PELVIS WITHOUT CONTRAST    CLINICAL HISTORY:  Sepsis;poss pyelo;    TECHNIQUE:  Low dose axial images, sagittal and coronal reformations were obtained from the lung bases to the pubic symphysis.  No contrast media was administered.    COMPARISON:  Chest radiograph same day, right upper quadrant ultrasound 09/13/2018 and a  CT abdomen and pelvis 08/16/2018    FINDINGS:  Included lung bases show mild dependent atelectasis and scattered areas of platelike scarring versus atelectasis, without focal consolidation.  Base of the heart is normal in size without significant pericardial fluid, noting coronary arterial calcifications.    Grossly stable chronic mild elevation of the right hemidiaphragm as well as right upper quadrant colonic interposition.  Cholecystectomy.  Liver is normal in size without focal process seen.  Noncontrast appearance of the spleen, stomach, duodenum and bilateral adrenal glands are within normal limits.  Pancreas is atrophic similar to prior.  No biliary ductal dilatation.    Bilateral kidneys are grossly stable in size, shape and location.  No hydronephrosis.  Grossly unchanged bilateral nonspecific perinephric stranding.  No radiodense calculus seen within the collecting systems on either side or urinary bladder.  Bilateral ureters are within normal limits.  Urinary bladder is collapsed around a Umana catheter containing small amount of intraluminal gas.  Prostate remains enlarged similar to prior.  Small metallic density again noted at the base of the penis.    No ascites, free air or lymphadenopathy.    Abdominal aorta is atherosclerotic and mildly ectatic but not aneurysmal.    Patient is status post relatively recent supraumbilical and also periumbilical hernia repair.  Small amount of subcutaneous stranding and non loculated subcutaneous gas foci about the midline supraumbilical laparotomy site, presumably related to recent surgery.  No organized fluid collection to suggest drainable abscess.  Surgical changes of prior appendectomy.  Terminal ileum is within normal limits.  Prominent stool ball within the rectum without wall thickening or adjacent inflammation.  Mild amount of fecal material within the distal sigmoid colon.  Scattered colonic air-fluid levels more proximally.  No evidence of bowel  obstruction or inflammation.  No pneumatosis or portal venous gas.    Included osseous structures appear stable without acute process seen.                               X-Ray Chest AP Portable (Final result)  Result time 09/17/18 14:41:04    Final result by Selvin Lao MD (09/17/18 14:41:04)                 Impression:      As above.      Electronically signed by: Selvin Lao MD  Date:    09/17/2018  Time:    14:41             Narrative:    EXAMINATION:  XR CHEST AP PORTABLE    CLINICAL HISTORY:  Sepsis;    TECHNIQUE:  Single frontal view of the chest was performed.    COMPARISON:  09/19/2016    FINDINGS:  No consolidation, pleural effusion or pneumothorax.  Cardiomediastinal silhouette is unremarkable.  Punctate metallic bodies noted in the right chest with several chronic rib fractures, unchanged.                                 Medical Decision Making:   Clinical Tests:   Lab Tests: Reviewed and Ordered  Radiological Study: Ordered and Reviewed  Medical Tests: Ordered and Reviewed                   ED Course as of Sep 17 1736   Mon Sep 17, 2018   1312 WBC: (!) 28.76 [NP]   1312 Hemoglobin: (!) 12.5 [NP]   1313 Platelets: 350 [NP]   1313 Sodium: (!) 131 [NP]   1313 Potassium: 4.5 [NP]   1313 Chloride: 95 [NP]   1313 CO2: (!) 22 [NP]   1313 Glucose: (!) 193 [NP]   1313 BUN, Bld: (!) 29 [NP]   1313 Creatinine: (!) 1.8 [NP]   1313 Calcium: (!) 8.5 [NP]   1313 Total Bilirubin: (!) 1.7 [NP]   1339 Influenza A Ag, EIA: Negative [NP]   1339 Influenza B Ag, EIA: Negative [NP]   1339 Lactate, Shan: 1.8 [NP]   1401 Bacteria, UA: (!) Many [NP]   1401 WBC, UA: (!) 40 [NP]   1405 Pt has uti with sepsis. Will get CT scan of abd.  [NP]      ED Course User Index  [NP] Meet Patel MD     Case d/w Dr. Pantoja, covering for Dr. Lerner, who will admit to their service.      Critical Care time: 30 minutes inclusive of direct patient care, review of previous records, interpretation of labs, imaging and ekg, as well as  discussion of my impression and plan of care with the patient, family and other clinicians/consultants. This time is exclusive of any separate billable procedures and of treating other patients. Critical care was required for this patient who presented with sepsis requiring my emergent evaluation and management in the emergency department.       Clinical Impression:     1. Sepsis    2. Deformity of clavicle    3. Urinary tract infection associated with indwelling urethral catheter, initial encounter        I, Dr. Meet Patel, personally performed the services described in this documentation.   All medical record entries made by the scribe were at my direction and in my presence.   I have reviewed the chart and agree that the record is accurate and complete.   Meet Patel MD.       Meet Patel MD  09/21/18 [id number]

## 2018-09-17 NOTE — ED NOTES
Pt had large bm. Cleaned, fresh pads applied. Immediately c/o need to have a bm again. Placed on bedpan.

## 2018-09-17 NOTE — H&P
[facility]  History & Physical     Reason for Admission: UTI, diarrhea, IVVD, leukocytosis    History of Present Illness:   The patient is a 78 y.o. male with h/o DM, HTN, HLD s/p combo lap/open incisional hernia repairs on 9/5/18 at [facility]. Post op course was complicated by acute urinary retention s/p multiple failed voiding trials. Pt was d/c's home on 9/8/18 on Flomax with outpt Urology consult. Over the past 4 days the patient has been feeling poor, with decreased appetite, malaise and diarrhea. Uncertain if he had any fevers. Denied chills, night sweats, nausea, vomiting, cp, sob, abd pain. Presented to [place] ER and found to have a leukocytosis of 28.76. Sepsis protocol was initiated, Blood cx, UA, UCx drawn as well as stool studies due to diarrhea. UA + so Rocephin ordered and hutton replaced. Lactic acid WNL. Creat elevated to 1.7 from baseline around 0.7-1.0.    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    Essential hypertension 09/22/2016    HLD (hyperlipidemia) 09/22/2016    DM type 2 with diabetic peripheral neuropathy 09/22/2016    Uncontrolled type 2 diabetes mellitus, with long-term current use of insulin 09/22/2016        Past Medical History:   Diagnosis Date    Diabetes mellitus, type 2     High cholesterol     History of hepatitis C; S/p RX with SVR 23 documented 07/2017 7/18/2017    Hypertension       Past Surgical History:   Procedure Laterality Date    CHOLECYSTECTOMY      CORONARY STENT PLACEMENT      EXPLORATORY LAPAROTOMY W/ BOWEL RESECTION  09/19/2016    ileocecectomy     EXPLORATORY-LAPAROTOMY N/A 9/19/2016    Performed by [facility] OR    ILEOCECECTOMY  9/19/2016    [facility] by [facility]o [facility] at [facility] OR    LYSIS OF ADHESIONS N/A 9/5/2018    Procedure: LYSIS, ADHESIONS;  Surgeon: Ariella Lerner DO;  [facility]: [facility];  Service: General;  Laterality: N/A;    LYSIS, ADHESIONS N/A 9/5/2018    [facility] OR    OMENTECTOMY N/A 9/5/2018    Procedure: OMENTECTOMY;  Surgeon: Ariella Lerner DO;  [facility]: [facility] OR;  Service: General;  Laterality: N/A;    OMENTECTOMY N/A 9/5/2018    Performed by [facility] at [facility] OR    REPAIR,HERNIA,INCISIONAL OR VENTRAL,WITHOUT HISTORY OF PRIOR REPAIR(MIDLINE HERNIA DEFECT 6CMx3.6CM) (LEFT LATERAL HERNIA DEFECT [id number]) (HYBRID) N/A 9/5/2018    Performed by [facility]ella Lerner DO at [facility] OR      No family history on file.   Social History     Tobacco Use    Smoking status: Current Every Day Smoker     Packs/day: 0.50     Types: Cigarettes    Smokeless tobacco: Never Used    Tobacco comment: APPROX 3 CIGARETTES A DAY   Substance Use Topics    Alcohol use: No          (Not in a hospital admission)  Review of patient's allergies indicates:  No Known Allergies     Review of Systems    Temp:  [99.8 °F (37.7 °C)-102.9 °F (39.4 °C)] 99.8 °F (37.7 °C)  Pulse:  [] 84  Resp:  [20-29] 24  SpO2:  [97 %-100 %] 100 %  BP: (111-166)/(61-87) 137/64      GEN: NAD, AAOx3  CV: RRR  RESP: NLB, clear, no distress  ABD: Soft, NT, ND, all incisions intact without SOI  EXT: No edema    LABS: see Epic tab    CT A/P w/o:  1. No definite acute parenchymal abnormality involving the kidneys, allowing for lack of intravenous contrast.  Grossly unchanged bilateral nonspecific perinephric stranding.  2. Scattered colonic air-fluid levels suggesting a nonspecific diarrheal illness.  3. S/p interval the supraumbilical ventral and also periumbilical hernia repair.  Mild subcutaneous stranding  with few non loculated subcutaneous gas foci along the laparotomy site, presumably related to recent surgery.  No organized fluid collection to suggest drainable abscess.  4. Cholecystectomy and appendectomy.  5. Prostatomegaly.  6. Umana catheter.  7. Grossly stable few additional findings as above    Assessment and Plan  77 y/o BM s/p incisional hernia repair with acute urinary retention, now with UTI, leukocytosis, diarrhea, IVVD  - Admit to Surgery  - Rocephin for UTI. Also need to consider C.diff as a possible source of infection, currently abd exam and CT do not indicate fulminant colitis.   - Follow up C.diff, Urine Cx, Blood Cx, stool studies  - Resuscitated with 2.7 L in ER, start LR at 100 cc/hr, encourage PO intake, monitor UOP and kidney function  - Umana to remain in place for time being due to failed voiding trials, continue Flomax, will consider bladder training and Urology consult tomorrow  - Diabetic diet with Lantus 15u QHD and SSI  - Home dose Amlodipine, statin  - Lovenox QD, encourage OOBTC/amnulation

## 2018-09-17 NOTE — ED NOTES
Pt c/o abdominal discomfort, feeling tired and weak and generalize body aches; he had fever on arrival is clammy to touch; pt had hernia repair 2 wks ago and reports the catheter in place has been in place since the surgery; the leg bag appears soiled with moderate amounts of sediment noted to approx 100ml of dark cloudy yellow urine; steri strips to abdomen in place. Did not arrive with SL due to ems not being able to obtain despite multiple attempts.

## 2018-09-17 NOTE — NURSING
Received from emergency department via stretcher on stable conditions. Orientation to room provided, voices understanding. Care plan reviewed with patient, AAOx4, no respiratory distress noted, on room air. NSR per cardiac monitor, no red alarm noted. Denies any pain of discomfort, education provided on medication effect and side effect, voices understanding. Umana catheter in place draining 150cc of clear yellow urine, redness to right tight noted, no pain voices. Right eye blind as per patient, safety measures maintained, call light within his reach, no apparent distress noted, bed in low position, bed alarm on, educated on the importance of calling as needed, voices understanding, stable at this time. Report to nurse Hidalgo provided.

## 2018-09-18 PROBLEM — T83.511A URINARY TRACT INFECTION ASSOCIATED WITH INDWELLING URETHRAL CATHETER: Status: ACTIVE | Noted: 2018-09-18

## 2018-09-18 PROBLEM — N40.1 BENIGN PROSTATIC HYPERPLASIA WITH URINARY RETENTION: Status: ACTIVE | Noted: 2018-09-18

## 2018-09-18 PROBLEM — R33.8 BENIGN PROSTATIC HYPERPLASIA WITH URINARY RETENTION: Status: ACTIVE | Noted: 2018-09-18

## 2018-09-18 PROBLEM — N39.0 URINARY TRACT INFECTION ASSOCIATED WITH INDWELLING URETHRAL CATHETER: Status: ACTIVE | Noted: 2018-09-18

## 2018-09-18 LAB
ANION GAP SERPL CALC-SCNC: 8 MMOL/L
BASOPHILS # BLD AUTO: 0.02 K/UL
BASOPHILS NFR BLD: 0.1 %
BUN SERPL-MCNC: 30 MG/DL
CALCIUM SERPL-MCNC: 8 MG/DL
CHLORIDE SERPL-SCNC: 102 MMOL/L
CO2 SERPL-SCNC: 22 MMOL/L
CREAT SERPL-MCNC: 1.5 MG/DL
DIFFERENTIAL METHOD: ABNORMAL
EOSINOPHIL # BLD AUTO: 0.2 K/UL
EOSINOPHIL NFR BLD: 0.8 %
ERYTHROCYTE [DISTWIDTH] IN BLOOD BY AUTOMATED COUNT: 14 %
EST. GFR  (AFRICAN AMERICAN): 51 ML/MIN/1.73 M^2
EST. GFR  (NON AFRICAN AMERICAN): 44 ML/MIN/1.73 M^2
GLUCOSE SERPL-MCNC: 105 MG/DL
HCT VFR BLD AUTO: 32.5 %
HGB BLD-MCNC: 10.8 G/DL
LYMPHOCYTES # BLD AUTO: 1.1 K/UL
LYMPHOCYTES NFR BLD: 5.9 %
MAGNESIUM SERPL-MCNC: 1.7 MG/DL
MCH RBC QN AUTO: 28.9 PG
MCHC RBC AUTO-ENTMCNC: 33.2 G/DL
MCV RBC AUTO: 87 FL
MONOCYTES # BLD AUTO: 1.9 K/UL
MONOCYTES NFR BLD: 9.7 %
NEUTROPHILS # BLD AUTO: 16.1 K/UL
NEUTROPHILS NFR BLD: 83.2 %
PHOSPHATE SERPL-MCNC: 2.3 MG/DL
PLATELET # BLD AUTO: 347 K/UL
PMV BLD AUTO: 9.4 FL
POCT GLUCOSE: 132 MG/DL (ref 70–110)
POCT GLUCOSE: 196 MG/DL (ref 70–110)
POCT GLUCOSE: 94 MG/DL (ref 70–110)
POCT GLUCOSE: 95 MG/DL (ref 70–110)
POTASSIUM SERPL-SCNC: 3.6 MMOL/L
RBC # BLD AUTO: 3.74 M/UL
SODIUM SERPL-SCNC: 132 MMOL/L
WBC # BLD AUTO: 19.32 K/UL

## 2018-09-18 PROCEDURE — 80048 BASIC METABOLIC PNL TOTAL CA: CPT

## 2018-09-18 PROCEDURE — 63600175 PHARM REV CODE 636 W HCPCS: Performed by: SURGERY

## 2018-09-18 PROCEDURE — 99223 1ST HOSP IP/OBS HIGH 75: CPT | Mod: ,,, | Performed by: STUDENT IN AN ORGANIZED HEALTH CARE EDUCATION/TRAINING PROGRAM

## 2018-09-18 PROCEDURE — 85025 COMPLETE CBC W/AUTO DIFF WBC: CPT

## 2018-09-18 PROCEDURE — 99232 SBSQ HOSP IP/OBS MODERATE 35: CPT | Mod: 24,,, | Performed by: SURGERY

## 2018-09-18 PROCEDURE — 25000003 PHARM REV CODE 250: Performed by: SURGERY

## 2018-09-18 PROCEDURE — 36415 COLL VENOUS BLD VENIPUNCTURE: CPT

## 2018-09-18 PROCEDURE — 94761 N-INVAS EAR/PLS OXIMETRY MLT: CPT

## 2018-09-18 PROCEDURE — 94799 UNLISTED PULMONARY SVC/PX: CPT

## 2018-09-18 PROCEDURE — 25000003 PHARM REV CODE 250: Performed by: STUDENT IN AN ORGANIZED HEALTH CARE EDUCATION/TRAINING PROGRAM

## 2018-09-18 PROCEDURE — 11000001 HC ACUTE MED/SURG PRIVATE ROOM

## 2018-09-18 PROCEDURE — 84100 ASSAY OF PHOSPHORUS: CPT

## 2018-09-18 PROCEDURE — 25000003 PHARM REV CODE 250: Performed by: EMERGENCY MEDICINE

## 2018-09-18 PROCEDURE — 83735 ASSAY OF MAGNESIUM: CPT

## 2018-09-18 PROCEDURE — 63600175 PHARM REV CODE 636 W HCPCS: Performed by: EMERGENCY MEDICINE

## 2018-09-18 RX ORDER — DIPHENHYDRAMINE HCL 25 MG
25 CAPSULE ORAL EVERY 6 HOURS PRN
Status: DISCONTINUED | OUTPATIENT
Start: 2018-09-18 | End: 2018-09-24 | Stop reason: HOSPADM

## 2018-09-18 RX ORDER — PANTOPRAZOLE SODIUM 40 MG/1
40 TABLET, DELAYED RELEASE ORAL DAILY
Status: DISCONTINUED | OUTPATIENT
Start: 2018-09-18 | End: 2018-09-24 | Stop reason: HOSPADM

## 2018-09-18 RX ORDER — FINASTERIDE 5 MG/1
5 TABLET, FILM COATED ORAL DAILY
Status: DISCONTINUED | OUTPATIENT
Start: 2018-09-18 | End: 2018-09-24 | Stop reason: HOSPADM

## 2018-09-18 RX ADMIN — SIMVASTATIN 40 MG: 20 TABLET, FILM COATED ORAL at 08:09

## 2018-09-18 RX ADMIN — PANTOPRAZOLE SODIUM 40 MG: 40 TABLET, DELAYED RELEASE ORAL at 10:09

## 2018-09-18 RX ADMIN — FINASTERIDE 5 MG: 5 TABLET, FILM COATED ORAL at 01:09

## 2018-09-18 RX ADMIN — TAMSULOSIN HYDROCHLORIDE 0.4 MG: 0.4 CAPSULE ORAL at 08:09

## 2018-09-18 RX ADMIN — ACETAMINOPHEN 650 MG: 325 TABLET, FILM COATED ORAL at 08:09

## 2018-09-18 RX ADMIN — INSULIN DETEMIR 15 UNITS: 100 INJECTION, SOLUTION SUBCUTANEOUS at 08:09

## 2018-09-18 RX ADMIN — AMLODIPINE BESYLATE 10 MG: 5 TABLET ORAL at 08:09

## 2018-09-18 RX ADMIN — DIPHENHYDRAMINE HYDROCHLORIDE 25 MG: 25 CAPSULE ORAL at 08:09

## 2018-09-18 RX ADMIN — ENOXAPARIN SODIUM 40 MG: 100 INJECTION SUBCUTANEOUS at 04:09

## 2018-09-18 RX ADMIN — SODIUM CHLORIDE, SODIUM LACTATE, POTASSIUM CHLORIDE, AND CALCIUM CHLORIDE: .6; .31; .03; .02 INJECTION, SOLUTION INTRAVENOUS at 04:09

## 2018-09-18 RX ADMIN — SODIUM CHLORIDE, SODIUM LACTATE, POTASSIUM CHLORIDE, AND CALCIUM CHLORIDE: .6; .31; .03; .02 INJECTION, SOLUTION INTRAVENOUS at 03:09

## 2018-09-18 RX ADMIN — CEFTRIAXONE 1 G: 1 INJECTION, SOLUTION INTRAVENOUS at 01:09

## 2018-09-18 NOTE — SUBJECTIVE & OBJECTIVE
Interval History: Pt reports feeling tired. No n/v. Decreased appetite. Had diarrhea, nothing since admit. Stool studies pending. cdiff needs to be collected. No fevers o/n. Hasn't seen urology yet. Large prostate on CT. Started flomax 9/6/18. Pt had episode last week where leg bag became uncapped and leaked. Leg bag remained uncapped overnight, until patient seen in ED and bag recapped 9/10/18.     Medications:  Continuous Infusions:   lactated ringers 100 mL/hr at 09/18/18 0425     Scheduled Meds:   amLODIPine  10 mg Oral Daily    cefTRIAXone (ROCEPHIN) IVPB  1 g Intravenous Q12H    enoxaparin  40 mg Subcutaneous Daily    insulin detemir U-100  15 Units Subcutaneous QHS    simvastatin  40 mg Oral QHS    tamsulosin  0.4 mg Oral Daily     PRN Meds:acetaminophen, acetaminophen, glucagon (human recombinant), glucose, glucose, influenza, insulin aspart U-100, ondansetron, sodium chloride 0.9%     Review of patient's allergies indicates:  No Known Allergies  Objective:     Vital Signs (Most Recent):  Temp: 98.8 °F (37.1 °C) (09/18/18 0707)  Pulse: 83 (09/18/18 0800)  Resp: 13 (09/18/18 0707)  BP: (!) 129/90 (09/18/18 0707)  SpO2: 100 % (09/18/18 0754) Vital Signs (24h Range):  Temp:  [96 °F (35.6 °C)-102.9 °F (39.4 °C)] 98.8 °F (37.1 °C)  Pulse:  [] 83  Resp:  [13-29] 13  SpO2:  [95 %-100 %] 100 %  BP: (111-166)/(61-90) 129/90     Weight: 92.8 kg (204 lb 9.4 oz)  Body mass index is 30.21 kg/m².    Intake/Output - Last 3 Shifts       09/16 0700 - 09/17 0659 09/17 0700 - 09/18 0659 09/18 0700 - 09/19 0659    P.O.  120     I.V. (mL/kg)  713.3 (7.9)     IV Piggyback  2821     Total Intake(mL/kg)  3654.3 (40.3)     Urine (mL/kg/hr)  157 525 (2.4)    Stool  0     Total Output  157 525    Net  +3497.3 -525           Stool Occurrence  1 x           Physical Exam   Constitutional: He is oriented to person, place, and time. He appears well-developed and well-nourished. No distress.   HENT:   Head: Normocephalic and  atraumatic.   Eyes: No scleral icterus.   Neck: Normal range of motion. Neck supple. No JVD present.   Cardiovascular: Normal rate and regular rhythm.   Pulmonary/Chest: Effort normal and breath sounds normal. No respiratory distress.   Abdominal: Soft. Bowel sounds are normal. He exhibits no distension. There is no tenderness. There is no rebound and no guarding.   Incisions c/d/i, no erythema, no ecchymosis   Musculoskeletal: Normal range of motion. He exhibits no edema or tenderness.   Neurological: He is alert and oriented to person, place, and time. No cranial nerve deficit.   Skin: Skin is warm and dry. He is not diaphoretic.   Psychiatric: He has a normal mood and affect.       Significant Labs:  CBC:   Recent Labs   Lab  09/18/18   0336   WBC  19.32*   RBC  3.74*   HGB  10.8*   HCT  32.5*   PLT  347   MCV  87   MCH  28.9   MCHC  33.2     CMP:   Recent Labs   Lab  09/17/18   1222  09/18/18   0336   GLU  193*  105   CALCIUM  8.5*  8.0*   ALBUMIN  2.7*   --    PROT  6.4   --    NA  131*  132*   K  4.5  3.6   CO2  22*  22*   CL  95  102   BUN  29*  30*   CREATININE  1.8*  1.5*   ALKPHOS  78   --    ALT  13   --    AST  19   --    BILITOT  1.7*   --      Microbiology Results (last 7 days)     Procedure Component Value Units Date/Time    Blood culture x two cultures. Draw prior to antibiotics. [[id number]] Collected:  09/17/18 1222    Order Status:  Completed Specimen:  Blood from Peripheral, Antecubital, Left Updated:  09/18/18 0315     Blood Culture, Routine No Growth to date    Narrative:       Aerobic and anaerobic    Blood culture x two cultures. Draw prior to antibiotics. [[id number]] Collected:  09/17/18 1219    Order Status:  Completed Specimen:  Blood from Peripheral, Hand, Right Updated:  09/18/18 0315     Blood Culture, Routine No Growth to date    Narrative:       Aerobic and anaerobic    Stool culture **CANNOT BE ORDERED STAT** [[id number]] Collected:  09/17/18 1529    Order Status:  Sent Specimen:   Stool Updated:  09/17/18 1937    E. coli 0157 antigen [[id number]] Collected:  09/17/18 1529    Order Status:  No result Specimen:  Stool Updated:  09/17/18 1937    Clostridium difficile EIA [[id number]]     Order Status:  No result Specimen:  Stool     Urine culture [[id number]] Collected:  09/17/18 1328    Order Status:  No result Specimen:  Urine Updated:  09/17/18 1345        Urinalysis  Recent Labs   Lab  09/17/18   1328   COLORU  Orange*   SPECGRAV  >=1.030*   PHUR  6.0   PROTEINUA  3+*   BACTERIA  Many*   NITRITE  Positive*   LEUKOCYTESUR  Trace*   UROBILINOGEN  1.0   HYALINECASTS  0       Significant Diagnostics:  CT A/P reviewed.   1. No definite acute parenchymal abnormality involving the kidneys, allowing for lack of intravenous contrast.  Grossly unchanged bilateral nonspecific perinephric stranding.  2. Scattered colonic air-fluid levels suggesting a nonspecific diarrheal illness.  3. S/p interval the supraumbilical ventral and also periumbilical hernia repair.  Mild subcutaneous stranding with few non loculated subcutaneous gas foci along the laparotomy site, presumably related to recent surgery.  No organized fluid collection to suggest drainable abscess.  4. Cholecystectomy and appendectomy.  5. Prostatomegaly.  6. Umana catheter.  7. Grossly stable few additional findings as above

## 2018-09-18 NOTE — PLAN OF CARE
Problem: Patient Care Overview  Goal: Plan of Care Review  Outcome: Ongoing (interventions implemented as appropriate)  The pt has slept soundly since arriving to the floor.  He stated he was very tired and didn't want to answer questions at the time of his admit.  He is on telemetry running A-fib with a right BBB and occasional PVCs.  He has had not complaints.  Pt turns himself frequently while sleeping.  Pt has a hutton cath. Draining a dark ry urine.  Pt is receiving IV antibiotics.  He is in Special Contact isolation for C-Dif.  He had a stool during the shift but it was not liquid.  Pt has had no falls.

## 2018-09-18 NOTE — ASSESSMENT & PLAN NOTE
Unfortunately pt has been non-compliant with urology f/u  Flomax and Umana placed for untreated BPH and urinary retention noted after hernia repair  Urology consulted, case discussed, may consider Finasteride

## 2018-09-18 NOTE — HPI
78 y.o. male patient of Dr. Lerner with general surgery. He is s/p an open and lap hernia repair on 9/5/18. Postoperatively on day 1 he developed urinary retention and a hutton was inserted which drained 1 liter of urine from his bladder and has been on flomax since that time. Another voiding trial was attempted on POD 3 but was unsuccessful. He was discharged with urology followup with Dr. Lam but was readmitted recently for infection concerns before he could make it to the clinic visit.    He is unsure if he has been taking flomax as an outpatient. He states he was taking medications that he was 'sent out' with and was not exactly sure the name of the medication flomax. He states he never had issues with retention before this. Has a history of an enlarged prostate but was not taking any BPH meds prior to hernia surgery.    Past Medical History:   Diagnosis Date    Diabetes mellitus, type 2     High cholesterol     History of hepatitis C; S/p RX with SVR 23 documented 07/2017 7/18/2017    Hypertension      Past Surgical History:   Procedure Laterality Date    CHOLECYSTECTOMY      CORONARY STENT PLACEMENT      EXPLORATORY LAPAROTOMY W/ BOWEL RESECTION  09/19/2016    ileocecectomy    EXPLORATORY-LAPAROTOMY N/A 9/19/2016    Performed by [facility] at [facility] OR    ILEOCECECTOMY  9/19/2016    [facility] at [facility] OR    LYSIS OF ADHESIONS N/A 9/5/2018    Procedure: LYSIS, ADHESIONS;  Surgeon: Ariella Lerner DO;  Location: [facility] OR;  Service: General;  Laterality: N/A;    LYSIS, ADHESIONS N/A 9/5/2018    Performed by Ariella Lerner DO at [facility] OR    OMENTECTOMY N/A 9/5/2018    Procedure: OMENTECTOMY;  Surgeon: Ariella Lerner DO;  Location: [facility] OR;  Service: General;  Laterality: N/A;    OMENTECTOMY N/A 9/5/2018    Performed by Ariella Lerner DO at [facility] OR    REPAIR,HERNIA,INCISIONAL OR VENTRAL,WITHOUT HISTORY OF PRIOR REPAIR(MIDLINE HERNIA DEFECT 6CMx3.6CM)  "(LEFT LATERAL HERNIA DEFECT 4.3AKe4WP) (HYBRID) N/A 9/5/2018    Per[facility] at [facility] OR     History reviewed. No pertinent family history.  Social History     Tobacco Use    Smoking status: Current Every Day Smoker     Packs/day: 0.50     Types: Cigarettes    Smokeless tobacco: Never Used    Tobacco comment: APPROX 3 CIGARETTES A DAY   Substance Use Topics    Alcohol use: No    Drug use: Yes     Types: Marijuana     Comment: medical     No current facility-administered medications on file prior to encounter.      Current Outpatient Medications on File Prior to Encounter   Medication Sig Dispense Refill    amlodipine (NORVASC) 10 MG tablet Take 1 tablet (10 mg total) by mouth once daily. 30 tablet 5    aspirin (ECOTRIN) 325 MG EC tablet Take 325 mg by mouth once daily.      BD INSULIN PEN NEEDLE UF SHORT 31 gauge x 5/16" Ndle       fluticasone (FLONASE) 50 mcg/actuation nasal spray 1 spray (50 mcg total) by Each Nare route once daily. 15 g 0    glimepiride (AMARYL) 4 MG tablet Take 4 mg by mouth once daily.      insulin glargine (LANTUS) 100 unit/mL injection Inject 15 Units into the skin every evening. (Patient taking differently: Inject 30 Units into the skin every evening. ) 5 vial 5    insulin syringe-needle U-100 0.3 mL 30 gauge x 5/16 Syrg       meclizine (ANTIVERT) 25 mg tablet Take 1 tablet (25 mg total) by mouth 3 (three) times daily as needed for Dizziness. 20 tablet 0    oxyCODONE-acetaminophen (PERCOCET) 5-325 mg per tablet Take 1 tablet by mouth every 4 (four) hours as needed for Pain. 30 tablet 0    polyethylene glycol (GLYCOLAX) 17 gram PwPk Take 17 g by mouth once daily. 10 packet 0    senna-docusate 8.6-50 mg (PERICOLACE) 8.6-50 mg per tablet Take 2 tablets by mouth 2 (two) times daily.      simvastatin (ZOCOR) 40 MG tablet Take 1 tablet (40 mg total) by mouth every evening. 30 tablet 5    sitagliptin (JANUVIA) 100 MG Tab Take 1 tablet (100 mg total) by mouth " once daily. 30 tablet 5    tamsulosin (FLOMAX) 0.4 mg Cap Take 1 capsule (0.4 mg total) by mouth every evening. 30 capsule 0     Review of patient's allergies indicates:  No Known Allergies

## 2018-09-18 NOTE — ASSESSMENT & PLAN NOTE
Lab Results   Component Value Date    HGBA1C 5.8 (H) 09/05/2018     Strict BG control and accu checks

## 2018-09-18 NOTE — PLAN OF CARE
Problem: Patient Care Overview  Goal: Plan of Care Review  Outcome: Ongoing (interventions implemented as appropriate)  Plan of care reviewed with patient. Call light within reach, fall precautions maintained, bed alarm set. Special contact maintained. Stool collection pending. Umana catheter remains in place for UR. Patient aware. Nurse instructed patient to call if needs assistance. Patient verbalized complete understanding. Telemetry monitor SR throughout shift.LR infusing at 100 mL/hr. Accuchecks done achs; no coverage needed. Pt reports decreased appetite. NAD noted. Will continue to monitor and continue plan of care.

## 2018-09-18 NOTE — ASSESSMENT & PLAN NOTE
Likely related to UTI/Umana catheter  UA +, cx pending  Rocephin  Stool studies pending  Cont supportive care

## 2018-09-18 NOTE — PT/OT/SLP PROGRESS
Physical Therapy      Patient Name:  Jose Roca   MRN:  [id number]    Patient refused this pm reports he is too fatigued. Will follow up as able      Clement Bernal, PT

## 2018-09-18 NOTE — PROGRESS NOTES
[facility]  General Surgery  Progress Note    Subjective:     History of Present Illness:  No notes on file    Post-Op Info:  * No surgery found *         Interval History: Pt reports feeling tired. No n/v. Decreased appetite. Had diarrhea, nothing since admit. Stool studies pending. cdiff needs to be collected. No fevers o/n. Hasn't seen urology yet. Large prostate on CT. Started flomax 9/6/18. Pt had episode last week where leg bag became uncapped and leaked. Leg bag remained uncapped overnight, until patient seen in ED and bag recapped 9/10/18.     Medications:  Continuous Infusions:   lactated ringers 100 mL/hr at 09/18/18 0425     Scheduled Meds:   amLODIPine  10 mg Oral Daily    cefTRIAXone (ROCEPHIN) IVPB  1 g Intravenous Q12H    enoxaparin  40 mg Subcutaneous Daily    insulin detemir U-100  15 Units Subcutaneous QHS    simvastatin  40 mg Oral QHS    tamsulosin  0.4 mg Oral Daily     PRN Meds:acetaminophen, acetaminophen, glucagon (human recombinant), glucose, glucose, influenza, insulin aspart U-100, ondansetron, sodium chloride 0.9%     Review of patient's allergies indicates:  No Known Allergies  Objective:     Vital Signs (Most Recent):  Temp: 98.8 °F (37.1 °C) (09/18/18 0707)  Pulse: 83 (09/18/18 0800)  Resp: 13 (09/18/18 0707)  BP: (!) 129/90 (09/18/18 0707)  SpO2: 100 % (09/18/18 0754) Vital Signs (24h Range):  Temp:  [96 °F (35.6 °C)-102.9 °F (39.4 °C)] 98.8 °F (37.1 °C)  Pulse:  [] 83  Resp:  [13-29] 13  SpO2:  [95 %-100 %] 100 %  BP: (111-166)/(61-90) 129/90     Weight: 92.8 kg (204 lb 9.4 oz)  Body mass index is 30.21 kg/m².    Intake/Output - Last 3 Shifts       09/16 0700 - 09/17 0659 09/17 0700 - 09/18 0659 09/18 0700 - 09/19 0659    P.O.  120     I.V. (mL/kg)  713.3 (7.9)     IV Piggyback  2821     Total Intake(mL/kg)  3654.3 (40.3)     Urine (mL/kg/hr)  157 525 (2.4)    Stool  0     Total Output  157 525    Net  +3497.3 -525           Stool Occurrence  1 x            Physical Exam   Constitutional: He is oriented to person, place, and time. He appears well-developed and well-nourished. No distress.   HENT:   Head: Normocephalic and atraumatic.   Eyes: No scleral icterus.   Neck: Normal range of motion. Neck supple. No JVD present.   Cardiovascular: Normal rate and regular rhythm.   Pulmonary/Chest: Effort normal and breath sounds normal. No respiratory distress.   Abdominal: Soft. Bowel sounds are normal. He exhibits no distension. There is no tenderness. There is no rebound and no guarding.   Incisions c/d/i, no erythema, no ecchymosis   Musculoskeletal: Normal range of motion. He exhibits no edema or tenderness.   Neurological: He is alert and oriented to person, place, and time. No cranial nerve deficit.   Skin: Skin is warm and dry. He is not diaphoretic.   Psychiatric: He has a normal mood and affect.       Significant Labs:  CBC:   Recent Labs   Lab  09/18/18   0336   WBC  19.32*   RBC  3.74*   HGB  10.8*   HCT  32.5*   PLT  347   MCV  87   MCH  28.9   MCHC  33.2     CMP:   Recent Labs   Lab  09/17/18   1222  09/18/18   0336   GLU  193*  105   CALCIUM  8.5*  8.0*   ALBUMIN  2.7*   --    PROT  6.4   --    NA  131*  132*   K  4.5  3.6   CO2  22*  22*   CL  95  102   BUN  29*  30*   CREATININE  1.8*  1.5*   ALKPHOS  78   --    ALT  13   --    AST  19   --    BILITOT  1.7*   --      Microbiology Results (last 7 days)     Procedure Component Value Units Date/Time    Blood culture x two cultures. Draw prior to antibiotics. [[id number]] Collected:  09/17/18 1222    Order Status:  Completed Specimen:  Blood from Peripheral, Antecubital, Left Updated:  09/18/18 0315     Blood Culture, Routine No Growth to date    Narrative:       Aerobic and anaerobic    Blood culture x two cultures. Draw prior to antibiotics. [[id number]] Collected:  09/17/18 1219    Order Status:  Completed Specimen:  Blood from Peripheral, Hand, Right Updated:  09/18/18 0315     Blood Culture, Routine No  Growth to date    Narrative:       Aerobic and anaerobic    Stool culture **CANNOT BE ORDERED STAT** [[id number]] Collected:  09/17/18 1529    Order Status:  Sent Specimen:  Stool Updated:  09/17/18 1937    E. coli 0157 antigen [[id number]] Collected:  09/17/18 1529    Order Status:  No result Specimen:  Stool Updated:  09/17/18 1937    Clostridium difficile EIA [[id number]]     Order Status:  No result Specimen:  Stool     Urine culture [[id number]] Collected:  09/17/18 1328    Order Status:  No result Specimen:  Urine Updated:  09/17/18 1345        Urinalysis  Recent Labs   Lab  09/17/18   1328   COLORU  Orange*   SPECGRAV  >=1.030*   PHUR  6.0   PROTEINUA  3+*   BACTERIA  Many*   NITRITE  Positive*   LEUKOCYTESUR  Trace*   UROBILINOGEN  1.0   HYALINECASTS  0       Significant Diagnostics:  CT A/P reviewed.   1. No definite acute parenchymal abnormality involving the kidneys, allowing for lack of intravenous contrast.  Grossly unchanged bilateral nonspecific perinephric stranding.  2. Scattered colonic air-fluid levels suggesting a nonspecific diarrheal illness.  3. S/p interval the supraumbilical ventral and also periumbilical hernia repair.  Mild subcutaneous stranding with few non loculated subcutaneous gas foci along the laparotomy site, presumably related to recent surgery.  No organized fluid collection to suggest drainable abscess.  4. Cholecystectomy and appendectomy.  5. Prostatomegaly.  6. Umana catheter.  7. Grossly stable few additional findings as above    Assessment/Plan:     * Sepsis    Likely related to UTI/Umana catheter  UA +, cx pending  Rocephin  Stool studies pending  Cont supportive care          Urinary tract infection associated with indwelling urethral catheter    Unfortunately pt has been non-compliant with urology f/u  Flomax and Umana placed for untreated BPH and urinary retention noted after hernia repair  Urology consulted, case discussed, may consider Finasteride           Benign  prostatic hyperplasia with urinary retention    Flomax ordered  Await urology recs        Incisional hernia, without obstruction or gangrene    Recovery well.  PT consulted        DM type 2 with diabetic peripheral neuropathy    Lab Results   Component Value Date    HGBA1C 5.8 (H) 09/05/2018     Strict BG control and accu checks        Smoker    Smoking cessation encouraged        Obesity, Class I, BMI 30-34.9    Low fat DM diet        History of hepatitis C; S/p RX with SVR 23 documented 07/2017    Successfully treated        Essential hypertension    Home meds            Ariella Lerner, DO  General Surgery  [facility]

## 2018-09-18 NOTE — ASSESSMENT & PLAN NOTE
78 y.o. male with BPH, recent postoperative urinary retention s/p hernia repair    Plan:  1. Counseled patient should have hutton remaining indwelling for now.  2. Continue both flomax and finasteride as an outpatient. I ordered finasteride to be administered while he is an inpatient.   3. Please have patient followup with me in clinic about 7-10 days after discharge for a voiding trial. It is possible that due to his age, comorbidities such as diabetes, he may have a hypocontractile versus a neurogenic bladder. If he fails the voiding trial, it is possible that he may need further testing such as a urodynamics procedure, possible cystoscopy for evaluation of prostatic urethra, etc.

## 2018-09-18 NOTE — NURSING
"Pt keeps saying that he is feeling "really bad," but he is unable to be specific enough for me to do anything for him.  I explained about the IVFs and antibiotics he was getting and that should help him start to feel better soon.  I fixed is blankets and tucked him in to warm him up.  "

## 2018-09-18 NOTE — SUBJECTIVE & OBJECTIVE
Past Medical History:   Diagnosis Date    Diabetes mellitus, type 2     High cholesterol     History of hepatitis C; S/p RX with SVR 23 documented 07/2017 7/18/2017    Hypertension        Past Surgical History:   Procedure Laterality Date    CHOLECYSTECTOMY      CORONARY STENT PLACEMENT      EXPLORATORY LAPAROTOMY W/ BOWEL RESECTION  09/19/2016    ileocecectomy    EXPLORATORY-LAPAROTOMY N/A 9/19/2016    [facility]    ILEOCECECTOMY  9/19/2016    [facility] at [facility] OR    LYSIS OF ADHESIONS N/A 9/5/2018    Procedure: LYSIS, ADHESIONS;  Surgeon: Ariella Lerner DO;  Location: [facility] OR;  Service: General;  Laterality: N/A;    LYSIS, ADHESIONS N/A 9/5/2018    Performed by [facility] at [facility] OR    OMENTECTOMY N/A 9/5/2018    Procedure: OMENTECTOMY;  Surgeon: Ariella Lerner DO;  Location: [facility] OR;  Service: General;  Laterality: N/A;    OMENTECTOMY N/A 9/5/2018    Performed by [facility] DO at [facility] OR    REPAIR,HERNIA,INCISIONAL OR VENTRAL,WITHOUT HISTORY OF PRIOR REPAIR(MIDLINE HERNIA DEFECT 6CMx3.6CM) (LEFT LATERAL HERNIA DEFECT 4.3AVh4GT) (HYBRID) N/A 9/5/2018    Performed by Ariella Lerner DO at [facility] OR       Review of patient's allergies indicates:  No Known Allergies    Family History     None          Tobacco Use    Smoking status: Current Every Day Smoker     Packs/day: 0.50     Types: Cigarettes    Smokeless tobacco: Never Used    Tobacco comment: APPROX 3 CIGARETTES A DAY   Substance and Sexual Activity    Alcohol use: No    Drug use: Yes     Types: Marijuana     Comment: medical    Sexual activity: Not on file       Review of Systems   Constitutional: Negative for activity change.   HENT: Negative for facial swelling.    Eyes: Negative for visual disturbance.   Respiratory: Negative for chest tightness.    Cardiovascular: Negative for chest pain.   Gastrointestinal: Negative for abdominal distention.    Musculoskeletal: Negative for gait problem.   Skin: Negative for color change.   Neurological: Negative for dizziness.   Hematological: Negative for adenopathy.   Psychiatric/Behavioral: Negative for agitation.       Objective:     Temp:  [96 °F (35.6 °C)-100.4 °F (38 °C)] 97.5 °F (36.4 °C)  Pulse:  [] 77  Resp:  [13-29] 20  SpO2:  [95 %-100 %] 99 %  BP: (115-149)/(62-90) 136/76     Body mass index is 30.21 kg/m².    Date 09/18/18 0700 - 09/19/18 0659   Shift 2553-4637 3527-6742 1575-3422 24 Hour Total   INTAKE   P.O. 250   250   Shift Total(mL/kg) 250(2.7)   250(2.7)   OUTPUT   Urine(mL/kg/hr) 725   725   Shift Total(mL/kg) 725(7.8)   725(7.8)   Weight (kg) 92.8 92.8 92.8 92.8          Drains     Drain                 Urethral Catheter 09/17/18 1235 Straight-tip 16 Fr. 1 day                Physical Exam   Vitals reviewed.  Constitutional: He is oriented to person, place, and time. He appears well-developed and well-nourished.   HENT:   Head: Normocephalic and atraumatic.   Eyes: Conjunctivae are normal. Pupils are equal, round, and reactive to light.   Neck: Normal range of motion. Neck supple.   Cardiovascular: Intact distal pulses.    Pulmonary/Chest: Effort normal. No respiratory distress.   Abdominal: Soft. He exhibits no distension. There is no tenderness.   Musculoskeletal: Normal range of motion. He exhibits no edema.   Neurological: He is alert and oriented to person, place, and time.   Skin: Skin is warm and dry.     Psychiatric: He has a normal mood and affect. His behavior is normal.       Significant Labs:    BMP:  Recent Labs   Lab  09/13/18   0916  09/17/18   1222  09/18/18   0336   NA  135*  131*  132*   K  4.5  4.5  3.6   CL  99  95  102   CO2  28  22*  22*   BUN  17  29*  30*   CREATININE  1.62*  1.8*  1.5*   CALCIUM  8.8  8.5*  8.0*       CBC:  Recent Labs   Lab  09/13/18   0916  09/17/18   1222  09/18/18   0336   WBC  13.42*  28.76*  19.32*   HGB  14.0  12.5*  10.8*   HCT  42.1  38.6*   32.5*   PLT  351*  350  347       All pertinent labs results from the past 24 hours have been reviewed.  Recent Lab Results       09/18/18  1106 09/18/18  0653 09/18/18  0336 09/17/18  1635 09/17/18  1529      Anion Gap   8       Baso #   0.02       Basophil%   0.1       BUN, Bld   30       Calcium   8.0       Chloride   102       CO2   22       Creatinine   1.5       Differential Method   Automated       eGFR if    51       eGFR if non    44  Comment:  Calculation used to obtain the estimated glomerular filtration  rate (eGFR) is the CKD-EPI equation.          Eos #   0.2       Eosinophil%   0.8       Glucose   105       Gran # (ANC)   16.1       Gran%   83.2       Hematocrit   32.5       Hemoglobin   10.8       Lactate, Shan    1.7  Comment:  Falsely low lactic acid results can be found in samples   containing >=13.0 mg/dL total bilirubin and/or >=3.5 mg/dL   direct bilirubin.        Lymph #   1.1       Lymph%   5.9       Magnesium   1.7       MCH   28.9       MCHC   33.2       MCV   87       Mono #   1.9       Mono%   9.7       MPV   9.4       Occult Blood     Negative     Phosphorus   2.3       Platelets   347       POCT Glucose 94 95        Potassium   3.6       RBC   3.74       RDW   14.0       Sodium   132       Stool WBC     No neutrophils seen     WBC   19.32                       Significant Imaging:  All pertinent imaging results/findings from the past 24 hours have been reviewed.

## 2018-09-18 NOTE — PLAN OF CARE
TN went to meet with patient, no family at bedside. Patient is independent and lives with his brother and sister-in-law at home. He does not have home health. Patient reports he has a straight cane and rolling walker at home. He does not drive neither his brother. He uses [facility] transportation for doctor's appointments. I informed patient will need to see if family can transport home on discharge, because [facility] usually does not transport home hospital discharges.     Future Appointments   Date Time Provider Department Center   9/21/2018 10:00 AM Ariella Lerner DO [facility]        09/18/18 1231   Discharge Assessment   Assessment Type Discharge Planning Assessment   Confirmed/corrected address and phone number on facesheet? Yes   Assessment information obtained from? Patient   Prior to hospitilization cognitive status: Alert/Oriented   Prior to hospitalization functional status: Assistive Equipment   Current cognitive status: Alert/Oriented   Current Functional Status: Assistive Equipment   Facility Arrived From: HOME  (BROTHER AND SISTER-IN-LAW)   Lives With other (see comments)   Able to Return to Prior Arrangements yes   Is patient able to care for self after discharge? Yes   Who are your caregiver(s) and their phone number(s)? Vishal Mcgill Brother [phone]    Patient's perception of discharge disposition home or selfcare;home health   Readmission Within The Last 30 Days previous discharge plan unsuccessful   If yes, most recent facility name: [facility]   Equipment Currently Used at Home cane, straight;walker, rolling   Does the patient have transportation home? Yes   Transportation Available family or friend will provide   Dialysis Name and Scheduled days N/A   Discharge Plan A Home;Home with family   Discharge Plan B Home   Patient/Family In Agreement With Plan yes   Readmission Questionnaire   At the time of your discharge, did someone talk to you about what your health problems were?  Yes   At the time of discharge, did someone talk to you about what to watch out for regarding worsening of your health problem? Yes   At the time of discharge, did someone talk to you about what to do if you experienced worsening of your health problem? Yes   At the time of discharge, did someone talk to you about which medication to take when you left the hospital and which ones to stop taking? Yes   At the time of discharge, did someone talk to you about when and where to follow up with a doctor after you left the hospital? Yes   Do you have problems taking your medications as prescribed? No   Do you have any problems affording any of  your prescribed medications? No   Do you have problems obtaining/receiving your medications? No   Does the patient have transportation to healthcare appointments? Yes   Living Arrangements house   Does the patient have family/friends to help with healtcare needs after discharge? yes   Does your caregiver provide all the help you need? Yes   If no, what kind of help do you need at home? N/A   Are you currently feeling confused? No   In the last 7 days, my sleep quality was: poor     Caprice Lucas RN  Transition Navigator  [phone]

## 2018-09-18 NOTE — CONSULTS
[facility]  Urology  Consult Note    Patient Name: Jose Roca  MRN: [id number]  Admission Date: 9/17/2018  Hospital Length of Stay: 1   Code Status: Full Code   Attending Provider: Ariella Lerner DO   Consulting Provider: Rossy Herndon MD  Primary Care Physician: Martha Jamison MD  Principal Problem:Sepsis    Inpatient consult to Urology  Consult performed by: Rossy Herndon MD  Consult ordered by: Ariella Lerner DO  Reason for consult: post retention  Assessment/Recommendations: 78 y.o. male with BPH, recent postoperative urinary retention s/p hernia repair    Plan:  1. Counseled patient should have hutton remaining indwelling for now.  2. Continue both flomax and finasteride as an outpatient. I ordered finasteride to be administered while he is an inpatient.   3. Please have patient followup with me in clinic about 7-10 days after discharge for a voiding trial. It is possible that due to his age, comorbidities such as diabetes, he may have a hypocontractile versus a neurogenic bladder. If he fails the voiding trial, it is possible that he may need further testing such as a urodynamics procedure, possible cystoscopy for evaluation of prostatic urethra, etc.           Subjective:     HPI:  78 y.o. male patient of Dr. Lerner with general surgery. He is s/p an open and lap hernia repair on 9/5/18. Postoperatively on day 1 he developed urinary retention and a hutton was inserted which drained 1 liter of urine from his bladder and has been on flomax since that time. Another voiding trial was attempted on POD 3 but was unsuccessful. He was discharged with urology followup with Dr. Lam but was readmitted recently for infection concerns before he could make it to the clinic visit.    He is unsure if he has been taking flomax as an outpatient. He states he was taking medications that he was 'sent out' with and was not exactly sure the name of the medication flomax. He states he never had  issues with retention before this. Has a history of an enlarged prostate but was not taking any BPH meds prior to hernia surgery.    Past Medical History:   Diagnosis Date    Diabetes mellitus, type 2     High cholesterol     History of hepatitis C; S/p RX with SVR 23 documented 07/2017 7/18/2017    Hypertension      Past Surgical History:   Procedure Laterality Date    CHOLECYSTECTOMY      CORONARY STENT PLACEMENT      EXPLORATORY LAPAROTOMY W/ BOWEL RESECTION  09/19/2016    ileocecectomy    EXPLORATORY-LAPAROTOMY N/A 9/19/2016    [facility] OR    ILEOCECECTOMY  9/19/2016    [facility] by [facility] at [facility] OR    LYSIS OF ADHESIONS N/A 9/5/2018    Procedure: LYSIS, ADHESIONS;  Surgeon: Ariella Lerner DO;  [facility];  Service: General;  Laterality: N/A;    LYSIS, ADHESIONS N/A 9/5/2018    [facility]    OMENTECTOMY N/A 9/5/2018    Procedure: OMENTECTOMY;  Surgeon: Ariella Lerner DO;  [facility];  Service: General;  Laterality: N/A;    OMENTECTOMY N/A 9/5/2018    [facility]    REPAIR,HERNIA,INCISIONAL OR VENTRAL,WITHOUT HISTORY OF PRIOR REPAIR(MIDLINE HERNIA DEFECT 6CMx3.6CM) (LEFT LATERAL HERNIA DEFECT [id number]) (HYBRID) N/A 9/5/2018    [facility]     History reviewed. No pertinent family history.  Social History     Tobacco Use    Smoking status: Current Every Day Smoker     Packs/day: 0.50     Types: Cigarettes    Smokeless tobacco: Never Used    Tobacco comment: APPROX 3 CIGARETTES A DAY   Substance Use Topics    Alcohol use: No    Drug use: Yes     Types: Marijuana     Comment: medical     No current facility-administered medications on file prior to encounter.      Current Outpatient Medications on File Prior to Encounter   Medication Sig Dispense Refill    amlodipine (NORVASC) 10 MG tablet Take 1 tablet (10 mg total) by  "mouth once daily. 30 tablet 5    aspirin (ECOTRIN) 325 MG EC tablet Take 325 mg by mouth once daily.      BD INSULIN PEN NEEDLE UF SHORT 31 gauge x 5/16" Ndle       fluticasone (FLONASE) 50 mcg/actuation nasal spray 1 spray (50 mcg total) by Each Nare route once daily. 15 g 0    glimepiride (AMARYL) 4 MG tablet Take 4 mg by mouth once daily.      insulin glargine (LANTUS) 100 unit/mL injection Inject 15 Units into the skin every evening. (Patient taking differently: Inject 30 Units into the skin every evening. ) 5 vial 5    insulin syringe-needle U-100 0.3 mL 30 gauge x 5/16 Syrg       meclizine (ANTIVERT) 25 mg tablet Take 1 tablet (25 mg total) by mouth 3 (three) times daily as needed for Dizziness. 20 tablet 0    oxyCODONE-acetaminophen (PERCOCET) 5-325 mg per tablet Take 1 tablet by mouth every 4 (four) hours as needed for Pain. 30 tablet 0    polyethylene glycol (GLYCOLAX) 17 gram PwPk Take 17 g by mouth once daily. 10 packet 0    senna-docusate 8.6-50 mg (PERICOLACE) 8.6-50 mg per tablet Take 2 tablets by mouth 2 (two) times daily.      simvastatin (ZOCOR) 40 MG tablet Take 1 tablet (40 mg total) by mouth every evening. 30 tablet 5    sitagliptin (JANUVIA) 100 MG Tab Take 1 tablet (100 mg total) by mouth once daily. 30 tablet 5    tamsulosin (FLOMAX) 0.4 mg Cap Take 1 capsule (0.4 mg total) by mouth every evening. 30 capsule 0     Review of patient's allergies indicates:  No Known Allergies      Past Medical History:   Diagnosis Date    Diabetes mellitus, type 2     High cholesterol     History of hepatitis C; S/p RX with SVR 23 documented 07/2017 7/18/2017    Hypertension        Past Surgical History:   Procedure Laterality Date    CHOLECYSTECTOMY      CORONARY STENT PLACEMENT      EXPLORATORY LAPAROTOMY W/ BOWEL RESECTION  09/19/2016    ileocecectomy    EXPLORATORY-LAPAROTOMY N/A 9/19/2016    [facility] OR    ILEOCECECTOMY  9/19/2016    Performed by Romelia WINCHESTER" MD [place] [facility] OR    LYSIS OF ADHESIONS N/A 9/5/2018    Procedure: LYSIS, ADHESIONS;  Surgeon: Ariella Lerner DO;  Location: [facility] OR;  Service: General;  Laterality: N/A;    LYSIS, ADHESIONS N/A 9/5/2018    Performed by [facility] DO at [facility] OR    OMENTECTOMY N/A 9/5/2018    Procedure: OMENTECTOMY;  Surgeon: Ariella Lerner DO;  Location: [facility] OR;  Service: General;  Laterality: N/A;    OMENTECTOMY N/A 9/5/2018    Performed by [facility] DO at [facility] OR    REPAIR,HERNIA,INCISIONAL OR VENTRAL,WITHOUT HISTORY OF PRIOR REPAIR(MIDLINE HERNIA DEFECT 6CMx3.6CM) (LEFT LATERAL HERNIA DEFECT 4.0ETi2UI) (HYBRID) N/A 9/5/2018    Performed by Ariella Lerner DO at [facility] OR       Review of patient's allergies indicates:  No Known Allergies    Family History     None          Tobacco Use    Smoking status: Current Every Day Smoker     Packs/day: 0.50     Types: Cigarettes    Smokeless tobacco: Never Used    Tobacco comment: APPROX 3 CIGARETTES A DAY   Substance and Sexual Activity    Alcohol use: No    Drug use: Yes     Types: Marijuana     Comment: medical    Sexual activity: Not on file       Review of Systems   Constitutional: Negative for activity change.   HENT: Negative for facial swelling.    Eyes: Negative for visual disturbance.   Respiratory: Negative for chest tightness.    Cardiovascular: Negative for chest pain.   Gastrointestinal: Negative for abdominal distention.   Musculoskeletal: Negative for gait problem.   Skin: Negative for color change.   Neurological: Negative for dizziness.   Hematological: Negative for adenopathy.   Psychiatric/Behavioral: Negative for agitation.       Objective:     Temp:  [96 °F (35.6 °C)-100.4 °F (38 °C)] 97.5 °F (36.4 °C)  Pulse:  [] 77  Resp:  [13-29] 20  SpO2:  [95 %-100 %] 99 %  BP: (115-149)/(62-90) 136/76     Body mass index is 30.21 kg/m².    Date 09/18/18 0700 - 09/19/18 0659   Shift 5519-8046 8253-4701 5204-4664 24 Hour  Total   INTAKE   P.O. 250   250   Shift Total(mL/kg) 250(2.7)   250(2.7)   OUTPUT   Urine(mL/kg/hr) 725   725   Shift Total(mL/kg) 725(7.8)   725(7.8)   Weight (kg) 92.8 92.8 92.8 92.8          Drains     Drain                 Urethral Catheter 09/17/18 1235 Straight-tip 16 Fr. 1 day                Physical Exam   Vitals reviewed.  Constitutional: He is oriented to person, place, and time. He appears well-developed and well-nourished.   HENT:   Head: Normocephalic and atraumatic.   Eyes: Conjunctivae are normal. Pupils are equal, round, and reactive to light.   Neck: Normal range of motion. Neck supple.   Cardiovascular: Intact distal pulses.    Pulmonary/Chest: Effort normal. No respiratory distress.   Abdominal: Soft. He exhibits no distension. There is no tenderness.   Musculoskeletal: Normal range of motion. He exhibits no edema.   Neurological: He is alert and oriented to person, place, and time.   Skin: Skin is warm and dry.     Psychiatric: He has a normal mood and affect. His behavior is normal.       Significant Labs:    BMP:  Recent Labs   Lab  09/13/18   0916  09/17/18   1222  09/18/18   0336   NA  135*  131*  132*   K  4.5  4.5  3.6   CL  99  95  102   CO2  28  22*  22*   BUN  17  29*  30*   CREATININE  1.62*  1.8*  1.5*   CALCIUM  8.8  8.5*  8.0*       CBC:  Recent Labs   Lab  09/13/18   0916  09/17/18   1222  09/18/18   0336   WBC  13.42*  28.76*  19.32*   HGB  14.0  12.5*  10.8*   HCT  42.1  38.6*  32.5*   PLT  351*  350  347       All pertinent labs results from the past 24 hours have been reviewed.  Recent Lab Results       09/18/18  1106 09/18/18  0653 09/18/18  0336 09/17/18  1635 09/17/18  1529      Anion Gap   8       Baso #   0.02       Basophil%   0.1       BUN, Bld   30       Calcium   8.0       Chloride   102       CO2   22       Creatinine   1.5       Differential Method   Automated       eGFR if    51       eGFR if non    44  Comment:  Calculation used to  obtain the estimated glomerular filtration  rate (eGFR) is the CKD-EPI equation.          Eos #   0.2       Eosinophil%   0.8       Glucose   105       Gran # (ANC)   16.1       Gran%   83.2       Hematocrit   32.5       Hemoglobin   10.8       Lactate, Shan    1.7  Comment:  Falsely low lactic acid results can be found in samples   containing >=13.0 mg/dL total bilirubin and/or >=3.5 mg/dL   direct bilirubin.        Lymph #   1.1       Lymph%   5.9       Magnesium   1.7       MCH   28.9       MCHC   33.2       MCV   87       Mono #   1.9       Mono%   9.7       MPV   9.4       Occult Blood     Negative     Phosphorus   2.3       Platelets   347       POCT Glucose 94 95        Potassium   3.6       RBC   3.74       RDW   14.0       Sodium   132       Stool WBC     No neutrophils seen     WBC   19.32                       Significant Imaging:  All pertinent imaging results/findings from the past 24 hours have been reviewed.                    Assessment and Plan:     Benign prostatic hyperplasia with urinary retention    78 y.o. male with BPH, recent postoperative urinary retention s/p hernia repair    Plan:  1. Counseled patient should have hutton remaining indwelling for now.  2. Continue both flomax and finasteride as an outpatient. I ordered finasteride to be administered while he is an inpatient.   3. Please have patient followup with me in clinic about 7-10 days after discharge for a voiding trial. It is possible that due to his age, comorbidities such as diabetes, he may have a hypocontractile versus a neurogenic bladder. If he fails the voiding trial, it is possible that he may need further testing such as a urodynamics procedure, possible cystoscopy for evaluation of prostatic urethra, etc.             VTE Risk Mitigation (From admission, onward)        Ordered     enoxaparin injection 40 mg  Daily      09/17/18 1934          Thank you for your consult.      Rossy Herndon MD  Urology  [facility]

## 2018-09-18 NOTE — PLAN OF CARE
Problem: Patient Care Overview  Goal: Plan of Care Review  Outcome: Ongoing (interventions implemented as appropriate)  Patient on RA with sats as documented.  No distress.

## 2018-09-19 PROBLEM — A41.9 SEPSIS: Status: RESOLVED | Noted: 2018-09-17 | Resolved: 2018-09-19

## 2018-09-19 LAB
ANION GAP SERPL CALC-SCNC: 12 MMOL/L
BASOPHILS # BLD AUTO: 0.03 K/UL
BASOPHILS NFR BLD: 0.2 %
BUN SERPL-MCNC: 25 MG/DL
CALCIUM SERPL-MCNC: 8.2 MG/DL
CHLORIDE SERPL-SCNC: 102 MMOL/L
CO2 SERPL-SCNC: 18 MMOL/L
CREAT SERPL-MCNC: 1.2 MG/DL
DIFFERENTIAL METHOD: ABNORMAL
E COLI SXT1 STL QL IA: NEGATIVE
E COLI SXT2 STL QL IA: NEGATIVE
EOSINOPHIL # BLD AUTO: 0.4 K/UL
EOSINOPHIL NFR BLD: 2.3 %
ERYTHROCYTE [DISTWIDTH] IN BLOOD BY AUTOMATED COUNT: 13.9 %
EST. GFR  (AFRICAN AMERICAN): >60 ML/MIN/1.73 M^2
EST. GFR  (NON AFRICAN AMERICAN): 58 ML/MIN/1.73 M^2
GLUCOSE SERPL-MCNC: 143 MG/DL
HCT VFR BLD AUTO: 33.3 %
HGB BLD-MCNC: 11.3 G/DL
LYMPHOCYTES # BLD AUTO: 0.8 K/UL
LYMPHOCYTES NFR BLD: 5.4 %
MAGNESIUM SERPL-MCNC: 1.7 MG/DL
MCH RBC QN AUTO: 29.7 PG
MCHC RBC AUTO-ENTMCNC: 33.9 G/DL
MCV RBC AUTO: 88 FL
MONOCYTES # BLD AUTO: 1.8 K/UL
MONOCYTES NFR BLD: 11.7 %
NEUTROPHILS # BLD AUTO: 12.3 K/UL
NEUTROPHILS NFR BLD: 79.8 %
O+P STL TRI STN: NORMAL
PHOSPHATE SERPL-MCNC: 2.2 MG/DL
PLATELET # BLD AUTO: 369 K/UL
PMV BLD AUTO: 9.4 FL
POCT GLUCOSE: 145 MG/DL (ref 70–110)
POCT GLUCOSE: 177 MG/DL (ref 70–110)
POCT GLUCOSE: 178 MG/DL (ref 70–110)
POCT GLUCOSE: 212 MG/DL (ref 70–110)
POTASSIUM SERPL-SCNC: 3.9 MMOL/L
RBC # BLD AUTO: 3.8 M/UL
SODIUM SERPL-SCNC: 132 MMOL/L
WBC # BLD AUTO: 15.39 K/UL

## 2018-09-19 PROCEDURE — 25000003 PHARM REV CODE 250: Performed by: SURGERY

## 2018-09-19 PROCEDURE — 25000003 PHARM REV CODE 250: Performed by: STUDENT IN AN ORGANIZED HEALTH CARE EDUCATION/TRAINING PROGRAM

## 2018-09-19 PROCEDURE — 84100 ASSAY OF PHOSPHORUS: CPT

## 2018-09-19 PROCEDURE — 94799 UNLISTED PULMONARY SVC/PX: CPT

## 2018-09-19 PROCEDURE — 83735 ASSAY OF MAGNESIUM: CPT

## 2018-09-19 PROCEDURE — 63600175 PHARM REV CODE 636 W HCPCS: Performed by: SURGERY

## 2018-09-19 PROCEDURE — 36415 COLL VENOUS BLD VENIPUNCTURE: CPT

## 2018-09-19 PROCEDURE — 11000001 HC ACUTE MED/SURG PRIVATE ROOM

## 2018-09-19 PROCEDURE — 94761 N-INVAS EAR/PLS OXIMETRY MLT: CPT

## 2018-09-19 PROCEDURE — 25000003 PHARM REV CODE 250: Performed by: EMERGENCY MEDICINE

## 2018-09-19 PROCEDURE — 80048 BASIC METABOLIC PNL TOTAL CA: CPT

## 2018-09-19 PROCEDURE — 85025 COMPLETE CBC W/AUTO DIFF WBC: CPT

## 2018-09-19 PROCEDURE — 99232 SBSQ HOSP IP/OBS MODERATE 35: CPT | Mod: ,,, | Performed by: SURGERY

## 2018-09-19 PROCEDURE — 99900035 HC TECH TIME PER 15 MIN (STAT)

## 2018-09-19 RX ORDER — SODIUM,POTASSIUM PHOSPHATES 280-250MG
2 POWDER IN PACKET (EA) ORAL ONCE
Status: COMPLETED | OUTPATIENT
Start: 2018-09-19 | End: 2018-09-19

## 2018-09-19 RX ADMIN — PANTOPRAZOLE SODIUM 40 MG: 40 TABLET, DELAYED RELEASE ORAL at 08:09

## 2018-09-19 RX ADMIN — ACETAMINOPHEN 650 MG: 325 TABLET, FILM COATED ORAL at 03:09

## 2018-09-19 RX ADMIN — SIMVASTATIN 40 MG: 20 TABLET, FILM COATED ORAL at 08:09

## 2018-09-19 RX ADMIN — INSULIN ASPART 2 UNITS: 100 INJECTION, SOLUTION INTRAVENOUS; SUBCUTANEOUS at 11:09

## 2018-09-19 RX ADMIN — POTASSIUM & SODIUM PHOSPHATES POWDER PACK 280-160-250 MG 2 PACKET: 280-160-250 PACK at 11:09

## 2018-09-19 RX ADMIN — INSULIN DETEMIR 15 UNITS: 100 INJECTION, SOLUTION SUBCUTANEOUS at 08:09

## 2018-09-19 RX ADMIN — ACETAMINOPHEN 650 MG: 325 TABLET, FILM COATED ORAL at 08:09

## 2018-09-19 RX ADMIN — CEFTRIAXONE SODIUM 2 G: 2 INJECTION, POWDER, FOR SOLUTION INTRAMUSCULAR; INTRAVENOUS at 01:09

## 2018-09-19 RX ADMIN — ACETAMINOPHEN 650 MG: 325 TABLET, FILM COATED ORAL at 01:09

## 2018-09-19 RX ADMIN — DIPHENHYDRAMINE HYDROCHLORIDE 25 MG: 25 CAPSULE ORAL at 08:09

## 2018-09-19 RX ADMIN — SODIUM CHLORIDE, SODIUM LACTATE, POTASSIUM CHLORIDE, AND CALCIUM CHLORIDE: .6; .31; .03; .02 INJECTION, SOLUTION INTRAVENOUS at 03:09

## 2018-09-19 RX ADMIN — DIPHENHYDRAMINE HYDROCHLORIDE 25 MG: 25 CAPSULE ORAL at 11:09

## 2018-09-19 RX ADMIN — FINASTERIDE 5 MG: 5 TABLET, FILM COATED ORAL at 08:09

## 2018-09-19 RX ADMIN — AMLODIPINE BESYLATE 10 MG: 5 TABLET ORAL at 08:09

## 2018-09-19 RX ADMIN — SODIUM CHLORIDE, SODIUM LACTATE, POTASSIUM CHLORIDE, AND CALCIUM CHLORIDE: .6; .31; .03; .02 INJECTION, SOLUTION INTRAVENOUS at 01:09

## 2018-09-19 RX ADMIN — DIPHENHYDRAMINE HYDROCHLORIDE 25 MG: 25 CAPSULE ORAL at 03:09

## 2018-09-19 RX ADMIN — ENOXAPARIN SODIUM 40 MG: 100 INJECTION SUBCUTANEOUS at 04:09

## 2018-09-19 RX ADMIN — TAMSULOSIN HYDROCHLORIDE 0.4 MG: 0.4 CAPSULE ORAL at 08:09

## 2018-09-19 NOTE — PT/OT/SLP PROGRESS
Physical Therapy      Patient Name:  Jose Roca   MRN:  [id number]    Patient refused PT treatment c/o generalized weakness. Primary nurse aware. Will follow up as able    Clement Bernal, PT

## 2018-09-19 NOTE — PROGRESS NOTES
[facility]  General Surgery      78 y.o. male with h/o DM, HTN, HLD s/p combo lap/open incisional hernia repairs on 9/5/18 at [facility] and acute urinary retention s/p multiple failed voiding trials likely from BPH. Developed suspected UTI, leukocytosis, diarrhea and malaise.     Interval History: Pt reports minimal improvement in malaise since admit. States he is weak and very tired but has not been able to sleep for the past 5 days. Requested sleeping aid. Denies f/c, n/v, cp, sob, abd pain. States he does not have an appetite for food or drink. Reports still having diarrhea but only one BM noted in chart.      Temp:  [97.1 °F (36.2 °C)-99.5 °F (37.5 °C)] 97.1 °F (36.2 °C)  Pulse:  [74-92] 86  Resp:  [16-20] 17  SpO2:  [96 %-99 %] 99 %  BP: (119-138)/(56-76) 135/74    GEN: NAD, laying in bed, AAOx3  HEENT: Anicteric  CV: RRR  RESP: NLB, no distress  ABD: Soft, NT, ND, well healing incisions w/o erythema or breakdown, no rebound, no guarding  EXT: No edema    I/O last 3 completed shifts:  In: 4251.7 [P.O.:860; I.V.:3341.7; IV Piggyback:50]  Out: 2175 [Urine:2175]    LABS  WBC  19.3 -> 15.4  H/H 11.3/33.3        K 3.9    CO2 18  BUN 12  CR 1.2      PHOS 2.2  MG 1.7    UCX - presumed Pseudomonas species  BCX - NGTD  Stool CX - Pending  C.Diff - Pending    Assessment and Plan  79 y/o BM s/p combo lap and open incisional hernia repair, urinary retention from suspected prostatomegaly and pre-existing conditions requiring indwelling catheter, UTI    UTI with indwelling catheter  - Rocephin started in ER, continue until speciation and sensitivities return on UCx  - Leukocytosis remains but trending down, AF  - Catheter exchanged in ER after Rocephin started    BPH with Urinary Retention  - Urology consulted, added Finasteride in addition Flomax, rec keeping hutton in place  - Outpt f/u arranged for voiding trial  - Will stress importance of making appt to the patient and assist in anyway  possible to assure he makes appt    Diarrhea  - Appears to have subsided since admit  - Awaiting stool studies and C.diff    DM  - POCT glucose, SSI, long acting QHS, diabetic diet    HTN  - Home meds    Hypophosphatemia   - Replace PO    Mike Pantoja Jr

## 2018-09-19 NOTE — PROGRESS NOTES
.Pharmacy New Medication Education    Patient accepted medication education.    Pharmacy educated patient on name and purpose of medications and possible side effects, using the teach-back method.     Current Inpatient Medication Orders   acetaminophen tablet 650 mg   acetaminophen tablet 650 mg   amLODIPine tablet 10 mg   cefTRIAXone (ROCEPHIN) 2 g in dextrose 5 % 50 mL IVPB   diphenhydrAMINE capsule 25 mg   enoxaparin injection 40 mg   finasteride tablet 5 mg   glucagon (human recombinant) injection 1 mg   glucose chewable tablet 16 g   glucose chewable tablet 24 g   influenza (FLUZONE HIGH-DOSE) vaccine 0.5 mL   insulin aspart U-100 pen 0-5 Units   insulin detemir U-100 pen 15 Units   lactated ringers infusion   ondansetron injection 4 mg   pantoprazole EC tablet 40 mg   simvastatin tablet 40 mg   sodium chloride 0.9% flush 5 mL   tamsulosin 24 hr capsule 0.4 mg       Learners of pharmacy medication education included:  Patient    Patient +/- learner response:  Verbalized Understanding, Teachback

## 2018-09-19 NOTE — PLAN OF CARE
Problem: Patient Care Overview  Goal: Plan of Care Review  Outcome: Ongoing (interventions implemented as appropriate)  The pt was able to sleep for several hours after receiving the TYlenol and Benadryl.  He is on telemetry running Normal sinus rhythm with a first degree AV block and a right BBB.  VSS.  He continues to be on IV antibiotics.  Blood glucose levels have been monitored and did not require coverage.   He has had complaints of pain in his hips and insomnia which were relieved with medication. He has had no falls.

## 2018-09-19 NOTE — PLAN OF CARE
"Problem: Patient Care Overview  Goal: Plan of Care Review  Outcome: Ongoing (interventions implemented as appropriate)  Plan of care reviewed with patient. Call light within reach, fall precautions maintained, bed alarm set. Special contact maintained. Stool collection still pending. Umana catheter remains in place for UR. Patient aware. Nurse instructed patient to call if needs assistance. Patient verbalized complete understanding. Telemetry monitor SR-A-fib throughout shift. LR infusing at 100 mL/hr. Accuchecks done achs; w/SSI as needed. PRN tylenol given for pain control. PRN benadryl given for insomnia. Pt stated he "feels better this afternoon after taking the benadryl". NAD noted. Will continue to monitor and continue plan of care.         "

## 2018-09-20 LAB
ANION GAP SERPL CALC-SCNC: 11 MMOL/L
BACTERIA UR CULT: NORMAL
BASOPHILS # BLD AUTO: 0.04 K/UL
BASOPHILS NFR BLD: 0.2 %
BUN SERPL-MCNC: 18 MG/DL
CALCIUM SERPL-MCNC: 8.6 MG/DL
CHLORIDE SERPL-SCNC: 99 MMOL/L
CO2 SERPL-SCNC: 22 MMOL/L
CREAT SERPL-MCNC: 1.3 MG/DL
DIFFERENTIAL METHOD: ABNORMAL
EOSINOPHIL # BLD AUTO: 0.3 K/UL
EOSINOPHIL NFR BLD: 1.5 %
ERYTHROCYTE [DISTWIDTH] IN BLOOD BY AUTOMATED COUNT: 13.7 %
EST. GFR  (AFRICAN AMERICAN): >60 ML/MIN/1.73 M^2
EST. GFR  (NON AFRICAN AMERICAN): 53 ML/MIN/1.73 M^2
GLUCOSE SERPL-MCNC: 113 MG/DL
HCT VFR BLD AUTO: 34.8 %
HGB BLD-MCNC: 11.6 G/DL
LYMPHOCYTES # BLD AUTO: 1 K/UL
LYMPHOCYTES NFR BLD: 5.7 %
MAGNESIUM SERPL-MCNC: 1.8 MG/DL
MCH RBC QN AUTO: 29 PG
MCHC RBC AUTO-ENTMCNC: 33.3 G/DL
MCV RBC AUTO: 87 FL
MONOCYTES # BLD AUTO: 2 K/UL
MONOCYTES NFR BLD: 10.9 %
NEUTROPHILS # BLD AUTO: 14.7 K/UL
NEUTROPHILS NFR BLD: 81.1 %
PHOSPHATE SERPL-MCNC: 2.3 MG/DL
PLATELET # BLD AUTO: 414 K/UL
PMV BLD AUTO: 9.2 FL
POCT GLUCOSE: 115 MG/DL (ref 70–110)
POCT GLUCOSE: 166 MG/DL (ref 70–110)
POCT GLUCOSE: 173 MG/DL (ref 70–110)
POCT GLUCOSE: 179 MG/DL (ref 70–110)
POTASSIUM SERPL-SCNC: 4 MMOL/L
RBC # BLD AUTO: 4 M/UL
SODIUM SERPL-SCNC: 132 MMOL/L
WBC # BLD AUTO: 18.04 K/UL

## 2018-09-20 PROCEDURE — G8979 MOBILITY GOAL STATUS: HCPCS | Mod: CH

## 2018-09-20 PROCEDURE — G8978 MOBILITY CURRENT STATUS: HCPCS | Mod: CJ

## 2018-09-20 PROCEDURE — 36415 COLL VENOUS BLD VENIPUNCTURE: CPT

## 2018-09-20 PROCEDURE — 25000003 PHARM REV CODE 250: Performed by: EMERGENCY MEDICINE

## 2018-09-20 PROCEDURE — 85025 COMPLETE CBC W/AUTO DIFF WBC: CPT

## 2018-09-20 PROCEDURE — 99232 SBSQ HOSP IP/OBS MODERATE 35: CPT | Mod: ,,, | Performed by: SURGERY

## 2018-09-20 PROCEDURE — 63600175 PHARM REV CODE 636 W HCPCS: Performed by: SURGERY

## 2018-09-20 PROCEDURE — 97802 MEDICAL NUTRITION INDIV IN: CPT

## 2018-09-20 PROCEDURE — 97161 PT EVAL LOW COMPLEX 20 MIN: CPT

## 2018-09-20 PROCEDURE — 11000001 HC ACUTE MED/SURG PRIVATE ROOM

## 2018-09-20 PROCEDURE — 80048 BASIC METABOLIC PNL TOTAL CA: CPT

## 2018-09-20 PROCEDURE — 94761 N-INVAS EAR/PLS OXIMETRY MLT: CPT

## 2018-09-20 PROCEDURE — 25000003 PHARM REV CODE 250: Performed by: SURGERY

## 2018-09-20 PROCEDURE — 25000003 PHARM REV CODE 250: Performed by: STUDENT IN AN ORGANIZED HEALTH CARE EDUCATION/TRAINING PROGRAM

## 2018-09-20 PROCEDURE — 94799 UNLISTED PULMONARY SVC/PX: CPT

## 2018-09-20 PROCEDURE — 83735 ASSAY OF MAGNESIUM: CPT

## 2018-09-20 PROCEDURE — 84100 ASSAY OF PHOSPHORUS: CPT

## 2018-09-20 PROCEDURE — 25000242 PHARM REV CODE 250 ALT 637 W/ HCPCS: Performed by: SURGERY

## 2018-09-20 RX ORDER — SODIUM,POTASSIUM PHOSPHATES 280-250MG
2 POWDER IN PACKET (EA) ORAL ONCE
Status: COMPLETED | OUTPATIENT
Start: 2018-09-20 | End: 2018-09-20

## 2018-09-20 RX ORDER — CIPROFLOXACIN 500 MG/1
500 TABLET ORAL EVERY 12 HOURS
Status: DISCONTINUED | OUTPATIENT
Start: 2018-09-20 | End: 2018-09-21

## 2018-09-20 RX ORDER — FLUTICASONE PROPIONATE 50 MCG
2 SPRAY, SUSPENSION (ML) NASAL DAILY
Status: DISCONTINUED | OUTPATIENT
Start: 2018-09-20 | End: 2018-09-24 | Stop reason: HOSPADM

## 2018-09-20 RX ADMIN — ACETAMINOPHEN 650 MG: 325 TABLET ORAL at 04:09

## 2018-09-20 RX ADMIN — TAMSULOSIN HYDROCHLORIDE 0.4 MG: 0.4 CAPSULE ORAL at 08:09

## 2018-09-20 RX ADMIN — PIPERACILLIN AND TAZOBACTAM 4.5 G: 4; .5 INJECTION, POWDER, LYOPHILIZED, FOR SOLUTION INTRAVENOUS; PARENTERAL at 10:09

## 2018-09-20 RX ADMIN — INSULIN DETEMIR 15 UNITS: 100 INJECTION, SOLUTION SUBCUTANEOUS at 10:09

## 2018-09-20 RX ADMIN — PANTOPRAZOLE SODIUM 40 MG: 40 TABLET, DELAYED RELEASE ORAL at 08:09

## 2018-09-20 RX ADMIN — DIPHENHYDRAMINE HYDROCHLORIDE 25 MG: 25 CAPSULE ORAL at 08:09

## 2018-09-20 RX ADMIN — ACETAMINOPHEN 650 MG: 325 TABLET ORAL at 08:09

## 2018-09-20 RX ADMIN — ENOXAPARIN SODIUM 40 MG: 100 INJECTION SUBCUTANEOUS at 04:09

## 2018-09-20 RX ADMIN — LACTOBACILLUS TAB 4 TABLET: TAB at 04:09

## 2018-09-20 RX ADMIN — DIPHENHYDRAMINE HYDROCHLORIDE 25 MG: 25 CAPSULE ORAL at 10:09

## 2018-09-20 RX ADMIN — CIPROFLOXACIN 500 MG: 500 TABLET, FILM COATED ORAL at 06:09

## 2018-09-20 RX ADMIN — DIPHENHYDRAMINE HYDROCHLORIDE 25 MG: 25 CAPSULE ORAL at 04:09

## 2018-09-20 RX ADMIN — FLUTICASONE PROPIONATE 100 MCG: 50 SPRAY, METERED NASAL at 10:09

## 2018-09-20 RX ADMIN — FINASTERIDE 5 MG: 5 TABLET, FILM COATED ORAL at 08:09

## 2018-09-20 RX ADMIN — AMLODIPINE BESYLATE 10 MG: 5 TABLET ORAL at 08:09

## 2018-09-20 RX ADMIN — SODIUM CHLORIDE, SODIUM LACTATE, POTASSIUM CHLORIDE, AND CALCIUM CHLORIDE: .6; .31; .03; .02 INJECTION, SOLUTION INTRAVENOUS at 08:09

## 2018-09-20 RX ADMIN — ACETAMINOPHEN 650 MG: 325 TABLET, FILM COATED ORAL at 10:09

## 2018-09-20 RX ADMIN — SIMVASTATIN 40 MG: 20 TABLET, FILM COATED ORAL at 10:09

## 2018-09-20 RX ADMIN — POTASSIUM & SODIUM PHOSPHATES POWDER PACK 280-160-250 MG 2 PACKET: 280-160-250 PACK at 10:09

## 2018-09-20 NOTE — PROGRESS NOTES
" [facility]  Adult Nutrition  Progress Note    SUMMARY       Recommendations    Recommendation/Intervention:   1. Encourage intake at meals.   2. Encourage diet compliance.   3. Add Boost Glucose Control bid.    Goals:   Pt will consume at least 50-75% intake at meals  Nutrition Goal Status: new  Communication of RD Recs: reviewed with RN(Jillian)    Reason for Assessment  Reason for Assessment: identified at risk by screening criteria([facility])  Diagnosis: infection/sepsis  Relevant Medical History: HTN, DM, Hep C, high cholesterol, expl lap with bowel resection, cholecsystectomy, omentectomy  General Information Comments: Pt on 2000 ADA Cardiac diet.  Pt with decreased appetite. Pt c/o not habing salt on his trays.   Nutrition Discharge Planning: pt to d/c on ADA Cardiac diet    Nutrition Risk Screen  Nutrition Risk Screen: no indicators present    Nutrition/Diet History  Food Preferences: no Mosque or cultural food prefs identified  Do you have any cultural, spiritual, Mosque conflicts, given your current situation?: no  Factors Affecting Nutritional Intake: decreased appetite    Anthropometrics  Temp: 98 °F (36.7 °C)  Height Method: Stated  Height: 5' 9" (175.3 cm)  Height (inches): 69 in  Weight Method: Bed Scale  Weight: 92.8 kg (204 lb 9.4 oz)  Weight (lb): 204.59 lb  Ideal Body Weight (IBW), Male: 160 lb  % Ideal Body Weight, Male (lb): 127.87 lb  BMI (Calculated): 30.3  BMI Grade: 30 - 34.9- obesity - grade I     Lab/Procedures/Meds  Pertinent Labs Reviewed: reviewed  Pertinent Labs Comments: Na 132L, BUN 25H, Glu 143H, Ca 8.2L, Phos 2.2L, Alb 2.7L  Pertinent Medications Reviewed: reviewed  Pertinent Medications Comments: rocephin, insulin    Physical Findings/Assessment  Overall Physical Appearance: overweight  Tubes: (none)  Oral/Mouth Cavity: WDL  Skin: (Samuel 17-intact)    Estimated/Assessed Needs  Weight Used For Calorie Calculations: 92.8 kg (204 lb 9.4 oz)  Energy Calorie " Requirements (kcal): 2054  Energy Need Method: [place](x1.25)  Protein Requirements: 74g (0.8g/kg)  Weight Used For Protein Calculations: 92.8 kg (204 lb 9.4 oz)  Fluid Need Method: RDA Method  RDA Method (mL): 2054     Nutrition Prescription Ordered  Current Diet Order: 2000 ADA Cardiac    Evaluation of Received Nutrient/Fluid Intake  I/O: 918/1475  Energy Calories Required: not meeting needs  Protein Required: not meeting needs  Fluid Required: not meeting needs  Comments: LBM 9/18  % Intake of Estimated Energy Needs: 25 - 50 %  % Meal Intake: 25 - 50 %    Nutrition Risk  Level of Risk/Frequency of Follow-up: (1xweekly)     Assessment and Plan  Nutrition Problem  Inadequate energy intake    Related to (etiology):   Decreased appetite, dislike of hospital foods    Signs and Symptoms (as evidenced by):   Poor intake at meals    Interventions/Recommendations (treatment strategy):  See recs    Nutrition Diagnosis Status:   New     Monitor and Evaluation  Food and Nutrient Intake: food and beverage intake  Food and Nutrient Adminstration: diet order  Physical Activity and Function: nutrition-related ADLs and IADLs  Anthropometric Measurements: weight  Biochemical Data, Medical Tests and Procedures: electrolyte and renal panel  Nutrition-Focused Physical Findings: overall appearance     Nutrition Follow-Up  RD Follow-up?: Yes

## 2018-09-20 NOTE — PLAN OF CARE
Problem: Patient Care Overview  Goal: Plan of Care Review  Outcome: Revised  No distress noted. Pt remains SR with 1st degree AV Block. No BM noted during the night. Tylenol administered as needed. Blood glucose monitored. SSI administered. Umana intact. Fall and Special Contact precautions maintained.

## 2018-09-20 NOTE — PHYSICIAN QUERY
PT Name: Jose Roca  MR #: [id number]  Physician Query Form - Renal Condition Clarification     CDS/: Brandy E Capley               Contact information: Spectralink:  [phone]    This form is a permanent document in the medical record.     QueryDate: September 20, 2018    By submitting this query, we are merely seeking further clarification of documentation. Please utilize your independent clinical judgment when addressing the question(s) below.    The Medical record contains the following:   Indicator Supporting Clinical Findings Location in Medical Record    Kidney (Renal) Insufficiency      Kidney (Renal) Failure / Injury      Nephrotoxic Agents     X BUN/Creatinine GFR BUN, Bld: 29 (H)  Creatinine: 1.8 (H)  eGFR if : 41 (A)    BUN, Bld: 30 (H)  Creatinine: 1.5 (H)  eGFR if : 51 (A)    BUN, Bld: 25 (H)  Creatinine: 1.2  eGFR if African American: >60    BUN, Bld: 18  Creatinine: 1.3  eGFR if : >60   Labs  9/17/2018 12:22        Labs 9/18/2018 03:36        Labs  9/19/2018 06:39        Labs 9/20/2018 03:54   X Urine: Casts         Eosinophils Specimen UA: Urine, Catheterized  Color, UA: Orange (A)  pH, UA: 6.0  Specific Gravity, UA: >=1.030 (A)  Appearance, UA: Cloudy (A)  Protein, UA: 3+ (A)  Glucose, UA: Negative  Ketones, UA: 1+ (A)  Occult Blood UA: 3+ (A)  Nitrite, UA: Positive (A)  Urobilinogen, UA: 1.0  Bilirubin (UA): 2+ (A)  Leukocytes, UA: Trace (A)  RBC, UA: 30 (H)  WBC, UA: 40 (H)  Bacteria, UA: Many (A)  Squam Epithel, UA: 0  Hyaline Casts, UA: 0  Granular Casts, UA: 1 (A)  Microscopic Comment: SEE COMMENT   Labs 9/17/2018 13:28    Dehydration      Nausea/Vomiting      Dialysis/CRRT      Treatment:     X Other:  Reason for Admission: UTI     History of Present Illness:    s/p combo lap/open incisional hernia repairs on 9/5/18 at [facility]. Post op course was complicated by acute urinary retention s/p multiple failed voiding trials. Pt was d/c's home on  9/8/18 on Flomax with outpt Urology consult.    Creat elevated to 1.7 from baseline around 0.7-1.0.    Initial Vitals  BP                  09/17/18 1158      (!) 166/87        H&P 9/17                  ED provider note 9/17   Acute Kidney Injury / Acute Renal Failure has different defining criteria. A generally accepted guideline  is:   A greater than 100% (2X) rise in serum creatinine from baseline* occurring during the course of a single hospital stay.   *Baseline as determined by the providers judgment and consideration of previous lab values and other documentation, if available.    A diagnosis of Acute Kidney Injury/ Acute Renal Failure should incorporate abnormal labs and clinical findings that are clinically significant      References: 1. Joey et al. Acute renal failure-definition, outcome measures, animal models, fluid therapy and information technology needs: the Second International Consensus Conference of the Acute Dialysis Quality Initiative (ADQI) Group. Crit Care 2004; 8:B204; 2. Neymar et al. Acute Kidney Injury Network: report of an initiative to improve outcomes in acute kidney injury. Crit Care 2007; 11:R31; 3. Kidney Disease: Improving Global Outcomes (KDIGO). Acute Kidney Injury Work Group. KDIGO clinical practice guidelines for acute kidney injury. Kidney Int Suppl 2012; 2:1.    The clinical guidelines noted below is only a system guideline, it does not replace the providers clinical judgment.    Provider, please specify the diagnosis or diagnoses associated with above clinical findings.      [    ]  Other Acute Kidney Failure/Injury (please specify): ____________    [ X   ]  Unspecified Acute Kidney Failure/Injury     [    ]  Other (please specify): _______________________________  [    ]  Clinically Undetermined    Please document in your progress notes daily for the duration of treatment until resolved and include in your discharge summary.

## 2018-09-20 NOTE — PLAN OF CARE
Problem: Physical Therapy Goal  Goal: Physical Therapy Goal  Goals to be met by: 10/20/2018     Patient will increase functional independence with mobility by performin. Supine to sit with Modified [place]  2. Sit to stand transfer with Modified [place]  3. Gait  x 150 feet with Modified [place] using Rolling Walker.     Outcome: Ongoing (interventions implemented as appropriate)  Recommend Home with Home Health  No apparent DME needs

## 2018-09-20 NOTE — PROGRESS NOTES
.Pharmacy New Medication Education    Patient accepted medication education.    Pharmacy educated patient on name and purpose of medications and possible side effects, using the teach-back method.     Current Inpatient Medication Orders   acetaminophen tablet 650 mg   acetaminophen tablet 650 mg   amLODIPine tablet 10 mg   diphenhydrAMINE capsule 25 mg   enoxaparin injection 40 mg   finasteride tablet 5 mg   fluticasone 50 mcg/actuation nasal spray 100 mcg   glucagon (human recombinant) injection 1 mg   glucose chewable tablet 16 g   glucose chewable tablet 24 g   influenza (FLUZONE HIGH-DOSE) vaccine 0.5 mL   insulin aspart U-100 pen 0-5 Units   insulin detemir U-100 pen 15 Units   lactated ringers infusion   ondansetron injection 4 mg   pantoprazole EC tablet 40 mg   piperacillin-tazobactam 4.5 g in dextrose 5 % 100 mL IVPB (ready to mix system)   potassium, sodium phosphates 280-160-250 mg packet 2 packet   simvastatin tablet 40 mg   sodium chloride 0.9% flush 5 mL   tamsulosin 24 hr capsule 0.4 mg       Learners of pharmacy medication education included:  Patient    Patient +/- learner response:  Verbalized Understanding, Teachback

## 2018-09-20 NOTE — PROGRESS NOTES
[facility]  General Surgery      78 y.o. male with h/o DM, HTN, HLD s/p combo lap/open incisional hernia repairs on 9/5/18 at [facility] and acute urinary retention s/p multiple failed voiding trials likely from BPH. Developed suspected UTI, leukocytosis, diarrhea and malaise.     Interval History: Pt is feeling better this morning and in good spirits. He still feels weak and tired but was able to get some sleep last night. He is still however refusing to work with PT. Denies f/c, n/v, abd pain. His diarrhea has resolved and he has not had a BM for 2 days. He is still passing plenty of flatus. He is tolerating liquids but does not have much of an appetite therefore he isn't eating much.      Temp:  [97.6 °F (36.4 °C)-99.9 °F (37.7 °C)] 99.7 °F (37.6 °C)  Pulse:  [81-97] 97  Resp:  [14-20] 14  SpO2:  [96 %-99 %] 97 %  BP: (132-176)/(70-85) 171/85    GEN: NAD, laying in bed, AAOx3  HEENT: Anicteric  CV: RRR  RESP: NLB, no distress  ABD: Soft, NT, ND, well healing incisions w/o erythema or breakdown, no rebound, no guarding  EXT: No edema    I/O last 3 completed shifts:  In: 3786.3 [P.O.:670; I.V.:3066.3; IV Piggyback:50]  Out: 2925 [Urine:2925]    LABS  WBC  19.3 -> 15.4 -> 18  H/H 11.6/34.8        K 4.0  CHL 99  CO2 22  BUN 18  CR 1.3      PHOS 2.3  MG 1.8    UCX - presumed Pseudomonas species  BCX - NGTD  Stool CX - Nothing significant to date  C.Diff - canceled    Assessment and Plan  77 y/o BM s/p combo lap and open incisional hernia repair, urinary retention from suspected prostatomegaly and pre-existing conditions requiring indwelling catheter, UTI    UTI with indwelling catheter  - Rocephin started in ER however WBC remains elevated. Given the presumed pseudomonas infection, will change to Zosyn for better coverage, await speciation and sensitivities return on UCx  - Catheter exchanged in ER     BPH with Urinary Retention  - Urology consulted, added Finasteride in addition Flomax, will  keep hutton in place  - Outpt f/u arranged for voiding trial  - Will stress importance of making appt to the patient and assist in anyway possible to assure he makes appt    Diarrhea  - Resolved since admit  - Cancel C.diff and contact precautions  - Will stop IVF today    DM  - POCT glucose, SSI, long acting QHS, diabetic diet    HTN  - Home meds    Hypophosphatemia   - Replace PO    Encourage PT, OOBC, ambulation  Added Flonase per patient request  Onofre REZA [facility]

## 2018-09-20 NOTE — PLAN OF CARE
Problem: Patient Care Overview  Goal: Plan of Care Review  Outcome: Ongoing (interventions implemented as appropriate)  Pt on RA with documented sats.97. Patient achieving 1000 ml on incentive spirometer. Will continue to monitor.

## 2018-09-20 NOTE — PHYSICIAN QUERY
PT Name: Jose Roca  MR #: [id number]    Physician Query Form - Cause and Effect Relationship Clarification      CDS/: Brandy E Capley               Contact information:  Spectralink:  [phone]    This form is a permanent document in the medical record.     Query Date: September 20, 2018    By submitting this query, we are merely seeking further clarification of documentation. Please utilize your independent clinical judgment when addressing the question(s) below.    The Medical record contains the following:  Supporting Clinical Findings   Location in record          Principal Problem:Sepsis     UTI with indwelling catheter                     urology consult 9/18    Surgery progress notes 9/19                                                                           Urine Culture, Routine  PRESUMPTIVE PSEUDOMONAS SPECIES   >100,000 cfu/ml                                                                                                                Urine culture 9/17         Provider, please clarify if there is any correlation between _____sepsis______ and ___Pseudomonas____.           Are the conditions:     [  ] Due to or associated with each other     [  ] Unrelated to each other     [X  ] Other (Please Specify): ___patient has Pseudomonas UTI but does not have sepsis. The sepsis diagnosis was given in the ER and has since been removed from his problem list.______________________     [  ] Clinically Undetermined

## 2018-09-20 NOTE — PLAN OF CARE
Problem: Nutrition, Imbalanced: Inadequate Oral Intake (Adult)  Goal: Improved Oral Intake  Patient will demonstrate the desired outcomes by discharge/transition of care.  Outcome: Ongoing (interventions implemented as appropriate)  Recommendation/Intervention:   1. Encourage intake at meals.   2. Encourage diet compliance.   3. Add Boost Glucose Control bid.    Goals:   Pt will consume at least 50-75% intake at meals  Nutrition Goal Status: new  Communication of RD Recs: reviewed with RN

## 2018-09-20 NOTE — PT/OT/SLP EVAL
Physical Therapy Evaluation    Patient Name:  Jose Roca   MRN:  [id number]    Recommendations:     Discharge Recommendations:  home with home health   Discharge Equipment Recommendations: none   Barriers to discharge: None    Assessment:     Jose Roca is a 78 y.o. male admitted with a medical diagnosis of Urinary tract infection associated with indwelling urethral catheter.  He presents with the following impairments/functional limitations:  weakness, impaired self care skills, impaired functional mobilty, pain, gait instability, impaired balance, impaired skin . Patient with decline in functional mobility.    Rehab Prognosis:  good; patient would benefit from acute skilled PT services to address these deficits and reach maximum level of function.      Recent Surgery: * No surgery found *      Plan:     During this hospitalization, patient to be seen 6 x/week to address the above listed problems via gait training, therapeutic activities, therapeutic exercises  · Plan of Care Expires:  10/20/18   Plan of Care Reviewed with: patient    Subjective     Communicated with primary nurse prior to session.  Patient found supine upon PT entry to room, agreeable to evaluation.      Chief Complaint: pain   Patient comments/goals: go home  Pain/Comfort:  · Pain Rating 1: 2/10  · Location - Side 1: Right  · Location 1: abdomen  · Pain Addressed 1: Reposition, Distraction  · Pain Rating Post-Intervention 1: 3/10    Patients cultural, spiritual, Christianity conflicts given the current situation:      Living Environment:  Lives with family SSH no concerns  Prior to admission, patients level of function was independent.  Patient has the following equipment: walker, rolling, cane, straight.  DME owned (not currently used): none.  Upon discharge, patient will have assistance from family.    Objective:     Patient found with: peripheral IV, central line     General Precautions: Standard, fall   Orthopedic Precautions:N/A    Braces: N/A     Exams:  · RLE ROM: WFL except ankle  · RLE Strength: +3/5 to 4/5  · LLE ROM: WFL except ankle   · LLE Strength: +3/5 to 4/5    Functional Mobility:  · Bed Mobility:     · Supine to Sit: supervision  · Sit to Supine: supervision  · Transfers:     · Sit to Stand:  supervision and stand by assistance with rolling walker  · Gait: Able to take side steps towards head of bed with CG assist c/o foot pain  · Balance: poor+ with RW    AM-PAC 6 CLICK MOBILITY  Total Score:20       Therapeutic Activities and Exercises:   na    Patient left HOB elevated with all lines intact and call button in reach.    GOALS:   Multidisciplinary Problems     Physical Therapy Goals        Problem: Physical Therapy Goal    Goal Priority Disciplines Outcome Goal Variances Interventions   Physical Therapy Goal     PT, PT/OT Ongoing (interventions implemented as appropriate)     Description:  Goals to be met by: 10/20/2018     Patient will increase functional independence with mobility by performin. Supine to sit with Modified [place]  2. Sit to stand transfer with Modified [place]  3. Gait  x 150 feet with Modified [place] using Rolling Walker.                       History:     Past Medical History:   Diagnosis Date    Diabetes mellitus, type 2     High cholesterol     History of hepatitis C; S/p RX with SVR 23 documented 2017    Hypertension        Past Surgical History:   Procedure Laterality Date    CHOLECYSTECTOMY      CORONARY STENT PLACEMENT      EXPLORATORY LAPAROTOMY W/ BOWEL RESECTION  2016    ileocecectomy    EXPLORATORY-LAPAROTOMY N/A 2016    [facility] OR    ILEOCECECTOMY  2016    [facility] at [facility] OR    LYSIS OF ADHESIONS N/A 2018    Procedure: LYSIS, ADHESIONS;  Surgeon: Ariella Lerner DO;  Location: [facility];  Service: General;  Laterality: N/A;    LYSIS, ADHESIONS N/A 2018    Performed by  [facility] OR    OMENTECTOMY N/A 9/5/2018    Procedure: OMENTECTOMY;  Surgeon: Ariella Lerner DO;  Location: [facility] OR;  Service: General;  Laterality: N/A;    OMENTECTOMY N/A 9/5/2018    Performed by [facility]ella Lerner DO at [facility] OR    REPAIR,HERNIA,INCISIONAL OR VENTRAL,WITHOUT HISTORY OF PRIOR REPAIR(MIDLINE HERNIA DEFECT 6CMx3.6CM) (LEFT LATERAL HERNIA DEFECT 4.2UPl4BP) (HYBRID) N/A 9/5/2018    Performed by Ariella Lerner DO at [facility] OR       Clinical Decision Making:     History  Co-morbidities and personal factors that may impact the plan of care Examination  Body Structures and Functions, activity limitations and participation restrictions that may impact the plan of care Clinical Presentation   Decision Making/ Complexity Score   Co-morbidities:   [] Time since onset of injury / illness / exacerbation  [] Status of current condition  []Patient's cognitive status and safety concerns    [] Multiple Medical Problems (see med hx)  Personal Factors:   [] Patient's age  [x] Prior Level of function   [] Patient's home situation (environment and family support)  [x] Patient's level of motivation  [] Expected progression of patient      HISTORY:(criteria)    [] 88533 - no personal factors/history    [x] 40175 - has 1-2 personal factor/comorbidity     [] 54817 - has >3 personal factor/comorbidity     Body Regions:  [] Objective examination findings  [] Head     []  Neck  [] Trunk   [] Upper Extremity  [x] Lower Extremity    Body Systems:  [] For communication ability, affect, cognition, language, and learning style: the assessment of the ability to make needs known, consciousness, orientation (person, place, and time), expected emotional /behavioral responses, and learning preferences (eg, learning barriers, education  needs)  [x] For the neuromuscular system: a general assessment of gross coordinated movement (eg, balance, gait, locomotion, transfers, and transitions) and motor  function  (motor control and motor learning)  [x] For the musculoskeletal system: the assessment of gross symmetry, gross range of motion, gross strength, height, and weight  [] For the integumentary system: the assessment of pliability(texture), presence of scar formation, skin color, and skin integrity  [] For cardiovascular/pulmonary system: the assessment of heart rate, respiratory rate, blood pressure, and edema     Activity limitations:    [] Patient's cognitive status and saf ety concerns          [] Status of current condition      [] Weight bearing restriction  [] Cardiopulmunary Restriction    Participation Restrictions:   [] Goals and goal agreement with the patient     [] Rehab potential (prognosis) and probable outcome      Examination of Body System: (criteria)    [x] 18213 - addressing 1-2 elements    [] 57907 - addressing a total of 3 or more elements     [] 03984 -  Addressing a total of 4 or more elements         Clinical Presentation: (criteria)  Stable - 18043     On examination of body system using standardized tests and measures patient presents with 1-2 elements from any of the following: body structures and functions, activity limitations, and/or participation restrictions.  Leading to a clinical presentation that is considered stable and/or uncomplicated                              Clinical Decision Making  (Eval Complexity):  Low- 14218     Time Tracking:     PT Received On: 09/20/18  PT Start Time: 1045     PT Stop Time: 1105  PT Total Time (min): 20 min     Billable Minutes: Evaluation 20      Clement Bernal, PT  09/20/2018

## 2018-09-20 NOTE — PLAN OF CARE
Problem: Patient Care Overview  Goal: Plan of Care Review  Outcome: Ongoing (interventions implemented as appropriate)  Plan of care reviewed with patient. Call light within reach, fall precautions maintained, bed alarm set. Special contact discontinued due to pt having no BM.  Umana catheter remains in place for UR. Patient aware. Nurse instructed patient to call if needs assistance. Patient verbalized complete understanding. Telemetry monitor SR-A-fib throughout shift. Accuchecks done achs; w/SSI as needed. PRN tylenol given for pain control. PRN benadryl given for insomnia. NAD noted. Will continue to monitor and continue plan of care.

## 2018-09-21 DIAGNOSIS — Z86.19 HISTORY OF HEPATITIS C: Primary | ICD-10-CM

## 2018-09-21 DIAGNOSIS — K74.00 LIVER FIBROSIS: ICD-10-CM

## 2018-09-21 DIAGNOSIS — R16.0 LIVER MASS: ICD-10-CM

## 2018-09-21 LAB
ANION GAP SERPL CALC-SCNC: 10 MMOL/L
BACTERIA STL CULT: NORMAL
BASOPHILS # BLD AUTO: 0.1 K/UL
BASOPHILS NFR BLD: 0.5 %
BUN SERPL-MCNC: 14 MG/DL
CALCIUM SERPL-MCNC: 8.3 MG/DL
CHLORIDE SERPL-SCNC: 101 MMOL/L
CO2 SERPL-SCNC: 20 MMOL/L
CREAT SERPL-MCNC: 1.2 MG/DL
DIFFERENTIAL METHOD: ABNORMAL
EOSINOPHIL # BLD AUTO: 0.5 K/UL
EOSINOPHIL NFR BLD: 2.7 %
ERYTHROCYTE [DISTWIDTH] IN BLOOD BY AUTOMATED COUNT: 13.7 %
EST. GFR  (AFRICAN AMERICAN): >60 ML/MIN/1.73 M^2
EST. GFR  (NON AFRICAN AMERICAN): 58 ML/MIN/1.73 M^2
GLUCOSE SERPL-MCNC: 112 MG/DL
HCT VFR BLD AUTO: 34.3 %
HGB BLD-MCNC: 11.7 G/DL
LYMPHOCYTES # BLD AUTO: 1.1 K/UL
LYMPHOCYTES NFR BLD: 5.7 %
MAGNESIUM SERPL-MCNC: 1.8 MG/DL
MCH RBC QN AUTO: 29.5 PG
MCHC RBC AUTO-ENTMCNC: 34.1 G/DL
MCV RBC AUTO: 87 FL
MONOCYTES # BLD AUTO: 2.1 K/UL
MONOCYTES NFR BLD: 10.8 %
NEUTROPHILS # BLD AUTO: 15.3 K/UL
NEUTROPHILS NFR BLD: 80.3 %
PHOSPHATE SERPL-MCNC: 2.6 MG/DL
PLATELET # BLD AUTO: 368 K/UL
PLATELET BLD QL SMEAR: ABNORMAL
PMV BLD AUTO: 9.3 FL
POCT GLUCOSE: 111 MG/DL (ref 70–110)
POCT GLUCOSE: 127 MG/DL (ref 70–110)
POCT GLUCOSE: 130 MG/DL (ref 70–110)
POCT GLUCOSE: 141 MG/DL (ref 70–110)
POTASSIUM SERPL-SCNC: 4.2 MMOL/L
RBC # BLD AUTO: 3.96 M/UL
SODIUM SERPL-SCNC: 131 MMOL/L
WBC # BLD AUTO: 19.22 K/UL

## 2018-09-21 PROCEDURE — 36415 COLL VENOUS BLD VENIPUNCTURE: CPT

## 2018-09-21 PROCEDURE — 25000003 PHARM REV CODE 250: Performed by: SURGERY

## 2018-09-21 PROCEDURE — 84100 ASSAY OF PHOSPHORUS: CPT

## 2018-09-21 PROCEDURE — 63600175 PHARM REV CODE 636 W HCPCS: Performed by: SURGERY

## 2018-09-21 PROCEDURE — 25000003 PHARM REV CODE 250: Performed by: STUDENT IN AN ORGANIZED HEALTH CARE EDUCATION/TRAINING PROGRAM

## 2018-09-21 PROCEDURE — 25000003 PHARM REV CODE 250: Performed by: EMERGENCY MEDICINE

## 2018-09-21 PROCEDURE — 94761 N-INVAS EAR/PLS OXIMETRY MLT: CPT

## 2018-09-21 PROCEDURE — 97530 THERAPEUTIC ACTIVITIES: CPT

## 2018-09-21 PROCEDURE — 85025 COMPLETE CBC W/AUTO DIFF WBC: CPT

## 2018-09-21 PROCEDURE — 94799 UNLISTED PULMONARY SVC/PX: CPT

## 2018-09-21 PROCEDURE — 99232 SBSQ HOSP IP/OBS MODERATE 35: CPT | Mod: ,,, | Performed by: SURGERY

## 2018-09-21 PROCEDURE — 83735 ASSAY OF MAGNESIUM: CPT

## 2018-09-21 PROCEDURE — 11000001 HC ACUTE MED/SURG PRIVATE ROOM

## 2018-09-21 PROCEDURE — 97110 THERAPEUTIC EXERCISES: CPT

## 2018-09-21 PROCEDURE — 80048 BASIC METABOLIC PNL TOTAL CA: CPT

## 2018-09-21 PROCEDURE — 63600175 PHARM REV CODE 636 W HCPCS: Performed by: EMERGENCY MEDICINE

## 2018-09-21 RX ADMIN — INSULIN DETEMIR 15 UNITS: 100 INJECTION, SOLUTION SUBCUTANEOUS at 09:09

## 2018-09-21 RX ADMIN — FLUTICASONE PROPIONATE 100 MCG: 50 SPRAY, METERED NASAL at 09:09

## 2018-09-21 RX ADMIN — LACTOBACILLUS TAB 4 TABLET: TAB at 08:09

## 2018-09-21 RX ADMIN — LACTOBACILLUS TAB 4 TABLET: TAB at 11:09

## 2018-09-21 RX ADMIN — DIPHENHYDRAMINE HYDROCHLORIDE 25 MG: 25 CAPSULE ORAL at 10:09

## 2018-09-21 RX ADMIN — PIPERACILLIN AND TAZOBACTAM 4.5 G: 4; .5 INJECTION, POWDER, LYOPHILIZED, FOR SOLUTION INTRAVENOUS; PARENTERAL at 10:09

## 2018-09-21 RX ADMIN — DIPHENHYDRAMINE HYDROCHLORIDE 25 MG: 25 CAPSULE ORAL at 09:09

## 2018-09-21 RX ADMIN — TAMSULOSIN HYDROCHLORIDE 0.4 MG: 0.4 CAPSULE ORAL at 08:09

## 2018-09-21 RX ADMIN — CIPROFLOXACIN 500 MG: 500 TABLET, FILM COATED ORAL at 08:09

## 2018-09-21 RX ADMIN — AMLODIPINE BESYLATE 10 MG: 5 TABLET ORAL at 08:09

## 2018-09-21 RX ADMIN — ONDANSETRON 4 MG: 2 INJECTION INTRAMUSCULAR; INTRAVENOUS at 04:09

## 2018-09-21 RX ADMIN — PIPERACILLIN AND TAZOBACTAM 4.5 G: 4; .5 INJECTION, POWDER, LYOPHILIZED, FOR SOLUTION INTRAVENOUS; PARENTERAL at 05:09

## 2018-09-21 RX ADMIN — ENOXAPARIN SODIUM 40 MG: 100 INJECTION SUBCUTANEOUS at 04:09

## 2018-09-21 RX ADMIN — PANTOPRAZOLE SODIUM 40 MG: 40 TABLET, DELAYED RELEASE ORAL at 08:09

## 2018-09-21 RX ADMIN — SIMVASTATIN 40 MG: 20 TABLET, FILM COATED ORAL at 09:09

## 2018-09-21 RX ADMIN — FINASTERIDE 5 MG: 5 TABLET, FILM COATED ORAL at 08:09

## 2018-09-21 RX ADMIN — LACTOBACILLUS TAB 4 TABLET: TAB at 04:09

## 2018-09-21 NOTE — PLAN OF CARE
"TN went to meet with patient, no family at bedside. TN reviewed patient's clinicals. He reported he was "not feeling good at all." Therapy notes recommend home health on discharge. I did ask patient if he would be amendable to a [facility] follow-up on discharge. Patient reported he has "transportation issues," so is not sure if he would be able to attend the follow-ups. He would let TN know if he is agreeable. Patient uses [facility] for appointments. TN will continue to follow.    --1526--TN spoke with MD. He stated patient may discharge over the weekend. Follow-ups scheduled with PCP and Urology. TN also sent [facility] home health orders.     1617--TN faxed home health orders again to [facility], did not originally attach in [facility] care.    Future Appointments   Date Time Provider Department Center   10/1/2018  1:20 PM Rossy Herndon MD [facility] UROLOGY [facility]     Follow-up With  Details  Why  Contact Info   Martha Jamison MD  On 9/27/2018  at 3:00 pm, Primary Care Physician  [address]  [address]  [facility] CARE  [facility] [address]  [phone]   Rossy Herndon MD  On 10/1/2018  1:20 pm--Urology Follow-Up  [address]  [address]  [facility] [address]  [phone]   [facility] [email or web address]  [address]  [address]  [place] [address]  [phone]      09/21/18 0944   Discharge Reassessment   Assessment Type Discharge Planning Reassessment   Provided patient/caregiver education on the expected discharge date and the discharge plan Yes   Discharge Plan A Home with family;Home Health   Discharge Plan B Home   Can the patient answer the patient profile reliably? Yes, cognitively intact     Caprice Lucas RN  Transition Navigator  [phone]  "

## 2018-09-21 NOTE — PLAN OF CARE
Problem: Patient Care Overview  Goal: Plan of Care Review  Patient lying in bed in NAD. Bed in low and locked position. Afib on telemetry. Umana in place and draining. Secured to upper thigh. Pt not eating much today. Will encourage po intake. No complaints of pain. Safety maintained. Will continue to monitor.

## 2018-09-21 NOTE — PT/OT/SLP PROGRESS
"Physical Therapy Treatment    Patient Name:  Jose Roca   MRN:  [id number]    Recommendations:     Discharge Recommendations:  home with home health   Discharge Equipment Recommendations: none   Barriers to discharge: None    Assessment:     Jose Roca is a 78 y.o. male admitted with a medical diagnosis of Urinary tract infection associated with indwelling urethral catheter.  He presents with the following impairments/functional limitations:  weakness, impaired endurance, impaired functional mobilty, impaired balance, impaired skin, decreased lower extremity function.  Pt education on the benefits of participating in P.T. Secondary to patient pt requiring encouragement to participate.  Pt sat EOB x 12 mins with S/Mod I, but declined gait secondary to left sided abdominal pain, but did not rate pain level.  Pt stated, "I will walk tomorrow with you."  Pt would continue to benefit from P.T. To address impairments listed above.    Rehab Prognosis:  good; patient would benefit from acute skilled PT services to address these deficits and reach maximum level of function.      Recent Surgery: * No surgery found *      Plan:     During this hospitalization, patient to be seen 6 x/week to address the above listed problems via gait training, therapeutic activities, therapeutic exercises  · Plan of Care Expires:  10/20/18   Plan of Care Reviewed with: patient    Subjective     Communicated with RN (Ольга) prior to session.  Patient found left sidelying upon PT entry to room, agreeable to treatment.      Patient comments/goals: Pt agreed to tx.  Pain/Comfort:  · Pain Rating 1: (left abdominal pain, but did not rate)  · Location - Side 1: Left  · Location 1: abdomen  · Pain Addressed 1: Reposition, Distraction, Nurse notified  · Pain Rating Post-Intervention 1: (as above)    Patients cultural, spiritual, [employer] conflicts given the current situation:      Objective:     Patient found with: telemetry, central line "     General Precautions: Standard, fall   Orthopedic Precautions:N/A   Braces:       Functional Mobility:  · Bed Mobility:     · Rolling Left:  modified independence and supervision  · Rolling Right: modified independence and supervision  · Scooting: stand by assistance and to EOB and supine using BLE to push up to HOB  · Supine to Sit: supervision  · Sit to Supine: supervision  · Transfers:     · Sit to Stand:  stand by assistance and contact guard assistance with rolling walker  · Balance: sitting good, standing fair+      AM-PAC 6 CLICK MOBILITY  Turning over in bed (including adjusting bedclothes, sheets and blankets)?: 4  Sitting down on and standing up from a chair with arms (e.g., wheelchair, bedside commode, etc.): 3  Moving from lying on back to sitting on the side of the bed?: 3  Moving to and from a bed to a chair (including a wheelchair)?: 3  Need to walk in hospital room?: 3  Climbing 3-5 steps with a railing?: 3  Basic Mobility Total Score: 19       Therapeutic Activities and Exercises:   Supine BLE therex: AP, heelslides, hip ABD/ADD/IR/ER, SAQ QS x 15 reps with CGA/Chrissy and vc's for proper technique.  Sitting BLE therex: LAQs and hip flexion x 10 reps with vc's for proper technique.  Static standing at EOB x ~1min with Rw and SBA.  Pt declined gait.    Patient left supine with all lines intact, call button in reach, bed alarm on and RN notified..    GOALS:   Multidisciplinary Problems     Physical Therapy Goals        Problem: Physical Therapy Goal    Goal Priority Disciplines Outcome Goal Variances Interventions   Physical Therapy Goal     PT, PT/OT Ongoing (interventions implemented as appropriate)     Description:  Goals to be met by: 10/20/2018     Patient will increase functional independence with mobility by performin. Supine to sit with Modified [place]  2. Sit to stand transfer with Modified [place]  3. Gait  x 150 feet with Modified [place] using Rolling Walker.                        Time Tracking:     PT Received On: 09/21/18  PT Start Time: 1551     PT Stop Time: 1614  PT Total Time (min): 23 min     Billable Minutes: Therapeutic Activity 12 and Therapeutic Exercise 11    Treatment Type: Treatment  PT/PTA: PTA     PTA Visit Number: 2     Cristina Martinez PTA  09/21/2018

## 2018-09-21 NOTE — PROGRESS NOTES
.Pharmacy New Medication Education    Patient accepted medication education.    Pharmacy educated patient on name and purpose of medications and possible side effects, using the teach-back method.     Current Inpatient Medication Orders   acetaminophen tablet 650 mg   acetaminophen tablet 650 mg   amLODIPine tablet 10 mg   ciprofloxacin HCl tablet 500 mg   diphenhydrAMINE capsule 25 mg   enoxaparin injection 40 mg   finasteride tablet 5 mg   fluticasone 50 mcg/actuation nasal spray 100 mcg   glucagon (human recombinant) injection 1 mg   glucose chewable tablet 16 g   glucose chewable tablet 24 g   influenza (FLUZONE HIGH-DOSE) vaccine 0.5 mL   insulin aspart U-100 pen 0-5 Units   insulin detemir U-100 pen 15 Units   Lactobacillus acidoph-L.bulgar 1 million cell tablet 4 tablet   ondansetron injection 4 mg   pantoprazole EC tablet 40 mg   simvastatin tablet 40 mg   sodium chloride 0.9% flush 5 mL   tamsulosin 24 hr capsule 0.4 mg       Learners of pharmacy medication education included:  Patient    Patient +/- learner response:  Verbalized Understanding, Teachback

## 2018-09-21 NOTE — PLAN OF CARE
Problem: Physical Therapy Goal  Goal: Physical Therapy Goal  Goals to be met by: 10/20/2018     Patient will increase functional independence with mobility by performin. Supine to sit with Modified [place]  2. Sit to stand transfer with Modified [place]  3. Gait  x 150 feet with Modified [place] using Rolling Walker.      Outcome: Ongoing (interventions implemented as appropriate)  Continue working toward goals.

## 2018-09-21 NOTE — PROGRESS NOTES
[facility]  General Surgery    CC: UTI    78 y.o. male with h/o DM, HTN, HLD s/p combo lap/open incisional hernia repairs on 9/5/18 at [facility] and acute urinary retention s/p multiple failed voiding trials likely from BPH. Developed suspected UTI, leukocytosis, diarrhea and malaise.     Interval History:  Despite feeling better yesterday the patient reports last night and today he is feeling worse.  He still feels weak and tired.  He states he is able to stand with and ambulate with a walker with physical therapy but still feels weak.  Umana catheter remains in place.  He has not passed a bowel movement but is continuing to pass flatus.  He did have an episode of nausea and emesis early this morning.  It is not certain what led to this but currently he is asymptomatic.  He slept a little better with the assistance of Benadryl.  He is tolerating plenty of water but it does not have an appetite.    Patient denies fevers, chills, current nausea or vomiting, chest pain, shortness of breath, diarrhea      Temp:  [97.7 °F (36.5 °C)-99.1 °F (37.3 °C)] 97.7 °F (36.5 °C)  Pulse:  [72-89] 75  Resp:  [16-21] 16  SpO2:  [96 %-98 %] 96 %  BP: (113-137)/(67-82) 125/73    GEN: NAD, laying in bed, AAOx3  HEENT: Anicteric  CV: RRR  RESP: NLB, no distress, no wheezes  ABD: Soft, NT, ND, well healed incisions w/o erythema or breakdown, no rebound, no guarding  EXT: No edema    I/O last 3 completed shifts:  In: 1200 [P.O.:1100; IV Piggyback:100]  Out: 1700 [Urine:1700]    LABS  WBC  19.3 -> 15.4 -> 18 -> 19.2  H/H 11.7/34.3        K 4.2    CO2 20  BUN 14  CR 1.2      PHOS 2.6  MG 1.8    UCX - PSEUDOMONAS AERUGINOSA - pan sensitive  BCX - NGTD  Stool CX - Nothing significant to date  C.Diff - canceled    Assessment and Plan  77 y/o BM s/p combo lap and open incisional hernia repair, urinary retention from suspected prostatomegaly and pre-existing conditions requiring indwelling catheter, UTI    UTI  with indwelling catheter  - Rocephin started in ER however WBC remaind elevated. Pseudomonas species was found to be pansensitive however Rocephin was not included.  Changed to Zosyn IV x1 dose then switched to Cipro p.o.  - Due to nausea, uncertainty of absorption and increasing white blood cell count we will switch the patient back to IV Zosyn.    - Catheter exchanged in ER     BPH with Urinary Retention  - Urology consulted, added Finasteride in addition Flomax, will keep hutton in place  - Outpt f/u arranged for voiding trial  - Will stress importance of making appt to the patient and assist in anyway possible to assure he makes appt    Diarrhea  - Resolved since admit  - Cancel C.diff and contact precautions    DM  - POCT glucose, SSI, long acting QHS, diabetic diet    HTN  - Home meds    Hypophosphatemia   - Replace PO as needed    Encourage PT, OOBC, ambulation - home health consult placed for outpatient physical therapy.  Added Flonase per patient request  Onofre Pantoja Jr

## 2018-09-21 NOTE — PLAN OF CARE
met with patient at bedside. Patient was eating lunch. Left contact info on white board. Patient stated he wanted to rest. Case Management will continue to follow.

## 2018-09-22 PROBLEM — E87.5 HYPERKALEMIA: Status: ACTIVE | Noted: 2018-09-22

## 2018-09-22 PROBLEM — K59.09 OTHER CONSTIPATION: Status: ACTIVE | Noted: 2018-09-22

## 2018-09-22 LAB
ANION GAP SERPL CALC-SCNC: 11 MMOL/L
BACTERIA BLD CULT: NORMAL
BACTERIA BLD CULT: NORMAL
BASOPHILS # BLD AUTO: 0.07 K/UL
BASOPHILS NFR BLD: 0.4 %
BUN SERPL-MCNC: 16 MG/DL
CALCIUM SERPL-MCNC: 8.1 MG/DL
CHLORIDE SERPL-SCNC: 101 MMOL/L
CO2 SERPL-SCNC: 17 MMOL/L
CREAT SERPL-MCNC: 1.3 MG/DL
DIFFERENTIAL METHOD: ABNORMAL
EOSINOPHIL # BLD AUTO: 1.1 K/UL
EOSINOPHIL NFR BLD: 6.5 %
ERYTHROCYTE [DISTWIDTH] IN BLOOD BY AUTOMATED COUNT: 14 %
EST. GFR  (AFRICAN AMERICAN): >60 ML/MIN/1.73 M^2
EST. GFR  (NON AFRICAN AMERICAN): 53 ML/MIN/1.73 M^2
GLUCOSE SERPL-MCNC: 114 MG/DL
HCT VFR BLD AUTO: 34.3 %
HGB BLD-MCNC: 11.3 G/DL
LYMPHOCYTES # BLD AUTO: 1.4 K/UL
LYMPHOCYTES NFR BLD: 8.4 %
MAGNESIUM SERPL-MCNC: 2.1 MG/DL
MCH RBC QN AUTO: 28.7 PG
MCHC RBC AUTO-ENTMCNC: 32.9 G/DL
MCV RBC AUTO: 87 FL
MONOCYTES # BLD AUTO: 1.7 K/UL
MONOCYTES NFR BLD: 10.5 %
NEUTROPHILS # BLD AUTO: 12.1 K/UL
NEUTROPHILS NFR BLD: 74.2 %
PHOSPHATE SERPL-MCNC: 3 MG/DL
PLATELET # BLD AUTO: 425 K/UL
PMV BLD AUTO: 9.1 FL
POCT GLUCOSE: 124 MG/DL (ref 70–110)
POCT GLUCOSE: 151 MG/DL (ref 70–110)
POCT GLUCOSE: 151 MG/DL (ref 70–110)
POCT GLUCOSE: 175 MG/DL (ref 70–110)
POTASSIUM SERPL-SCNC: 4.6 MMOL/L
POTASSIUM SERPL-SCNC: 5.5 MMOL/L
RBC # BLD AUTO: 3.94 M/UL
SODIUM SERPL-SCNC: 129 MMOL/L
WBC # BLD AUTO: 16.51 K/UL

## 2018-09-22 PROCEDURE — 85025 COMPLETE CBC W/AUTO DIFF WBC: CPT

## 2018-09-22 PROCEDURE — 80048 BASIC METABOLIC PNL TOTAL CA: CPT

## 2018-09-22 PROCEDURE — 83735 ASSAY OF MAGNESIUM: CPT

## 2018-09-22 PROCEDURE — 84132 ASSAY OF SERUM POTASSIUM: CPT

## 2018-09-22 PROCEDURE — 99232 SBSQ HOSP IP/OBS MODERATE 35: CPT | Mod: 24,,, | Performed by: SURGERY

## 2018-09-22 PROCEDURE — 25000003 PHARM REV CODE 250: Performed by: SURGERY

## 2018-09-22 PROCEDURE — 94799 UNLISTED PULMONARY SVC/PX: CPT

## 2018-09-22 PROCEDURE — 25000003 PHARM REV CODE 250: Performed by: STUDENT IN AN ORGANIZED HEALTH CARE EDUCATION/TRAINING PROGRAM

## 2018-09-22 PROCEDURE — 25000003 PHARM REV CODE 250: Performed by: EMERGENCY MEDICINE

## 2018-09-22 PROCEDURE — 84100 ASSAY OF PHOSPHORUS: CPT

## 2018-09-22 PROCEDURE — 11000001 HC ACUTE MED/SURG PRIVATE ROOM

## 2018-09-22 PROCEDURE — 94761 N-INVAS EAR/PLS OXIMETRY MLT: CPT

## 2018-09-22 PROCEDURE — 36415 COLL VENOUS BLD VENIPUNCTURE: CPT

## 2018-09-22 PROCEDURE — 63600175 PHARM REV CODE 636 W HCPCS: Performed by: SURGERY

## 2018-09-22 RX ORDER — SYRING-NEEDL,DISP,INSUL,0.3 ML 29 G X1/2"
296 SYRINGE, EMPTY DISPOSABLE MISCELLANEOUS EVERY 4 HOURS
Status: DISPENSED | OUTPATIENT
Start: 2018-09-22 | End: 2018-09-22

## 2018-09-22 RX ORDER — POLYETHYLENE GLYCOL 3350 17 G/17G
17 POWDER, FOR SOLUTION ORAL DAILY
Status: DISCONTINUED | OUTPATIENT
Start: 2018-09-22 | End: 2018-09-24 | Stop reason: HOSPADM

## 2018-09-22 RX ORDER — AMOXICILLIN 250 MG
2 CAPSULE ORAL 2 TIMES DAILY
Status: DISCONTINUED | OUTPATIENT
Start: 2018-09-22 | End: 2018-09-24 | Stop reason: HOSPADM

## 2018-09-22 RX ADMIN — STANDARDIZED SENNA CONCENTRATE AND DOCUSATE SODIUM 2 TABLET: 8.6; 5 TABLET, FILM COATED ORAL at 08:09

## 2018-09-22 RX ADMIN — LACTOBACILLUS TAB 4 TABLET: TAB at 09:09

## 2018-09-22 RX ADMIN — POLYETHYLENE GLYCOL 3350 17 G: 17 POWDER, FOR SOLUTION ORAL at 11:09

## 2018-09-22 RX ADMIN — Medication 296 ML: at 11:09

## 2018-09-22 RX ADMIN — STANDARDIZED SENNA CONCENTRATE AND DOCUSATE SODIUM 2 TABLET: 8.6; 5 TABLET, FILM COATED ORAL at 11:09

## 2018-09-22 RX ADMIN — AMLODIPINE BESYLATE 10 MG: 5 TABLET ORAL at 09:09

## 2018-09-22 RX ADMIN — FINASTERIDE 5 MG: 5 TABLET, FILM COATED ORAL at 09:09

## 2018-09-22 RX ADMIN — INSULIN DETEMIR 15 UNITS: 100 INJECTION, SOLUTION SUBCUTANEOUS at 08:09

## 2018-09-22 RX ADMIN — LACTOBACILLUS TAB 4 TABLET: TAB at 11:09

## 2018-09-22 RX ADMIN — PIPERACILLIN AND TAZOBACTAM 4.5 G: 4; .5 INJECTION, POWDER, LYOPHILIZED, FOR SOLUTION INTRAVENOUS; PARENTERAL at 05:09

## 2018-09-22 RX ADMIN — ENOXAPARIN SODIUM 40 MG: 100 INJECTION SUBCUTANEOUS at 05:09

## 2018-09-22 RX ADMIN — DIPHENHYDRAMINE HYDROCHLORIDE 25 MG: 25 CAPSULE ORAL at 02:09

## 2018-09-22 RX ADMIN — SIMVASTATIN 40 MG: 20 TABLET, FILM COATED ORAL at 08:09

## 2018-09-22 RX ADMIN — DIPHENHYDRAMINE HYDROCHLORIDE 25 MG: 25 CAPSULE ORAL at 08:09

## 2018-09-22 RX ADMIN — DIPHENHYDRAMINE HYDROCHLORIDE 25 MG: 25 CAPSULE ORAL at 03:09

## 2018-09-22 RX ADMIN — PIPERACILLIN AND TAZOBACTAM 4.5 G: 4; .5 INJECTION, POWDER, LYOPHILIZED, FOR SOLUTION INTRAVENOUS; PARENTERAL at 09:09

## 2018-09-22 RX ADMIN — FLUTICASONE PROPIONATE 100 MCG: 50 SPRAY, METERED NASAL at 09:09

## 2018-09-22 RX ADMIN — TAMSULOSIN HYDROCHLORIDE 0.4 MG: 0.4 CAPSULE ORAL at 09:09

## 2018-09-22 RX ADMIN — LACTOBACILLUS TAB 4 TABLET: TAB at 05:09

## 2018-09-22 RX ADMIN — PIPERACILLIN AND TAZOBACTAM 4.5 G: 4; .5 INJECTION, POWDER, LYOPHILIZED, FOR SOLUTION INTRAVENOUS; PARENTERAL at 01:09

## 2018-09-22 RX ADMIN — PANTOPRAZOLE SODIUM 40 MG: 40 TABLET, DELAYED RELEASE ORAL at 09:09

## 2018-09-22 NOTE — PROGRESS NOTES
[facility]  General Surgery  Progress Note    Subjective:     History of Present Illness:  No notes on file    Post-Op Info:  * No surgery found *         Interval History: Pt states he has not had a BM since admission 7/19/2018. No n/v presentsly but had episode yesterday. No f/c. Working with PT. Zosyn restarted yesterday due to rising WBC and concerns of absorption of PO cipro. Pt reports he is not ready for discharge    Medications:  Continuous Infusions:  Scheduled Meds:   amLODIPine  10 mg Oral Daily    enoxaparin  40 mg Subcutaneous Daily    finasteride  5 mg Oral Daily    fluticasone  2 spray Each Nare Daily    insulin detemir U-100  15 Units Subcutaneous QHS    Lactobacillus acidoph-L.bulgar  4 tablet Oral TID WM    pantoprazole  40 mg Oral Daily    piperacillin-tazobactam (ZOSYN) IVPB  4.5 g Intravenous Q8H    simvastatin  40 mg Oral QHS    tamsulosin  0.4 mg Oral Daily     PRN Meds:acetaminophen, acetaminophen, diphenhydrAMINE, glucagon (human recombinant), glucose, glucose, influenza, insulin aspart U-100, ondansetron, sodium chloride 0.9%     Review of patient's allergies indicates:  No Known Allergies  Objective:     Vital Signs (Most Recent):  Temp: 98 °F (36.7 °C) (09/22/18 0733)  Pulse: 79 (09/22/18 0812)  Resp: 18 (09/22/18 0812)  BP: 122/65 (09/22/18 0733)  SpO2: 98 % (09/22/18 0812) Vital Signs (24h Range):  Temp:  [97.5 °F (36.4 °C)-99.6 °F (37.6 °C)] 98 °F (36.7 °C)  Pulse:  [70-91] 79  Resp:  [16-22] 18  SpO2:  [95 %-98 %] 98 %  BP: (122-136)/(65-73) 122/65     Weight: 92.8 kg (204 lb 9.4 oz)  Body mass index is 30.21 kg/m².    Intake/Output - Last 3 Shifts       09/20 0700 - 09/21 0659 09/21 0700 - 09/22 0659 09/22 0700 - 09/23 0659    P.O. 1100 180     I.V. (mL/kg)       IV Piggyback 100 100     Total Intake(mL/kg) 1200 (12.9) 280 (3)     Urine (mL/kg/hr) 950 (0.4) 2225 (1)     Total Output 950 2225     Net +250 -1945                  Physical Exam    Constitutional: He is oriented to person, place, and time. He appears well-developed and well-nourished. No distress.   HENT:   Head: Normocephalic and atraumatic.   Eyes: Conjunctivae and EOM are normal. Pupils are equal, round, and reactive to light. No scleral icterus.   Neck: Normal range of motion. Neck supple. No JVD present.   Cardiovascular: Normal rate and regular rhythm.   Pulmonary/Chest: Effort normal and breath sounds normal. No respiratory distress.   Abdominal: Soft. He exhibits no distension. There is no tenderness.   Incision c/d/i, decreased BS   Musculoskeletal: Normal range of motion. He exhibits no edema or tenderness.   Neurological: He is alert and oriented to person, place, and time. No cranial nerve deficit.   Skin: Skin is warm and dry. He is not diaphoretic.   Psychiatric: He has a normal mood and affect.       Significant Labs:  CBC:   Recent Labs   Lab  09/22/18   0542   WBC  16.51*   RBC  3.94*   HGB  11.3*   HCT  34.3*   PLT  425*   MCV  87   MCH  28.7   MCHC  32.9     CMP:   Recent Labs   Lab  09/17/18   1222   09/22/18   0343   GLU  193*   < >  114*   CALCIUM  8.5*   < >  8.1*   ALBUMIN  2.7*   --    --    PROT  6.4   --    --    NA  131*   < >  129*   K  4.5   < >  5.5*   CO2  22*   < >  17*   CL  95   < >  101   BUN  29*   < >  16   CREATININE  1.8*   < >  1.3   ALKPHOS  78   --    --    ALT  13   --    --    AST  19   --    --    BILITOT  1.7*   --    --     < > = values in this interval not displayed.     Microbiology Results (last 7 days)     Procedure Component Value Units Date/Time    Blood culture x two cultures. Draw prior to antibiotics. [[id number]] Collected:  09/17/18 1219    Order Status:  Completed Specimen:  Blood from Peripheral, Hand, Right Updated:  09/21/18 2212     Blood Culture, Routine No Growth to date     Blood Culture, Routine No Growth to date     Blood Culture, Routine No Growth to date     Blood Culture, Routine No Growth to date     Blood Culture,  Routine No Growth to date    Narrative:       Aerobic and anaerobic    Blood culture x two cultures. Draw prior to antibiotics. [[id number]] Collected:  09/17/18 1222    Order Status:  Completed Specimen:  Blood from Peripheral, Antecubital, Left Updated:  09/21/18 2212     Blood Culture, Routine No Growth to date     Blood Culture, Routine No Growth to date     Blood Culture, Routine No Growth to date     Blood Culture, Routine No Growth to date     Blood Culture, Routine No Growth to date    Narrative:       Aerobic and anaerobic    Stool culture **CANNOT BE ORDERED STAT** [[id number]] Collected:  09/17/18 1529    Order Status:  Completed Specimen:  Stool Updated:  09/21/18 1429     Stool Culture No Salmonella,Shigella,Vibrio,Campylobacter,Yersinia isolated.    Urine culture [[id number]]  (Susceptibility) Collected:  09/17/18 1328    Order Status:  Completed Specimen:  Urine Updated:  09/20/18 1237     Urine Culture, Routine --     PSEUDOMONAS AERUGINOSA  >100,000 cfu/ml  No other significant isolate      Narrative:       Preferred Collection Type->Urine, Clean Catch    E. coli 0157 antigen [[id number]] Collected:  09/17/18 1529    Order Status:  Completed Specimen:  Stool Updated:  09/19/18 1056     Shiga Toxin 1 E.coli Negative     Shiga Toxin 2 E.coli Negative    Clostridium difficile EIA [[id number]]     Order Status:  Canceled Specimen:  Stool               Assessment/Plan:     * Urinary tract infection associated with indwelling urethral catheter    Unfortunately pt has been non-compliant with urology f/u  Flomax and Umana re-placed for untreated BPH and urinary retention noted after hernia repair 9/6/2018  Urology consulted, case discussed, Finasteride added 9/18/18  Rocephin initiated 9/17/18 then switched to Zosyn, sensitivities did not include Rocephin  Cont Zosyn and switch to Cipro prior to discharge          Benign prostatic hyperplasia with urinary retention    Flomax ordered  Urology consult  appreciated  Finasteride added 9/18/2018  Will consider voiding trial tomorrow, o/w will f/u with Urology as outpatient        Incisional hernia, without obstruction or gangrene    Recovering well.  PT consult appreciated        DM type 2 with diabetic peripheral neuropathy    Lab Results   Component Value Date    HGBA1C 5.8 (H) 09/05/2018     Strict BG control and accu checks        Smoker    Smoking cessation encouraged        Other constipation    Mag citrate ordered today 9/22/18  Pericolace and Miralax also ordered        Hyperkalemia    Note on am labs  Repeat K pending        Obesity, Class I, BMI 30-34.9    Low fat DM diet        History of hepatitis C; S/p RX with SVR 23 documented 07/2017    Successfully treated        Essential hypertension    Home meds            Ariella Lerner, DO  General Surgery  [facility]

## 2018-09-22 NOTE — PLAN OF CARE
Problem: Patient Care Overview  Goal: Plan of Care Review  Outcome: Ongoing (interventions implemented as appropriate)  Plan of care reviewed with patient. Patient verbalized complete understanding. Fall/safety precautions maintained. Slip resistant socks on. Bed in lowest position, locked, call light within reach. Side rails up x's 2. Nurse instructed patient to notify staff for any assistance and pt verbalized complete understanding.       IV antibiotics given as scheduled, pt had very large BM since laxatives given, Mobility in bed encouraged,pt encouraged to get up to chair with assistance, pt stated that he will attempt to get in chair for dinner, hutton remains in place, draining urine freely, catheter care completed, Pt SR/AFIB on telemetry, no ectopy or true red alarms noted, pt stable, NAD noted.

## 2018-09-22 NOTE — SUBJECTIVE & OBJECTIVE
Interval History: Pt states he has not had a BM since admission 7/19/2018. No n/v presentsly but had episode yesterday. No f/c. Working with PT. Zosyn restarted yesterday due to rising WBC and concerns of absorption of PO cipro. Pt reports he is not ready for discharge    Medications:  Continuous Infusions:  Scheduled Meds:   amLODIPine  10 mg Oral Daily    enoxaparin  40 mg Subcutaneous Daily    finasteride  5 mg Oral Daily    fluticasone  2 spray Each Nare Daily    insulin detemir U-100  15 Units Subcutaneous QHS    Lactobacillus acidoph-L.bulgar  4 tablet Oral TID WM    pantoprazole  40 mg Oral Daily    piperacillin-tazobactam (ZOSYN) IVPB  4.5 g Intravenous Q8H    simvastatin  40 mg Oral QHS    tamsulosin  0.4 mg Oral Daily     PRN Meds:acetaminophen, acetaminophen, diphenhydrAMINE, glucagon (human recombinant), glucose, glucose, influenza, insulin aspart U-100, ondansetron, sodium chloride 0.9%     Review of patient's allergies indicates:  No Known Allergies  Objective:     Vital Signs (Most Recent):  Temp: 98 °F (36.7 °C) (09/22/18 0733)  Pulse: 79 (09/22/18 0812)  Resp: 18 (09/22/18 0812)  BP: 122/65 (09/22/18 0733)  SpO2: 98 % (09/22/18 0812) Vital Signs (24h Range):  Temp:  [97.5 °F (36.4 °C)-99.6 °F (37.6 °C)] 98 °F (36.7 °C)  Pulse:  [70-91] 79  Resp:  [16-22] 18  SpO2:  [95 %-98 %] 98 %  BP: (122-136)/(65-73) 122/65     Weight: 92.8 kg (204 lb 9.4 oz)  Body mass index is 30.21 kg/m².    Intake/Output - Last 3 Shifts       09/20 0700 - 09/21 0659 09/21 0700 - 09/22 0659 09/22 0700 - 09/23 0659    P.O. 1100 180     I.V. (mL/kg)       IV Piggyback 100 100     Total Intake(mL/kg) 1200 (12.9) 280 (3)     Urine (mL/kg/hr) 950 (0.4) 2225 (1)     Total Output 950 2225     Net +250 -1945                  Physical Exam   Constitutional: He is oriented to person, place, and time. He appears well-developed and well-nourished. No distress.   HENT:   Head: Normocephalic and atraumatic.   Eyes: Conjunctivae  and EOM are normal. Pupils are equal, round, and reactive to light. No scleral icterus.   Neck: Normal range of motion. Neck supple. No JVD present.   Cardiovascular: Normal rate and regular rhythm.   Pulmonary/Chest: Effort normal and breath sounds normal. No respiratory distress.   Abdominal: Soft. He exhibits no distension. There is no tenderness.   Incision c/d/i, decreased BS   Musculoskeletal: Normal range of motion. He exhibits no edema or tenderness.   Neurological: He is alert and oriented to person, place, and time. No cranial nerve deficit.   Skin: Skin is warm and dry. He is not diaphoretic.   Psychiatric: He has a normal mood and affect.       Significant Labs:  CBC:   Recent Labs   Lab  09/22/18   0542   WBC  16.51*   RBC  3.94*   HGB  11.3*   HCT  34.3*   PLT  425*   MCV  87   MCH  28.7   MCHC  32.9     CMP:   Recent Labs   Lab  09/17/18   1222   09/22/18   0343   GLU  193*   < >  114*   CALCIUM  8.5*   < >  8.1*   ALBUMIN  2.7*   --    --    PROT  6.4   --    --    NA  131*   < >  129*   K  4.5   < >  5.5*   CO2  22*   < >  17*   CL  95   < >  101   BUN  29*   < >  16   CREATININE  1.8*   < >  1.3   ALKPHOS  78   --    --    ALT  13   --    --    AST  19   --    --    BILITOT  1.7*   --    --     < > = values in this interval not displayed.     Microbiology Results (last 7 days)     Procedure Component Value Units Date/Time    Blood culture x two cultures. Draw prior to antibiotics. [[id number]] Collected:  09/17/18 1219    Order Status:  Completed Specimen:  Blood from Peripheral, Hand, Right Updated:  09/21/18 2212     Blood Culture, Routine No Growth to date     Blood Culture, Routine No Growth to date     Blood Culture, Routine No Growth to date     Blood Culture, Routine No Growth to date     Blood Culture, Routine No Growth to date    Narrative:       Aerobic and anaerobic    Blood culture x two cultures. Draw prior to antibiotics. [[id number]] Collected:  09/17/18 1222    Order Status:   Completed Specimen:  Blood from Peripheral, Antecubital, Left Updated:  09/21/18 2212     Blood Culture, Routine No Growth to date     Blood Culture, Routine No Growth to date     Blood Culture, Routine No Growth to date     Blood Culture, Routine No Growth to date     Blood Culture, Routine No Growth to date    Narrative:       Aerobic and anaerobic    Stool culture **CANNOT BE ORDERED STAT** [[id number]] Collected:  09/17/18 1529    Order Status:  Completed Specimen:  Stool Updated:  09/21/18 1429     Stool Culture No Salmonella,Shigella,Vibrio,Campylobacter,Yersinia isolated.    Urine culture [[id number]]  (Susceptibility) Collected:  09/17/18 1328    Order Status:  Completed Specimen:  Urine Updated:  09/20/18 1237     Urine Culture, Routine --     PSEUDOMONAS AERUGINOSA  >100,000 cfu/ml  No other significant isolate      Narrative:       Preferred Collection Type->Urine, Clean Catch    E. coli 0157 antigen [[id number]] Collected:  09/17/18 1529    Order Status:  Completed Specimen:  Stool Updated:  09/19/18 1056     Shiga Toxin 1 E.coli Negative     Shiga Toxin 2 E.coli Negative    Clostridium difficile EIA [[id number]]     Order Status:  Canceled Specimen:  Stool

## 2018-09-22 NOTE — ASSESSMENT & PLAN NOTE
Unfortunately pt has been non-compliant with urology f/u  Flomax and Umana re-placed for untreated BPH and urinary retention noted after hernia repair 9/6/2018  Urology consulted, case discussed, Finasteride added 9/18/18  Rocephin initiated 9/17/18 then switched to Zosyn, sensitivities did not include Rocephin  Cont Zosyn and switch to Cipro prior to discharge

## 2018-09-22 NOTE — ASSESSMENT & PLAN NOTE
Flomax ordered  Urology consult appreciated  Finasteride added 9/18/2018  Will consider voiding trial tomorrow, o/w will f/u with Urology as outpatient

## 2018-09-22 NOTE — PLAN OF CARE
HPI Comments: 22 y.o. female with past medical history significant for migraine HA presents with complaints of cough and shortness of breath. The pt states that she was seen at [facility] yesterday and was diagnosed with PNA and started on azithromycin and cough medicines. She states that she woke up this morning with worsening shortness of breath. The pt states that nothing makes the cough/shortness of breath better or worse. The pt cough is non productive. There are no other acute medical complaints at this time. Denies fever, chills, HA, dizziness, light headedness, abdominal pain, N/V/D, melena, hematochezia, loss of bowel/bladder functioning, saddle anesthesia, urinary symptoms or any other acute medical conditions. PCP: MD Elizabeth Corral PA-C      Patient is a 22 y.o. female presenting with shortness of breath. Shortness of Breath   Pertinent negatives include no fever, no rhinorrhea, no sore throat, no ear pain, no cough, no wheezing, no chest pain, no vomiting, no abdominal pain and no rash. Past Medical History:   Diagnosis Date    Migraine        Past Surgical History:   Procedure Laterality Date    Hx tonsil and adenoidectomy           History reviewed. No pertinent family history. Social History     Social History    Marital status: SINGLE     Spouse name: N/A    Number of children: N/A    Years of education: N/A     Occupational History    Not on file. Social History Main Topics    Smoking status: Light Tobacco Smoker    Smokeless tobacco: Never Used    Alcohol use No    Drug use: No    Sexual activity: Not on file     Other Topics Concern    Not on file     Social History Narrative         ALLERGIES: Review of patient's allergies indicates no known allergies. Review of Systems   Constitutional: Negative for activity change, appetite change, diaphoresis and fever.    HENT: Negative for ear discharge, ear pain, facial swelling, rhinorrhea, Problem: Patient Care Overview  Goal: Plan of Care Review  Outcome: Ongoing (interventions implemented as appropriate)  Plan of care reviewed with patient, understanding verbalized. Pt remainson tele. Umana in place and draining.  Pt not eating much today, will encourage po intake. No complaints overnight. Instructed to call for assistance before getting out of bed, understanding verbalized. Bed alarm on, call light in reach, fall precautions in place. Will continue to monitor.         sore throat, tinnitus, trouble swallowing and voice change. Eyes: Negative for photophobia, pain, discharge, redness and visual disturbance. Respiratory: Positive for shortness of breath. Negative for cough, chest tightness, wheezing and stridor. Cardiovascular: Negative for chest pain and palpitations. Gastrointestinal: Negative for abdominal pain, constipation, diarrhea, nausea and vomiting. Endocrine: Negative for polydipsia and polyuria. Genitourinary: Negative for dysuria, flank pain and hematuria. Musculoskeletal: Negative for arthralgias, back pain and myalgias. Skin: Negative for color change and rash. Neurological: Negative for dizziness, syncope, speech difficulty, light-headedness and numbness. Psychiatric/Behavioral: Negative for behavioral problems. Vitals:    01/29/17 1050 01/29/17 1205 01/29/17 1228   BP: 115/82 108/69    Pulse: 98     Resp: 21 18    Temp: 98.3 °F (36.8 °C)     SpO2: 98%  99%   Weight: 82.6 kg (182 lb 3.2 oz)     Height: 5' 6.5\" (1.689 m)              Physical Exam   Constitutional: She is oriented to person, place, and time. She appears well-developed and well-nourished. HENT:   Head: Normocephalic and atraumatic. Eyes: Conjunctivae are normal. Pupils are equal, round, and reactive to light. Right eye exhibits no discharge. Left eye exhibits no discharge. Neck: Normal range of motion. Neck supple. No thyromegaly present. Cardiovascular: Normal rate, regular rhythm and normal heart sounds. Exam reveals no gallop and no friction rub. No murmur heard. Pulmonary/Chest: Effort normal and breath sounds normal. No respiratory distress. She has no wheezes. No Stridors or wheezes. No rales or rhonci. No accessory muscle usage. No nasal flaring. Abdominal: Soft. Bowel sounds are normal. She exhibits no distension. There is no tenderness. There is no rebound and no guarding. Musculoskeletal: Normal range of motion.    Neurological: She is alert and oriented to person, place, and time. Skin: Skin is warm. Psychiatric: She has a normal mood and affect. MDM  Number of Diagnoses or Management Options  Upper respiratory tract infection, unspecified type:   Diagnosis management comments: Pt presents with cough and shortness of breath. EKG, trop, vitals, and CXR all unremarkable. Pt reports improvement of sx after breathing tx and steroids. Will d/c home with steroids, inhaler, and advise follow up with family doctor for further evaluation of symptoms. Reviewed treatment plan with attending and they agree. Keerthi Huizar     ED Course       Procedures      ED EKG interpretation:  Rhythm: Sinus Rhythm with sinus arrhythmia; and regular . Rate (approx.): 68; Axis: normal; P wave: normal; QRS interval: normal ; ST/T wave: normal;  This EKG was interpreted by Yessi Naik PA-C,ED Provider.

## 2018-09-22 NOTE — PLAN OF CARE
Dr. Lerner notified, bright red blood noted in catheter tubing when getting pt back to bed from chair, pt was OOB to chair for over an hr, MD stated will continue to monitor at this time.

## 2018-09-23 PROBLEM — M54.9 BACK PAIN WITH SCIATICA: Status: ACTIVE | Noted: 2018-09-23

## 2018-09-23 PROBLEM — M54.30 BACK PAIN WITH SCIATICA: Status: ACTIVE | Noted: 2018-09-23

## 2018-09-23 LAB
ANION GAP SERPL CALC-SCNC: 10 MMOL/L
ANISOCYTOSIS BLD QL SMEAR: SLIGHT
BASOPHILS # BLD AUTO: ABNORMAL K/UL
BASOPHILS NFR BLD: 0 %
BUN SERPL-MCNC: 12 MG/DL
CALCIUM SERPL-MCNC: 8.7 MG/DL
CHLORIDE SERPL-SCNC: 100 MMOL/L
CO2 SERPL-SCNC: 20 MMOL/L
CREAT SERPL-MCNC: 1.2 MG/DL
DIFFERENTIAL METHOD: ABNORMAL
EOSINOPHIL # BLD AUTO: ABNORMAL K/UL
EOSINOPHIL NFR BLD: 2 %
ERYTHROCYTE [DISTWIDTH] IN BLOOD BY AUTOMATED COUNT: 13.8 %
EST. GFR  (AFRICAN AMERICAN): >60 ML/MIN/1.73 M^2
EST. GFR  (NON AFRICAN AMERICAN): 58 ML/MIN/1.73 M^2
GLUCOSE SERPL-MCNC: 129 MG/DL
HCT VFR BLD AUTO: 37.8 %
HGB BLD-MCNC: 12.4 G/DL
HYPOCHROMIA BLD QL SMEAR: ABNORMAL
LYMPHOCYTES # BLD AUTO: ABNORMAL K/UL
LYMPHOCYTES NFR BLD: 12 %
MAGNESIUM SERPL-MCNC: 2 MG/DL
MCH RBC QN AUTO: 28.8 PG
MCHC RBC AUTO-ENTMCNC: 32.8 G/DL
MCV RBC AUTO: 88 FL
MONOCYTES # BLD AUTO: ABNORMAL K/UL
MONOCYTES NFR BLD: 10 %
NEUTROPHILS NFR BLD: 74 %
NEUTS BAND NFR BLD MANUAL: 2 %
OVALOCYTES BLD QL SMEAR: ABNORMAL
PHOSPHATE SERPL-MCNC: 3.1 MG/DL
PLATELET # BLD AUTO: 422 K/UL
PLATELET BLD QL SMEAR: ABNORMAL
PMV BLD AUTO: 9.8 FL
POCT GLUCOSE: 126 MG/DL (ref 70–110)
POCT GLUCOSE: 151 MG/DL (ref 70–110)
POCT GLUCOSE: 188 MG/DL (ref 70–110)
POCT GLUCOSE: 203 MG/DL (ref 70–110)
POIKILOCYTOSIS BLD QL SMEAR: SLIGHT
POLYCHROMASIA BLD QL SMEAR: ABNORMAL
POTASSIUM SERPL-SCNC: 4.7 MMOL/L
RBC # BLD AUTO: 4.3 M/UL
SODIUM SERPL-SCNC: 130 MMOL/L
WBC # BLD AUTO: 12.57 K/UL

## 2018-09-23 PROCEDURE — 97116 GAIT TRAINING THERAPY: CPT

## 2018-09-23 PROCEDURE — 25000003 PHARM REV CODE 250: Performed by: EMERGENCY MEDICINE

## 2018-09-23 PROCEDURE — 84100 ASSAY OF PHOSPHORUS: CPT

## 2018-09-23 PROCEDURE — 99232 SBSQ HOSP IP/OBS MODERATE 35: CPT | Mod: 24,,, | Performed by: SURGERY

## 2018-09-23 PROCEDURE — 85007 BL SMEAR W/DIFF WBC COUNT: CPT

## 2018-09-23 PROCEDURE — 25000003 PHARM REV CODE 250: Performed by: STUDENT IN AN ORGANIZED HEALTH CARE EDUCATION/TRAINING PROGRAM

## 2018-09-23 PROCEDURE — 63600175 PHARM REV CODE 636 W HCPCS: Performed by: SURGERY

## 2018-09-23 PROCEDURE — 11000001 HC ACUTE MED/SURG PRIVATE ROOM

## 2018-09-23 PROCEDURE — 99900035 HC TECH TIME PER 15 MIN (STAT)

## 2018-09-23 PROCEDURE — 80048 BASIC METABOLIC PNL TOTAL CA: CPT

## 2018-09-23 PROCEDURE — 83735 ASSAY OF MAGNESIUM: CPT

## 2018-09-23 PROCEDURE — 94799 UNLISTED PULMONARY SVC/PX: CPT

## 2018-09-23 PROCEDURE — 36415 COLL VENOUS BLD VENIPUNCTURE: CPT

## 2018-09-23 PROCEDURE — G8980 MOBILITY D/C STATUS: HCPCS | Mod: CK

## 2018-09-23 PROCEDURE — 97530 THERAPEUTIC ACTIVITIES: CPT

## 2018-09-23 PROCEDURE — 94761 N-INVAS EAR/PLS OXIMETRY MLT: CPT

## 2018-09-23 PROCEDURE — 25000003 PHARM REV CODE 250: Performed by: SURGERY

## 2018-09-23 PROCEDURE — 85027 COMPLETE CBC AUTOMATED: CPT

## 2018-09-23 RX ORDER — GABAPENTIN 300 MG/1
300 CAPSULE ORAL 2 TIMES DAILY
Status: DISCONTINUED | OUTPATIENT
Start: 2018-09-23 | End: 2018-09-24 | Stop reason: HOSPADM

## 2018-09-23 RX ORDER — CIPROFLOXACIN 100 MG/1
100 TABLET, FILM COATED ORAL EVERY 12 HOURS
Status: DISCONTINUED | OUTPATIENT
Start: 2018-09-23 | End: 2018-09-23

## 2018-09-23 RX ORDER — CIPROFLOXACIN 500 MG/1
500 TABLET ORAL EVERY 12 HOURS
Status: DISCONTINUED | OUTPATIENT
Start: 2018-09-23 | End: 2018-09-24 | Stop reason: HOSPADM

## 2018-09-23 RX ADMIN — ACETAMINOPHEN 650 MG: 325 TABLET, FILM COATED ORAL at 05:09

## 2018-09-23 RX ADMIN — PANTOPRAZOLE SODIUM 40 MG: 40 TABLET, DELAYED RELEASE ORAL at 08:09

## 2018-09-23 RX ADMIN — AMLODIPINE BESYLATE 10 MG: 5 TABLET ORAL at 08:09

## 2018-09-23 RX ADMIN — LACTOBACILLUS TAB 4 TABLET: TAB at 08:09

## 2018-09-23 RX ADMIN — CIPROFLOXACIN 500 MG: 500 TABLET, FILM COATED ORAL at 09:09

## 2018-09-23 RX ADMIN — LACTOBACILLUS TAB 4 TABLET: TAB at 12:09

## 2018-09-23 RX ADMIN — SIMVASTATIN 40 MG: 20 TABLET, FILM COATED ORAL at 09:09

## 2018-09-23 RX ADMIN — TAMSULOSIN HYDROCHLORIDE 0.4 MG: 0.4 CAPSULE ORAL at 08:09

## 2018-09-23 RX ADMIN — ENOXAPARIN SODIUM 40 MG: 100 INJECTION SUBCUTANEOUS at 04:09

## 2018-09-23 RX ADMIN — ACETAMINOPHEN 650 MG: 325 TABLET, FILM COATED ORAL at 10:09

## 2018-09-23 RX ADMIN — STANDARDIZED SENNA CONCENTRATE AND DOCUSATE SODIUM 2 TABLET: 8.6; 5 TABLET, FILM COATED ORAL at 09:09

## 2018-09-23 RX ADMIN — LACTOBACILLUS TAB 4 TABLET: TAB at 04:09

## 2018-09-23 RX ADMIN — INSULIN DETEMIR 15 UNITS: 100 INJECTION, SOLUTION SUBCUTANEOUS at 09:09

## 2018-09-23 RX ADMIN — FLUTICASONE PROPIONATE 100 MCG: 50 SPRAY, METERED NASAL at 08:09

## 2018-09-23 RX ADMIN — CIPROFLOXACIN 500 MG: 500 TABLET, FILM COATED ORAL at 10:09

## 2018-09-23 RX ADMIN — DIPHENHYDRAMINE HYDROCHLORIDE 25 MG: 25 CAPSULE ORAL at 09:09

## 2018-09-23 RX ADMIN — FINASTERIDE 5 MG: 5 TABLET, FILM COATED ORAL at 08:09

## 2018-09-23 RX ADMIN — GABAPENTIN 300 MG: 300 CAPSULE ORAL at 09:09

## 2018-09-23 RX ADMIN — PIPERACILLIN AND TAZOBACTAM 4.5 G: 4; .5 INJECTION, POWDER, LYOPHILIZED, FOR SOLUTION INTRAVENOUS; PARENTERAL at 02:09

## 2018-09-23 RX ADMIN — GABAPENTIN 300 MG: 300 CAPSULE ORAL at 10:09

## 2018-09-23 RX ADMIN — DIPHENHYDRAMINE HYDROCHLORIDE 25 MG: 25 CAPSULE ORAL at 02:09

## 2018-09-23 NOTE — SUBJECTIVE & OBJECTIVE
Interval History: No overnight events. Large BM after mag citrate. OOB to chair with assist yesterday. Blood noted in catheter tubing after mobilization, urine output now clear. WBC normal this am. Repeat K normal yesterday and today. Pt reports L wrist pain. HH ordered Fri, accommodations pending. No n/v. No f/c. No abd pain. C/o pain and leg pain, intermittent, which is his baseline. Requests gabapentin, which is home med.    Medications:  Continuous Infusions:  Scheduled Meds:   amLODIPine  10 mg Oral Daily    enoxaparin  40 mg Subcutaneous Daily    finasteride  5 mg Oral Daily    fluticasone  2 spray Each Nare Daily    insulin detemir U-100  15 Units Subcutaneous QHS    Lactobacillus acidoph-L.bulgar  4 tablet Oral TID WM    pantoprazole  40 mg Oral Daily    piperacillin-tazobactam (ZOSYN) IVPB  4.5 g Intravenous Q8H    polyethylene glycol  17 g Oral Daily    senna-docusate 8.6-50 mg  2 tablet Oral BID    simvastatin  40 mg Oral QHS    tamsulosin  0.4 mg Oral Daily     PRN Meds:acetaminophen, acetaminophen, diphenhydrAMINE, glucagon (human recombinant), glucose, glucose, influenza, insulin aspart U-100, ondansetron, sodium chloride 0.9%     Review of patient's allergies indicates:  No Known Allergies  Objective:     Vital Signs (Most Recent):  Temp: 97.6 °F (36.4 °C) (09/23/18 0726)  Pulse: 78 (09/23/18 0820)  Resp: 18 (09/23/18 0820)  BP: (!) 145/70 (09/23/18 0726)  SpO2: 99 % (09/23/18 0820) Vital Signs (24h Range):  Temp:  [97.3 °F (36.3 °C)-98.9 °F (37.2 °C)] 97.6 °F (36.4 °C)  Pulse:  [72-85] 78  Resp:  [16-18] 18  SpO2:  [94 %-99 %] 99 %  BP: (130-145)/(70-78) 145/70     Weight: 92.8 kg (204 lb 9.4 oz)  Body mass index is 30.21 kg/m².    Intake/Output - Last 3 Shifts       09/21 0700 - 09/22 0659 09/22 0700 - 09/23 0659 09/23 0700 - 09/24 0659    P.O. 180      IV Piggyback 100      Total Intake(mL/kg) 280 (3)      Urine (mL/kg/hr) 2225 (1) 2250 (1)     Total Output 2225 2250     Net -1945 -2250             Stool Occurrence  1 x           Physical Exam   Constitutional: He is oriented to person, place, and time. He appears well-developed and well-nourished. No distress.   HENT:   Head: Normocephalic and atraumatic.   Eyes: No scleral icterus.   Neck: Normal range of motion. Neck supple. No JVD present.   Cardiovascular: Normal rate and regular rhythm.   Pulmonary/Chest: Effort normal and breath sounds normal. No respiratory distress.   Abdominal: Soft. Bowel sounds are normal. He exhibits no distension. There is no tenderness. There is no rebound and no guarding.   Incisions c/d/i   Musculoskeletal:   Clubbed L foot, superficial wound on medial MTP joint   Neurological: He is alert and oriented to person, place, and time. No cranial nerve deficit.   Skin: Skin is warm and dry. He is not diaphoretic.   Psychiatric: He has a normal mood and affect.       Significant Labs:  CBC:   Recent Labs   Lab  09/23/18   0345   WBC  12.57   RBC  4.30*   HGB  12.4*   HCT  37.8*   PLT  422*   MCV  88   MCH  28.8   MCHC  32.8     CMP:   Recent Labs   Lab  09/17/18   1222   09/23/18   0345   GLU  193*   < >  129*   CALCIUM  8.5*   < >  8.7   ALBUMIN  2.7*   --    --    PROT  6.4   --    --    NA  131*   < >  130*   K  4.5   < >  4.7   CO2  22*   < >  20*   CL  95   < >  100   BUN  29*   < >  12   CREATININE  1.8*   < >  1.2   ALKPHOS  78   --    --    ALT  13   --    --    AST  19   --    --    BILITOT  1.7*   --    --     < > = values in this interval not displayed.     Microbiology Results (last 7 days)     Procedure Component Value Units Date/Time    Blood culture x two cultures. Draw prior to antibiotics. [[id number]] Collected:  09/17/18 1222    Order Status:  Completed Specimen:  Blood from Peripheral, Antecubital, Left Updated:  09/22/18 2212     Blood Culture, Routine No growth after 5 days.    Narrative:       Aerobic and anaerobic    Blood culture x two cultures. Draw prior to antibiotics. [[id number]] Collected:   09/17/18 1219    Order Status:  Completed Specimen:  Blood from Peripheral, Hand, Right Updated:  09/22/18 2212     Blood Culture, Routine No growth after 5 days.    Narrative:       Aerobic and anaerobic    Stool culture **CANNOT BE ORDERED STAT** [[id number]] Collected:  09/17/18 1529    Order Status:  Completed Specimen:  Stool Updated:  09/21/18 1429     Stool Culture No Salmonella,Shigella,Vibrio,Campylobacter,Yersinia isolated.    Urine culture [[id number]]  (Susceptibility) Collected:  09/17/18 1328    Order Status:  Completed Specimen:  Urine Updated:  09/20/18 1237     Urine Culture, Routine --     PSEUDOMONAS AERUGINOSA  >100,000 cfu/ml  No other significant isolate      Narrative:       Preferred Collection Type->Urine, Clean Catch    E. coli 0157 antigen [[id number]] Collected:  09/17/18 1529    Order Status:  Completed Specimen:  Stool Updated:  09/19/18 1056     Shiga Toxin 1 E.coli Negative     Shiga Toxin 2 E.coli Negative    Clostridium difficile EIA [[id number]]     Order Status:  Canceled Specimen:  Stool

## 2018-09-23 NOTE — PT/OT/SLP PROGRESS
Physical Therapy Treatment    Patient Name:  Jose Roca   MRN:  [id number]    Recommendations:     Discharge Recommendations:  home with home health   Discharge Equipment Recommendations: none   Barriers to discharge: None    Assessment:     Jose Roca is a 78 y.o. male admitted with a medical diagnosis of Urinary tract infection associated with indwelling urethral catheter.  He presents with the following impairments/functional limitations:  weakness, impaired endurance, impaired functional mobilty, gait instability, impaired balance, decreased lower extremity function, impaired skin.  Pt demonstrated an increase in ambulation distance today.  Pt would continue to benefit from P.T. To address impairments listed above.    Rehab Prognosis:  good; patient would benefit from acute skilled PT services to address these deficits and reach maximum level of function.      Recent Surgery: * No surgery found *      Plan:     During this hospitalization, patient to be seen 6 x/week to address the above listed problems via gait training, therapeutic activities, therapeutic exercises  · Plan of Care Expires:  10/20/18   Plan of Care Reviewed with: patient    Subjective     Communicated with RN (Antionette) prior to session.  Patient found supine upon PT entry to room, agreeable to treatment.      Patient comments/goals: Pt agreed to tx.  Pain/Comfort:  · Pain Rating 1: 0/10  · Pain Rating Post-Intervention 1: 0/10    Patients cultural, spiritual, Hinduism conflicts given the current situation:      Objective:     Patient found with: central line     General Precautions: Standard, fall   Orthopedic Precautions:N/A   Braces:       Functional Mobility:  · Bed Mobility:     · Rolling Right: modified independence  · Scooting: modified independence and to EOB  · Supine to Sit: modified independence and supervision  · Transfers:     · Sit to Stand:  minimum assistance with rolling walker  · Bed to Chair: contact guard assistance  with  rolling walker  using  Step Transfer  · Gait: 55ft with RW and Chrissy/CGA.  Pt initially required Chrissy for RW management, but progress to CGA with vc's for upright posture and to stay closer to RW. 1 to 2 bouts of unsteadiness without LOB.  · Balance: sitting good, standing with RW fair/fair+, gait with RW fair      AM-PAC 6 CLICK MOBILITY  Turning over in bed (including adjusting bedclothes, sheets and blankets)?: 4  Sitting down on and standing up from a chair with arms (e.g., wheelchair, bedside commode, etc.): 3  Moving from lying on back to sitting on the side of the bed?: 3  Moving to and from a bed to a chair (including a wheelchair)?: 3  Need to walk in hospital room?: 3  Climbing 3-5 steps with a railing?: 3  Basic Mobility Total Score: 19       Therapeutic Activities and Exercises:   Seated BLE therex: AP, LAQ, hip flexion/ABD/ADD, glut sets x 15 reps with vc's/tc's for proper technique.  Static standing with RW and CGA/SBA to down gown around back of pt.  Pt sat EOB x 10 mins with S/Mod I.      Patient left up in chair with all lines intact, call button in reach, chair alarm on and RN notified..    GOALS:   Multidisciplinary Problems     Physical Therapy Goals        Problem: Physical Therapy Goal    Goal Priority Disciplines Outcome Goal Variances Interventions   Physical Therapy Goal     PT, PT/OT Ongoing (interventions implemented as appropriate)     Description:  Goals to be met by: 10/20/2018     Patient will increase functional independence with mobility by performin. Supine to sit with Modified [place]  2. Sit to stand transfer with Modified [place]  3. Gait  x 150 feet with Modified [place] using Rolling Walker.                       Time Tracking:     PT Received On: 18  PT Start Time: 1125     PT Stop Time: 1149  PT Total Time (min): 24 min     Billable Minutes: Gait Training 10 and Therapeutic Activity 14    Treatment Type: Treatment  PT/PTA: PTA     PTA Visit  Number: 1, 3     Cristina Martinez, ZACHARY  09/23/2018

## 2018-09-23 NOTE — ASSESSMENT & PLAN NOTE
Flomax ordered  Urology consult appreciated  Finasteride added 9/18/2018  Voiding trial today  f/u with Urology as outpatient

## 2018-09-23 NOTE — PROGRESS NOTES
[facility]  General Surgery  Progress Note    Subjective:     History of Present Illness:  No notes on file    Post-Op Info:  * No surgery found *         Interval History: No overnight events. Large BM after mag citrate. OOB to chair with assist yesterday. Blood noted in catheter tubing after mobilization, urine output now clear. WBC normal this am. Repeat K normal yesterday and today. Pt reports L wrist pain. HH ordered Fri, accommodations pending. No n/v. No f/c. No abd pain. C/o pain and leg pain, intermittent, which is his baseline. Requests gabapentin, which is home med.    Medications:  Continuous Infusions:  Scheduled Meds:   amLODIPine  10 mg Oral Daily    enoxaparin  40 mg Subcutaneous Daily    finasteride  5 mg Oral Daily    fluticasone  2 spray Each Nare Daily    insulin detemir U-100  15 Units Subcutaneous QHS    Lactobacillus acidoph-L.bulgar  4 tablet Oral TID WM    pantoprazole  40 mg Oral Daily    piperacillin-tazobactam (ZOSYN) IVPB  4.5 g Intravenous Q8H    polyethylene glycol  17 g Oral Daily    senna-docusate 8.6-50 mg  2 tablet Oral BID    simvastatin  40 mg Oral QHS    tamsulosin  0.4 mg Oral Daily     PRN Meds:acetaminophen, acetaminophen, diphenhydrAMINE, glucagon (human recombinant), glucose, glucose, influenza, insulin aspart U-100, ondansetron, sodium chloride 0.9%     Review of patient's allergies indicates:  No Known Allergies  Objective:     Vital Signs (Most Recent):  Temp: 97.6 °F (36.4 °C) (09/23/18 0726)  Pulse: 78 (09/23/18 0820)  Resp: 18 (09/23/18 0820)  BP: (!) 145/70 (09/23/18 0726)  SpO2: 99 % (09/23/18 0820) Vital Signs (24h Range):  Temp:  [97.3 °F (36.3 °C)-98.9 °F (37.2 °C)] 97.6 °F (36.4 °C)  Pulse:  [72-85] 78  Resp:  [16-18] 18  SpO2:  [94 %-99 %] 99 %  BP: (130-145)/(70-78) 145/70     Weight: 92.8 kg (204 lb 9.4 oz)  Body mass index is 30.21 kg/m².    Intake/Output - Last 3 Shifts       09/21 0700 - 09/22 0659 09/22 0700 - 09/23 0659  09/23 0700 - 09/24 0659    P.O. 180      IV Piggyback 100      Total Intake(mL/kg) 280 (3)      Urine (mL/kg/hr) 2225 (1) 2250 (1)     Total Output 2225 2250     Net -1945 -2250            Stool Occurrence  1 x           Physical Exam   Constitutional: He is oriented to person, place, and time. He appears well-developed and well-nourished. No distress.   HENT:   Head: Normocephalic and atraumatic.   Eyes: No scleral icterus.   Neck: Normal range of motion. Neck supple. No JVD present.   Cardiovascular: Normal rate and regular rhythm.   Pulmonary/Chest: Effort normal and breath sounds normal. No respiratory distress.   Abdominal: Soft. Bowel sounds are normal. He exhibits no distension. There is no tenderness. There is no rebound and no guarding.   Incisions c/d/i   Musculoskeletal:   Clubbed L foot, superficial wound on medial MTP joint   Neurological: He is alert and oriented to person, place, and time. No cranial nerve deficit.   Skin: Skin is warm and dry. He is not diaphoretic.   Psychiatric: He has a normal mood and affect.       Significant Labs:  CBC:   Recent Labs   Lab  09/23/18   0345   WBC  12.57   RBC  4.30*   HGB  12.4*   HCT  37.8*   PLT  422*   MCV  88   MCH  28.8   MCHC  32.8     CMP:   Recent Labs   Lab  09/17/18   1222   09/23/18   0345   GLU  193*   < >  129*   CALCIUM  8.5*   < >  8.7   ALBUMIN  2.7*   --    --    PROT  6.4   --    --    NA  131*   < >  130*   K  4.5   < >  4.7   CO2  22*   < >  20*   CL  95   < >  100   BUN  29*   < >  12   CREATININE  1.8*   < >  1.2   ALKPHOS  78   --    --    ALT  13   --    --    AST  19   --    --    BILITOT  1.7*   --    --     < > = values in this interval not displayed.     Microbiology Results (last 7 days)     Procedure Component Value Units Date/Time    Blood culture x two cultures. Draw prior to antibiotics. [[id number]] Collected:  09/17/18 1222    Order Status:  Completed Specimen:  Blood from Peripheral, Antecubital, Left Updated:  09/22/18 7259      Blood Culture, Routine No growth after 5 days.    Narrative:       Aerobic and anaerobic    Blood culture x two cultures. Draw prior to antibiotics. [[id number]] Collected:  09/17/18 1219    Order Status:  Completed Specimen:  Blood from Peripheral, Hand, Right Updated:  09/22/18 2212     Blood Culture, Routine No growth after 5 days.    Narrative:       Aerobic and anaerobic    Stool culture **CANNOT BE ORDERED STAT** [[id number]] Collected:  09/17/18 1529    Order Status:  Completed Specimen:  Stool Updated:  09/21/18 1429     Stool Culture No Salmonella,Shigella,Vibrio,Campylobacter,Yersinia isolated.    Urine culture [[id number]]  (Susceptibility) Collected:  09/17/18 1328    Order Status:  Completed Specimen:  Urine Updated:  09/20/18 1237     Urine Culture, Routine --     PSEUDOMONAS AERUGINOSA  >100,000 cfu/ml  No other significant isolate      Narrative:       Preferred Collection Type->Urine, Clean Catch    E. coli 0157 antigen [[id number]] Collected:  09/17/18 1529    Order Status:  Completed Specimen:  Stool Updated:  09/19/18 1056     Shiga Toxin 1 E.coli Negative     Shiga Toxin 2 E.coli Negative    Clostridium difficile EIA [[id number]]     Order Status:  Canceled Specimen:  Stool         Assessment/Plan:     * Urinary tract infection associated with indwelling urethral catheter    Unfortunately pt has been non-compliant with urology f/u  Flomax and Umana re-placed for untreated BPH and urinary retention noted after hernia repair 9/6/2018  Urology consulted, case discussed, Finasteride added 9/18/18  Will do voiding trial today 9/23/18  Rocephin initiated 9/17/18 then switched to Zosyn, sensitivities did not include Rocephin  WBC normalized today 9/23/18, will switch to Cipro now        Benign prostatic hyperplasia with urinary retention    Flomax ordered  Urology consult appreciated  Finasteride added 9/18/2018  Voiding trial today  f/u with Urology as outpatient        Incisional hernia, without  obstruction or gangrene    Recovering well.  PT consult appreciated        DM type 2 with diabetic peripheral neuropathy    Lab Results   Component Value Date    HGBA1C 5.8 (H) 09/05/2018     Strict BG control and accu checks        Smoker    Smoking cessation encouraged        Back pain with sciatica    Restart home med gabapentin        Other constipation    Mag citrate ordered 9/22/18, pt had multiple large BM and feels better  Cont Pericolace and Miralax          Hyperkalemia    Noted once on am labs 9/22/2018  Repeat K normal 9/22/218 and remains normal  Likely lab error        Obesity, Class I, BMI 30-34.9    Low fat DM diet        History of hepatitis C; S/p RX with SVR 23 documented 07/2017    Successfully treated        HLD (hyperlipidemia)    Home meds        Essential hypertension    Home meds          *Pt and family not prepared for d/c today. No family will be home to receive him or care for him today. HH pending. Will plan d/c home tomorrow with HH. Switching antibx to cipro PO today. Will attempt voiding trial today as well.*    Ariella Lerner, DO  General Surgery  [facility]

## 2018-09-23 NOTE — PLAN OF CARE
Problem: Physical Therapy Goal  Goal: Physical Therapy Goal  Goals to be met by: 10/20/2018     Patient will increase functional independence with mobility by performin. Supine to sit with Modified [place]  2. Sit to stand transfer with Modified [place]  3. Gait  x 150 feet with Modified [place] using Rolling Walker.      Outcome: Ongoing (interventions implemented as appropriate)  Continue working toward goals

## 2018-09-23 NOTE — PLAN OF CARE
Umana catheter removed per MD order, cath tip intact, joy care performed, pt tolerated well, nurse instructed pt to notify nurse at the time of first void after cath removal.

## 2018-09-23 NOTE — PLAN OF CARE
Problem: Patient Care Overview  Goal: Plan of Care Review  Outcome: Ongoing (interventions implemented as appropriate)  Patient on RA, no respiratory distress noted. Patient performs IS therapy well and is ready for self-instruct. Will continue to monitor.

## 2018-09-23 NOTE — PLAN OF CARE
notified, pt IV infiltrated, pt refusing another PIV at this time, MD stated OK to leave IV out this time.

## 2018-09-23 NOTE — PLAN OF CARE
Problem: Patient Care Overview  Goal: Plan of Care Review  Outcome: Ongoing (interventions implemented as appropriate)  Plan of care reviewed with patient, understanding verbalized. Pt remains SR on tele. IV antibiotics administered per order. Foely patent, draining urine freely. BG monitored throughout shift. No complaints overnight.  Bed alarm on, call light in reach, fall precautions in place. Will continue to monitor.

## 2018-09-23 NOTE — PLAN OF CARE
Problem: Patient Care Overview  Goal: Plan of Care Review  Plan of care reviewed with patient. Patient verbalized complete understanding. Fall/safety precautions maintained. Slip resistant socks on. Bed in lowest position, locked, call light within reach. Side rails up x's 2. Nurse instructed patient to notify staff for any assistance and pt verbalized complete understanding.       Umana cath removed today, pt voided per urinal after, pt now on oral antibiotics, mobility in bed and pt encouraged to sit in chair, pt up to chair with physical therapy today, heat applied to L hand with moderate relief and PRN also Tylenol given, BG monitored through out shift, pt stable, NAD noted, Pt SR on telemetry,no ectopy or true red alarms noted, will continue to monitor.

## 2018-09-23 NOTE — ASSESSMENT & PLAN NOTE
Unfortunately pt has been non-compliant with urology f/u  Flomax and Umana re-placed for untreated BPH and urinary retention noted after hernia repair 9/6/2018  Urology consulted, case discussed, Finasteride added 9/18/18  Will do voiding trial today 9/23/18  Rocephin initiated 9/17/18 then switched to Zosyn, sensitivities did not include Rocephin  WBC normalized today 9/23/18, will switch to Cipro now

## 2018-09-23 NOTE — ASSESSMENT & PLAN NOTE
Mag citrate ordered 9/22/18, pt had multiple large BM and feels better  Cont Pericolace and Miralax

## 2018-09-24 ENCOUNTER — TELEPHONE (OUTPATIENT)
Dept: HEPATOLOGY | Facility: CLINIC | Age: 78
End: 2018-09-24

## 2018-09-24 VITALS
HEIGHT: 69 IN | RESPIRATION RATE: 17 BRPM | OXYGEN SATURATION: 98 % | BODY MASS INDEX: 30.3 KG/M2 | WEIGHT: 204.56 LBS | TEMPERATURE: 97 F | HEART RATE: 82 BPM | SYSTOLIC BLOOD PRESSURE: 135 MMHG | DIASTOLIC BLOOD PRESSURE: 96 MMHG

## 2018-09-24 PROBLEM — T83.511A URINARY TRACT INFECTION ASSOCIATED WITH INDWELLING URETHRAL CATHETER: Status: RESOLVED | Noted: 2018-09-18 | Resolved: 2018-09-24

## 2018-09-24 PROBLEM — E87.5 HYPERKALEMIA: Status: RESOLVED | Noted: 2018-09-22 | Resolved: 2018-09-24

## 2018-09-24 PROBLEM — N39.0 URINARY TRACT INFECTION ASSOCIATED WITH INDWELLING URETHRAL CATHETER: Status: RESOLVED | Noted: 2018-09-18 | Resolved: 2018-09-24

## 2018-09-24 PROBLEM — K59.09 OTHER CONSTIPATION: Status: RESOLVED | Noted: 2018-09-22 | Resolved: 2018-09-24

## 2018-09-24 LAB
ANION GAP SERPL CALC-SCNC: 11 MMOL/L
ANISOCYTOSIS BLD QL SMEAR: ABNORMAL
BASOPHILS NFR BLD: 0 %
BUN SERPL-MCNC: 9 MG/DL
CALCIUM SERPL-MCNC: 9 MG/DL
CHLORIDE SERPL-SCNC: 101 MMOL/L
CO2 SERPL-SCNC: 20 MMOL/L
CREAT SERPL-MCNC: 1.4 MG/DL
DIFFERENTIAL METHOD: ABNORMAL
EOSINOPHIL NFR BLD: 6 %
ERYTHROCYTE [DISTWIDTH] IN BLOOD BY AUTOMATED COUNT: 13.8 %
EST. GFR  (AFRICAN AMERICAN): 56 ML/MIN/1.73 M^2
EST. GFR  (NON AFRICAN AMERICAN): 48 ML/MIN/1.73 M^2
GLUCOSE SERPL-MCNC: 146 MG/DL
HCT VFR BLD AUTO: 50.4 %
HGB BLD-MCNC: 16.3 G/DL
HYPOCHROMIA BLD QL SMEAR: ABNORMAL
LYMPHOCYTES NFR BLD: 16 %
MAGNESIUM SERPL-MCNC: 2.1 MG/DL
MCH RBC QN AUTO: 28.8 PG
MCHC RBC AUTO-ENTMCNC: 32.3 G/DL
MCV RBC AUTO: 89 FL
MONOCYTES NFR BLD: 4 %
NEUTROPHILS NFR BLD: 74 %
OVALOCYTES BLD QL SMEAR: ABNORMAL
PHOSPHATE SERPL-MCNC: 3.2 MG/DL
PLATELET # BLD AUTO: 300 K/UL
PLATELET BLD QL SMEAR: ABNORMAL
PMV BLD AUTO: 9.4 FL
POCT GLUCOSE: 131 MG/DL (ref 70–110)
POCT GLUCOSE: 137 MG/DL (ref 70–110)
POIKILOCYTOSIS BLD QL SMEAR: SLIGHT
POLYCHROMASIA BLD QL SMEAR: ABNORMAL
POTASSIUM SERPL-SCNC: 4.6 MMOL/L
RBC # BLD AUTO: 5.66 M/UL
SODIUM SERPL-SCNC: 132 MMOL/L
WBC # BLD AUTO: 9.61 K/UL

## 2018-09-24 PROCEDURE — 90471 IMMUNIZATION ADMIN: CPT | Performed by: SURGERY

## 2018-09-24 PROCEDURE — 99900035 HC TECH TIME PER 15 MIN (STAT)

## 2018-09-24 PROCEDURE — 25000003 PHARM REV CODE 250: Performed by: EMERGENCY MEDICINE

## 2018-09-24 PROCEDURE — 83735 ASSAY OF MAGNESIUM: CPT

## 2018-09-24 PROCEDURE — 63600175 PHARM REV CODE 636 W HCPCS: Performed by: SURGERY

## 2018-09-24 PROCEDURE — 85007 BL SMEAR W/DIFF WBC COUNT: CPT

## 2018-09-24 PROCEDURE — 90662 IIV NO PRSV INCREASED AG IM: CPT | Performed by: SURGERY

## 2018-09-24 PROCEDURE — 80048 BASIC METABOLIC PNL TOTAL CA: CPT

## 2018-09-24 PROCEDURE — 36415 COLL VENOUS BLD VENIPUNCTURE: CPT

## 2018-09-24 PROCEDURE — 84100 ASSAY OF PHOSPHORUS: CPT

## 2018-09-24 PROCEDURE — 3E0234Z INTRODUCTION OF SERUM, TOXOID AND VACCINE INTO MUSCLE, PERCUTANEOUS APPROACH: ICD-10-PCS | Performed by: SURGERY

## 2018-09-24 PROCEDURE — G0008 ADMIN INFLUENZA VIRUS VAC: HCPCS | Performed by: SURGERY

## 2018-09-24 PROCEDURE — 94799 UNLISTED PULMONARY SVC/PX: CPT

## 2018-09-24 PROCEDURE — 25000003 PHARM REV CODE 250: Performed by: SURGERY

## 2018-09-24 PROCEDURE — 25000003 PHARM REV CODE 250: Performed by: STUDENT IN AN ORGANIZED HEALTH CARE EDUCATION/TRAINING PROGRAM

## 2018-09-24 PROCEDURE — 94761 N-INVAS EAR/PLS OXIMETRY MLT: CPT

## 2018-09-24 PROCEDURE — 85027 COMPLETE CBC AUTOMATED: CPT

## 2018-09-24 PROCEDURE — 99231 SBSQ HOSP IP/OBS SF/LOW 25: CPT | Mod: 24,,, | Performed by: SURGERY

## 2018-09-24 RX ORDER — CIPROFLOXACIN 500 MG/1
500 TABLET ORAL EVERY 12 HOURS
Qty: 12 TABLET | Refills: 0 | Status: SHIPPED | OUTPATIENT
Start: 2018-09-24 | End: 2018-09-30

## 2018-09-24 RX ORDER — FINASTERIDE 5 MG/1
5 TABLET, FILM COATED ORAL DAILY
Qty: 30 TABLET | Refills: 11 | Status: SHIPPED | OUTPATIENT
Start: 2018-09-24 | End: 2019-09-24

## 2018-09-24 RX ADMIN — PANTOPRAZOLE SODIUM 40 MG: 40 TABLET, DELAYED RELEASE ORAL at 08:09

## 2018-09-24 RX ADMIN — STANDARDIZED SENNA CONCENTRATE AND DOCUSATE SODIUM 2 TABLET: 8.6; 5 TABLET, FILM COATED ORAL at 08:09

## 2018-09-24 RX ADMIN — LACTOBACILLUS TAB 4 TABLET: TAB at 08:09

## 2018-09-24 RX ADMIN — AMLODIPINE BESYLATE 10 MG: 5 TABLET ORAL at 08:09

## 2018-09-24 RX ADMIN — POLYETHYLENE GLYCOL 3350 17 G: 17 POWDER, FOR SOLUTION ORAL at 08:09

## 2018-09-24 RX ADMIN — FINASTERIDE 5 MG: 5 TABLET, FILM COATED ORAL at 09:09

## 2018-09-24 RX ADMIN — FLUTICASONE PROPIONATE 100 MCG: 50 SPRAY, METERED NASAL at 08:09

## 2018-09-24 RX ADMIN — TAMSULOSIN HYDROCHLORIDE 0.4 MG: 0.4 CAPSULE ORAL at 08:09

## 2018-09-24 RX ADMIN — GABAPENTIN 300 MG: 300 CAPSULE ORAL at 09:09

## 2018-09-24 RX ADMIN — INFLUENZA A VIRUS A/MICHIGAN/45/2015 X-275 (H1N1) ANTIGEN (FORMALDEHYDE INACTIVATED), INFLUENZA A VIRUS A/SINGAPORE/INFIMH-16-0019/2016 IVR-186 (H3N2) ANTIGEN (FORMALDEHYDE INACTIVATED), AND INFLUENZA B VIRUS B/MARYLAND/15/2016 BX-69A (A B/COLORADO/6/2017-LIKE VIRUS) ANTIGEN (FORMALDEHYDE INACTIVATED) 0.5 ML: 60; 60; 60 INJECTION, SUSPENSION INTRAMUSCULAR at 01:09

## 2018-09-24 RX ADMIN — CIPROFLOXACIN 500 MG: 500 TABLET, FILM COATED ORAL at 08:09

## 2018-09-24 NOTE — DISCHARGE SUMMARY
[facility]  General Surgery  Discharge Summary      Patient Name: Jose Roca  MRN: [id number]  Admission Date: 9/17/2018  Hospital Length of Stay: 7 days  Discharge Date and Time:  09/24/2018 10:55 AM  Attending Physician: Ariella Lerner DO   Discharging Provider: Mike Pantoja Jr, MD  Primary Care Provider: Martha Jamison MD     HPI:  Patient is a 78-year-old black male with history of diabetes hypertension hyperlipidemia status post combo laparoscopic and open incisional hernia repair on 09/05/2018 at [facility].  Patient developed acute urinary retention and failed multiple voiding trials.  He also missed follow-up urology appointment.  He presented to the ER with a suspected UTI and leukocytosis diarrhea and amylase.  CT scan showed no signs of infection around the hernia repair however the prostate was found to be hypertrophic.  UA was positive for UTI and urine cultures are pending.      Hospital Course:  Patient was admitted to the hospital and started on IV Rocephin.  His leukocytosis felt to improve any continued to have malaise.  He was therefore switched to Zosyn IV which improved his leukocytosis and his malaise started to improve as well. He did not have any further episodes of diarrhea after admission.  Stool studies were negative and C diff study was never collected consider diarrhea has stopped.  Urology was consulted and recommended keeping the Umaan in place for the time being continuing the Flomax and added Finasteride.  Later during his hospital stay his Umana catheter was removed and he was given an additional voiding trial which he passed.  He will follow up with Urology for evaluation of his BPH.  Patient continues to urinate without any difficulty. He remained afebrile and again his leukocytosis improved to where his white count was normal. He was subsequently transitioned to p.o. Cipro from which the urine cultures which showed Pseudomonas  was sensitive to.  Patient worked with Physical therapy was able to sit in the chair and ambulate with some assistance.  Of note he did use a walker at home due to a foot deformity.  It was felt that he was stable enough to be discharged home and to continue the p.o. Cipro for total of 7 days.  Social Work was consulted to evaluate for home health and possible outpatient physical therapy.    Consults:   Consults (From admission, onward)        Status Ordering Provider     Inpatient consult to Social Work  Once     Provider:  (Not yet assigned)    Acknowledged DENISHA PEREZ JR     Inpatient consult to Urology  Once     Provider:  Rossy eHrndon MD    Completed OMAYRA ALARCON     IP consult to case management  Once     Provider:  (Not yet assigned)    Acknowledged DENISHA PEREZ JR          Significant Diagnostic Studies: Microbiology:   Blood Culture   Lab Results   Component Value Date    LABBLOO No growth after 5 days. 09/17/2018    and Urine Culture    Lab Results   Component Value Date    LABURIN  09/17/2018     PSEUDOMONAS AERUGINOSA  >100,000 cfu/ml  No other significant isolate         Pending Diagnostic Studies:     None        Final Active Diagnoses:    Diagnosis Date Noted POA    Back pain with sciatica [M54.30, M54.9] 09/23/2018 Yes    Benign prostatic hyperplasia with urinary retention [N40.1, R33.8] 09/18/2018 Yes    Smoker [F17.200] 09/06/2018 Yes    Obesity, Class I, BMI 30-34.9 [E66.9] 09/06/2018 Yes    Incisional hernia, without obstruction or gangrene [K43.2] 09/05/2018 Yes    History of hepatitis C; S/p RX with SVR 23 documented 07/2017 [Z86.19] 07/18/2017 Yes    Essential hypertension [I10] 09/22/2016 Yes     Chronic    DM type 2 with diabetic peripheral neuropathy [E11.42] 09/22/2016 Yes     Chronic    HLD (hyperlipidemia) [E78.5] 09/22/2016 Yes     Chronic      Problems Resolved During this Admission:    Diagnosis Date Noted Date Resolved POA    PRINCIPAL PROBLEM:   Urinary tract infection associated with indwelling urethral catheter [T83.511A, N39.0] 09/18/2018 09/24/2018 Yes    Hyperkalemia [E87.5] 09/22/2018 09/24/2018 No    Other constipation [K59.09] 09/22/2018 09/24/2018 Yes    Sepsis secondary to UTI [A41.9, N39.0]  09/19/2018 Yes    Sepsis [A41.9] 09/17/2018 09/19/2018 Yes      Discharged Condition: good    Disposition: Home or Self Care    Follow Up:  Follow-up Information     Martha Jamison MD On 9/27/2018.    Specialty:  Internal Medicine  Why:  at 3:00 pm, Primary Care Physician  Contact information:  [address]  [address]  [facility] [address]  [phone]             Rossy Herndon MD On 10/1/2018.    Specialty:  Urology  Why:  1:20 pm--Urology Follow-Up  Contact information:  [address]  [address]  [facility] [address]  [phone]             [facility].    Specialty:  DME Provider  Why:  [employer]  Contact information:  [address] [facility]  [address]  [facility] [address]  [phone]             Ariella Lerner DO On 10/1/2018.    Specialties:  General Surgery, Surgery  Contact information:  [address]   [address]  [facility] [address]  [phone]                 Patient Instructions:      Diet diabetic     Lifting restrictions   Order Comments: Continue lifting restrictions per instructions given after hernia surgery.     Notify your health care provider if you experience any of the following:  temperature >100.4     Notify your health care provider if you experience any of the following:  persistent nausea and vomiting or diarrhea     Notify your health care provider if you experience any of the following:  severe uncontrolled pain     Notify your health care provider if you experience any of the following:  redness, tenderness, or signs of infection (pain, swelling, redness, odor or green/yellow discharge around incision site)     Notify your health care provider if you experience any of the  "following:  difficulty breathing or increased cough     Notify your health care provider if you experience any of the following:  severe persistent headache     Notify your health care provider if you experience any of the following:  worsening rash     Notify your health care provider if you experience any of the following:  persistent dizziness, light-headedness, or visual disturbances     Notify your health care provider if you experience any of the following:  increased confusion or weakness     No dressing needed     Medications:  Reconciled Home Medications:      Medication List      START taking these medications    ciprofloxacin HCl 500 MG tablet  Commonly known as:  CIPRO  Take 1 tablet (500 mg total) by mouth every 12 (twelve) hours. for 6 days     finasteride 5 mg tablet  Commonly known as:  PROSCAR  Take 1 tablet (5 mg total) by mouth once daily.        CHANGE how you take these medications    insulin glargine 100 unit/mL injection  Commonly known as:  LANTUS  Inject 15 Units into the skin every evening.  What changed:  how much to take        CONTINUE taking these medications    amLODIPine 10 MG tablet  Commonly known as:  NORVASC  Take 1 tablet (10 mg total) by mouth once daily.     aspirin 325 MG EC tablet  Commonly known as:  ECOTRIN  Take 325 mg by mouth once daily.     BD ULTRA-FINE SHORT PEN NEEDLE 31 gauge x 5/16" Ndle  Generic drug:  pen needle, diabetic     fluticasone 50 mcg/actuation nasal spray  Commonly known as:  FLONASE  1 spray (50 mcg total) by Each Nare route once daily.     glimepiride 4 MG tablet  Commonly known as:  AMARYL  Take 4 mg by mouth once daily.     insulin syringe-needle U-100 0.3 mL 30 gauge x 5/16 Syrg     meclizine 25 mg tablet  Commonly known as:  ANTIVERT  Take 1 tablet (25 mg total) by mouth 3 (three) times daily as needed for Dizziness.     oxyCODONE-acetaminophen 5-325 mg per tablet  Commonly known as:  PERCOCET  Take 1 tablet by mouth every 4 (four) hours as " needed for Pain.     polyethylene glycol 17 gram Pwpk  Commonly known as:  GLYCOLAX  Take 17 g by mouth once daily.     senna-docusate 8.6-50 mg 8.6-50 mg per tablet  Commonly known as:  PERICOLACE  Take 2 tablets by mouth 2 (two) times daily.     simvastatin 40 MG tablet  Commonly known as:  ZOCOR  Take 1 tablet (40 mg total) by mouth every evening.     SITagliptin 100 MG Tab  Commonly known as:  JANUVIA  Take 1 tablet (100 mg total) by mouth once daily.     tamsulosin 0.4 mg Cap  Commonly known as:  FLOMAX  Take 1 capsule (0.4 mg total) by mouth every evening.            Mike Pantoja Jr, MD  General Surgery  [facility]

## 2018-09-24 NOTE — TELEPHONE ENCOUNTER
----- Message from Dom Campbell PA-C sent at 9/21/2018  4:47 PM CDT -----  His U/S showed a new spot in the liver. He is currently in hospital. Can we set him up for a follow up visit in 2-4 weeks to discuss his U/S. Can we recall for an U/S in 3 months in case he gets lost to f/u  Thanks?

## 2018-09-24 NOTE — TELEPHONE ENCOUNTER
----- Message from Dom Campbell PA-C sent at 9/21/2018  4:48 PM CDT -----  Liver labs are stable

## 2018-09-24 NOTE — NURSING
Discharge discussed with pt. Pt acknowledged understanding of discharge medications and follow up appts.  Pt meds sent to his pharmacy in [facility]. Pt with  0 distress at this time Flu vaccine given in  Left deltoid..Denies pain or discomfort upon discharge. Hospital transport called for assistance.

## 2018-09-24 NOTE — PT/OT/SLP PROGRESS
Physical Therapy      Patient Name:  Jose Roca   MRN:  [id number]    Patient getting ready to eat lunch, 1st attempt (1145).  2nd attempt, 1220, pt on b/s commode with CNA present. Will follow-up later if time permits.    Cristina Martinez, PTA

## 2018-09-24 NOTE — PROGRESS NOTES
[facility]  General Surgery    CC: UTI    78 y.o. male with h/o DM, HTN, HLD s/p combo lap/open incisional hernia repairs on 9/5/18 at [facility] and acute urinary retention s/p multiple failed voiding trials likely from BPH. Developed suspected UTI, leukocytosis, diarrhea and malaise. Diarrhea resolved but leukocytosis from UTI persisted therefore changed from Rocephin to Zosyn.    Interval History: Patient states he is feeling good. His hutton was removed yesterday and he was able to void. He is now having no issues with urinating. He was changed to PO Cipro from IV Zosyn and remained AF with a normal WBC. He is sitting in a chair this morning and states when he gets home he wants to get his cane and go visit friends around the neighborhood. He is tolerating more PO.     Patient denies fevers, chills, current nausea or vomiting, chest pain, shortness of breath, diarrhea, dysuria    Temp:  [97.4 °F (36.3 °C)-98.9 °F (37.2 °C)] 97.4 °F (36.3 °C)  Pulse:  [78-86] 81  Resp:  [16-18] 17  SpO2:  [96 %-98 %] 96 %  BP: (127-144)/(71-83) 144/83    GEN: NAD, laying in bed, AAOx3  HEENT: Anicteric  CV: RRR  RESP: NLB, no distress, no wheezes  ABD: Soft, NT, ND, well healed incisions w/o erythema or breakdown, no rebound, no guarding  EXT: No edema  SKIN: Warm, no diaphoretic  PYSCH: Nml modd and effect    I/O last 3 completed shifts:  In: 920 [P.O.:920]  Out: 3550 [Urine:3550]    LABS  WBC  9.6  H/H 16.3/50.4        K 4.6    CO2 20  BUN 9  CR 1.4      PHOS 3.2  MG 2.1    UCX - PSEUDOMONAS AERUGINOSA - pan sensitive  BCX - NGTD  Stool CX - Nothing significant to date  C.Diff - canceled    Assessment and Plan  77 y/o BM s/p combo lap and open incisional hernia repair, urinary retention from suspected prostatomegaly and pre-existing conditions requiring indwelling catheter, UTI    UTI with indwelling catheter  - Pseudomonas species was found to be pansensitive however Rocephin was not included.  Changed to Zosyn with improvement in WBC. Changed to PO Cipro yesterday and remained AF with nml WBC  - Continue Cipro for 7 days total  - Successfully voided after hutton removed yesterday therefore no longer has indwelling catheter.    BPH with Urinary Retention  - Urology consulted, added Finasteride in addition Flomax  - Voiding trial yesterday was successful and hutton catheter removed  - Denies current issues with voiding, continue Finasteride and Flomax    Diarrhea  - Resolved since admit  - Cancel C.diff and contact precautions    DM  - POCT glucose, SSI, long acting QHS, diabetic diet    HTN  - Home meds    Hypophosphatemia   - Resolved    Hyperkalemia  - Likely lab error  - K nml today    Encourage PT, OOBC, ambulation - home health consult placed for outpatient physical therapy.  Lovenox    D/c today with family. Will arrange temporary HH 2-3 times per week to assess patient, assure he is ambulation and taking medication as prescribed    Mike Pantoja Jr

## 2018-09-24 NOTE — PLAN OF CARE
Problem: Patient Care Overview  Goal: Plan of Care Review  Outcome: Ongoing (interventions implemented as appropriate)  Telemetry with NSR 1st AV block. Fall precautions maintained.  Bed alarm on.  No elevated temperature this shift, on oral antibiotics.  WBC 9.61.  Voiding without difficulty.  Blood sugar 188 last night, no sliding scale coverage need.  For discharge today.

## 2018-09-24 NOTE — PLAN OF CARE
Patient is discharged to home with home health. TN notified Tracy with [facility]. She will set up patient with a home health company.     --Update: Tracy left message to stated patient is set up with [facility].    TN met with patient, he is still refusing PCC follow-up. I informed him the importance of going to follow-up appointments as scheduled. Patient's family will be here at 1:00 pm, to transport home. I informed nurse Mary Beth and patient.    TN reviewed printed follow-up information with patient. He is aware of all follow-up appointments scheduled.--TN did call to see if Dr. Lerner had any sooner follow-ups in [place]. However, no sooner follow-ups found.     Patient did not want bedside delivery. Medications were called into his preferred pharmacy in [facility].    Future Appointments   Date Time Provider Department Center   10/1/2018  1:20 PM Rossy Herndon MD [facility] UROLOGY [place] [facility]   10/5/2018 10:00 AM Ariella Lerner DO [facility] GEN [facility]   10/11/2018  9:20 AM Dom Campbell PA-C [facility] HEPAT [address]     Follow-up With  Details  Why  Contact Info   Martha Jamison MD  On 9/27/2018  at 3:00 pm, Primary Care Physician  [address]  [address]  [facility] PRIMARY CARE  [facility] [address]  [phone]   Rossy Herndon MD  On 10/1/2018  1:20 pm--Urology Follow-Up  [address]  [address]  [facility] [address]  [phone]   [facility] [place]    [facility]  [address] [facility]  [address]  [facility] [address]  [phone]   Ariella Lerner DO  On 10/5/2018  at 10:00 am--Hospital Follow-Up--They will call you if they have something sooner.  [address]   [address]  [facility] [address]  [phone]        09/24/18 1138   Final Note   Assessment Type Final Discharge Note   Discharge Disposition Home-Health   What phone number can be called within the next 1-3 days to see how you are doing after discharge? [phone]   Hospital Follow Up  Appt(s) scheduled?  Yes   Discharge plans and expectations educations in teach back method with documentation complete? Yes   Right Care Referral Info   Post Acute Recommendation Home-care   Referral Type Home Health   Facility Name [facility]     Caprice Lucas RN  Transition Navigator  [phone]

## 2018-09-25 NOTE — PT/OT/SLP DISCHARGE
Physical Therapy Discharge Summary    Name: Jose Roca  MRN: [id number]   Principal Problem: Urinary tract infection associated with indwelling urethral catheter     Patient Discharged from acute Physical Therapy on 18.  Please refer to prior PT noted date on 18 for functional status.     Assessment:     Patient appropriate for care in another setting.    Objective:     GOALS:   Multidisciplinary Problems     Physical Therapy Goals     Not on file          Multidisciplinary Problems (Resolved)        Problem: Physical Therapy Goal    Goal Priority Disciplines Outcome Goal Variances Interventions   Physical Therapy Goal   (Resolved)     PT, PT/OT Outcome(s) achieved     Description:  Goals to be met by: 10/20/2018     Patient will increase functional independence with mobility by performin. Supine to sit with Modified [place]  2. Sit to stand transfer with Modified [place]  3. Gait  x 150 feet with Modified [place] using Rolling Walker.                       Reasons for Discontinuation of Therapy Services  Transfer to alternate level of care.      Plan:     Patient Discharged to: Home with Home Health Service.    Patt Easton, PT  2018

## 2018-10-09 ENCOUNTER — DOCUMENTATION ONLY (OUTPATIENT)
Dept: UROLOGY | Facility: CLINIC | Age: 78
End: 2018-10-09

## 2018-10-09 NOTE — PROGRESS NOTES
Called to check on patient.  He is still not ambulating due to weakness.  Offered to schedule follow up appointment for urinary retention.  He informed me he could not make it and is still in the bed.

## 2018-10-11 ENCOUNTER — OFFICE VISIT (OUTPATIENT)
Dept: HEPATOLOGY | Facility: CLINIC | Age: 78
End: 2018-10-11
Payer: MEDICARE

## 2018-10-11 ENCOUNTER — LAB VISIT (OUTPATIENT)
Dept: LAB | Facility: HOSPITAL | Age: 78
End: 2018-10-11
Payer: MEDICARE

## 2018-10-11 VITALS
OXYGEN SATURATION: 99 % | DIASTOLIC BLOOD PRESSURE: 84 MMHG | SYSTOLIC BLOOD PRESSURE: 140 MMHG | WEIGHT: 188.69 LBS | BODY MASS INDEX: 27.95 KG/M2 | RESPIRATION RATE: 18 BRPM | TEMPERATURE: 98 F | HEIGHT: 69 IN

## 2018-10-11 DIAGNOSIS — R16.0 LIVER MASS: ICD-10-CM

## 2018-10-11 DIAGNOSIS — K74.60 CIRRHOSIS OF LIVER WITHOUT ASCITES, UNSPECIFIED HEPATIC CIRRHOSIS TYPE: ICD-10-CM

## 2018-10-11 DIAGNOSIS — R16.0 LIVER MASS: Primary | ICD-10-CM

## 2018-10-11 DIAGNOSIS — Z86.19 HISTORY OF HEPATITIS C: ICD-10-CM

## 2018-10-11 LAB
ALBUMIN SERPL BCP-MCNC: 3.3 G/DL
ALP SERPL-CCNC: 92 U/L
ALT SERPL W/O P-5'-P-CCNC: 13 U/L
ANION GAP SERPL CALC-SCNC: 9 MMOL/L
AST SERPL-CCNC: 22 U/L
BILIRUB SERPL-MCNC: 1.6 MG/DL
BUN SERPL-MCNC: 18 MG/DL
CALCIUM SERPL-MCNC: 9.7 MG/DL
CHLORIDE SERPL-SCNC: 102 MMOL/L
CO2 SERPL-SCNC: 25 MMOL/L
CREAT SERPL-MCNC: 1.8 MG/DL
EST. GFR  (AFRICAN AMERICAN): 41.1 ML/MIN/1.73 M^2
EST. GFR  (NON AFRICAN AMERICAN): 35.5 ML/MIN/1.73 M^2
GLUCOSE SERPL-MCNC: 76 MG/DL
POTASSIUM SERPL-SCNC: 4.5 MMOL/L
PROT SERPL-MCNC: 8.1 G/DL
SODIUM SERPL-SCNC: 136 MMOL/L

## 2018-10-11 PROCEDURE — 36415 COLL VENOUS BLD VENIPUNCTURE: CPT

## 2018-10-11 PROCEDURE — 3079F DIAST BP 80-89 MM HG: CPT | Mod: CPTII,,, | Performed by: PHYSICIAN ASSISTANT

## 2018-10-11 PROCEDURE — 99999 PR PBB SHADOW E&M-EST. PATIENT-LVL V: CPT | Mod: PBBFAC,,, | Performed by: PHYSICIAN ASSISTANT

## 2018-10-11 PROCEDURE — 99214 OFFICE O/P EST MOD 30 MIN: CPT | Mod: S$PBB,,, | Performed by: PHYSICIAN ASSISTANT

## 2018-10-11 PROCEDURE — 1100F PTFALLS ASSESS-DOCD GE2>/YR: CPT | Mod: CPTII,,, | Performed by: PHYSICIAN ASSISTANT

## 2018-10-11 PROCEDURE — 80053 COMPREHEN METABOLIC PANEL: CPT

## 2018-10-11 PROCEDURE — 3288F FALL RISK ASSESSMENT DOCD: CPT | Mod: CPTII,,, | Performed by: PHYSICIAN ASSISTANT

## 2018-10-11 PROCEDURE — 99215 OFFICE O/P EST HI 40 MIN: CPT | Mod: PBBFAC | Performed by: PHYSICIAN ASSISTANT

## 2018-10-11 PROCEDURE — 3077F SYST BP >= 140 MM HG: CPT | Mod: CPTII,,, | Performed by: PHYSICIAN ASSISTANT

## 2018-10-11 RX ORDER — GABAPENTIN 300 MG/1
300 CAPSULE ORAL NIGHTLY
Status: ON HOLD | COMMUNITY
Start: 2018-09-27 | End: 2018-11-26 | Stop reason: HOSPADM

## 2018-10-11 NOTE — PROGRESS NOTES
"HEPATOLOGY CLINIC VISIT NOTE - HCV clinic    REFERRING PROVIDER: referred while IP     CHIEF COMPLAINT: Hepatitis C - discuss treatment    HISTORY: This is a 78 y.o. Black or  male with advanced fibrosis here for follow up. He went for routine HCC screening 09/2018 and 2 new subcentimeter focus were noted in the liver. He was then hospitalized for a period of time and is now discharged and here to discuss his results. His AFP is WNL and remains unchanged.    His fibrosis is attributable to HCV. He is s/p treatment and achieved SVR.     He reports he still feels weak following bout of sepsis. "I'm moving like a frog, I have no swag" He had severe urinary retention during IP requiring hutton. When asked how's your urine- "I could put out a fire."    He had recent hernia repair. Denies signs of hepatic decompensation.       Liver staging:  Fibroscan F3 at 9.9 kPa  Normal transaminases  Mild hypoalbuminemia previously normal  Mild tbili elevation  PLTs >150     ADVANCED FIBROSIS history   - Well compensated    - HCC screening:   - U/S: due 03/2019- will need sooner interval.    - AFP: WNL    (?)Portal HTN:   - EGD: ordered, not done        Denies jaundice, dark urine, abdominal distention, hematemesis, melena, slowed mentation.   No abnormal skin rashes. No generalized joint pain.                     Past Medical History:   Diagnosis Date    Diabetes mellitus, type 2     High cholesterol     History of hepatitis C; S/p RX with SVR 23 documented 07/2017 7/18/2017    Hypertension          Past Surgical History:   Procedure Laterality Date    CHOLECYSTECTOMY      CORONARY STENT PLACEMENT      EXPLORATORY LAPAROTOMY W/ BOWEL RESECTION  09/19/2016    ileocecectomy    EXPLORATORY-LAPAROTOMY N/A 9/19/2016    [facility] OR    ILEOCECECTOMY  9/19/2016    [facility] OR    LYSIS OF ADHESIONS N/A 9/5/2018    Procedure: LYSIS, ADHESIONS;  Surgeon: " Ariella Lerner DO;  Location: [facility] OR;  Service: General;  Laterality: N/A;    LYSIS, ADHESIONS N/A 9/5/2018    [facility] at [facility] OR    OMENTECTOMY N/A 9/5/2018    Procedure: OMENTECTOMY;  Surgeon: Ariella Lerner DO;  Location: [facility] OR;  Service: General;  Laterality: N/A;    OMENTECTOMY N/A 9/5/2018    [facility] at [facility] OR    REPAIR,HERNIA,INCISIONAL OR VENTRAL,WITHOUT HISTORY OF PRIOR REPAIR(MIDLINE HERNIA DEFECT 6CMx3.6CM) (LEFT LATERAL HERNIA DEFECT 4.0OVt6UE) (HYBRID) N/A 9/5/2018    Performed by [facility]ella Lerner DO at [facility] OR     FAMILY HISTORY: Negative for liver disease    SOCIAL HISTORY:   Not   Social History     Tobacco Use   Smoking Status Current Every Day Smoker    Packs/day: 0.50    Types: Cigarettes   Smokeless Tobacco Never Used   Tobacco Comment    APPROX 3 CIGARETTES A DAY     Social History     Substance and Sexual Activity   Alcohol Use No   Denies     Social History     Substance and Sexual Activity   Drug Use Yes    Types: Marijuana    Comment: medical   ROS:   No fever, chills, fatigue  No chest pain, dyspnea, cough  No abdominal pain, nausea, vomiting  No lower extremity edema  No depression or anxiety      PHYSICAL EXAM:  Friendly Black or  male, in no acute distress; alert and oriented to person, place and time  VITALS: reviewed  HEENT: Sclerae anicteric.   NECK: Supple  LUNGS: Normal respiratory effort  ABDOMEN: Flat, soft, nontender. No organomegaly or masses. (+) large reducible abdominal hernias, no signs of strangulation or incarceration, No ascites appreciated on palpation or exam   SKIN: Warm and dry. No jaundice, No obvious rashes.   EXTREMITIES: No lower extremity edema  NEURO/PSYCH: Normal gate. Memory intact. Thought and speech pattern appropriate. Behavior normal. No depression or anxiety noted.    RECENT LABS:  Lab Results   Component Value Date    WBC 9.61 09/24/2018    HGB  16.3 09/24/2018     09/24/2018     Lab Results   Component Value Date    INR 1.1 09/13/2018     Lab Results   Component Value Date    AST 19 09/17/2018    ALT 13 09/17/2018    BILITOT 1.7 (H) 09/17/2018    ALBUMIN 2.7 (L) 09/17/2018    ALKPHOS 78 09/17/2018    CREATININE 1.4 09/24/2018    BUN 9 09/24/2018     (L) 09/24/2018    K 4.6 09/24/2018    AFP 2.4 09/13/2018     RECENT IMAGING:  US Abdomen Limited   Order: [id number]   Status:  Final result   Visible to patient:  No (Not Released) Next appt:  10/18/2018 at 10:15 AM in Radiology ([facility] CT1 LIMIT 350 LBS) Dx:  History of hepatitis C; Cirrhosis of ...   Details     Reading Physician Reading Date Result Priority   ANDRIA Olivas Sr., MD 9/13/2018       Narrative     EXAMINATION:  US ABDOMEN LIMITED    CLINICAL HISTORY:  [facility];    TECHNIQUE:  Multiple static ultrasound images are submitted for interpretation with color flow and spectral Doppler imaging.    FINDINGS:  Comparison was made to a CT examination of the abdomen performed on 08/16/2018.  This is a limited examination secondary to overlying bowel gas.  The pancreas was not visualized secondary to the overlying bowel gas.  The liver measures 11.0 cm in length.  There is a 13 mm oval-shaped hyperechoic area in the left lobe of the liver.  There is an 8 mm oval-shaped hyperechoic area in the right lobe of the liver.  There is no intrahepatic biliary ductal dilatation. The common bile duct has a diameter of 4 mm. The gallbladder is absent.  The right kidney is normal in appearance.  It measures 9.7 cm in length.  The visualized portion of the abdominal aorta is normal in appearance.  It has an AP diameter 2.4 cm.  The main portal vein has a normal venous waveform with flow directed towards the liver. It has a velocity of 25 cm/sec.      Impression       1. This is a limited examination secondary to overlying bowel gas. The pancreas was not visualized secondary  to the overlying bowel gas.  2. There is a 13 mm oval-shaped hyperechoic area in the left lobe of the liver. There is an 8 mm oval-shaped hyperechoic area in the right lobe of the liver.  If additional imaging evaluation is clinically indicated, I recommend consideration of an MRI examination of the liver without and with IV contrast.               ASSESSMENT  78 y.o. Black or  male with:  1. ADVANCED FIBROSIS/?CIRRHOSIS   -- fibroscan F3, now with trace ascites, albumin 3.5  -- Well compensated  - HCC screening:   - U/S: due 03/2019- will need sooner interval.    - AFP: WNL    (?)Portal HTN:   - EGD: ordered, not done      2. H/O HCV  -- s/p treatment w/ Harvoni  -- SVR 23 achieved  -- (+) immunity to HAV and HBV    3. LIVER MASS  -- AFP stable  -- radiology recommended MRI, however has retained bullets. Has not had CT with contrast so will order tpCT.       EDUCATION:  Discussed evidence that indicates that pt has cirrhosis.   -- Discussed the basics about liver fibrosis /scarring and how that relates to liver function. Reviewed the significance of the MELD scoring system as it relates to indication for transplant.  -- Signs and symptoms of hepatic decompensation were reviewed, including jaundice, ascites, and slowed mentation due to hepatic encephalopathy. The patient should seek medical attention if any of these things occur. We discussed the potential for bleeding from esophageal varices with symptoms of hematemesis and melena. The patient should report to the Emergency Department for these symptoms.   -- We discussed the increased risk of hepatocellular carcinoma due to cirrhosis.   Continued screening every six months with ultrasound and AFP is recommended.   -- Discussed portal hypertension, including potential development of esophageal varices. Role of EGD in screening for varices was reviewed.   Cirrhosis education booklet given to pt    Recommended patient avoid alcohol and raw seafood.  Limit tylenol to 2000mg daily      PLAN:  1. Labs today  2. CT scan  3. F/u TBD pending imaging    Dom Campbell PA-C

## 2018-10-15 ENCOUNTER — TELEPHONE (OUTPATIENT)
Dept: HEPATOLOGY | Facility: CLINIC | Age: 78
End: 2018-10-15

## 2018-10-15 DIAGNOSIS — N17.9 AKI (ACUTE KIDNEY INJURY): Primary | ICD-10-CM

## 2018-10-15 NOTE — TELEPHONE ENCOUNTER
----- Message from Dom Campbell PA-C sent at 10/15/2018 11:17 AM CDT -----  His kidney function is a little higher than I would like for him to do a CT. Can we cancel CT, have him hydrate and repeat this week? Please cancel CT and schedule BMP some day this week. Please advise patient to increase his water intake

## 2018-10-18 ENCOUNTER — LAB VISIT (OUTPATIENT)
Dept: LAB | Facility: HOSPITAL | Age: 78
End: 2018-10-18
Attending: PHYSICIAN ASSISTANT
Payer: MEDICARE

## 2018-10-18 DIAGNOSIS — N17.9 AKI (ACUTE KIDNEY INJURY): ICD-10-CM

## 2018-10-18 LAB
ANION GAP SERPL CALC-SCNC: 5 MMOL/L
BUN SERPL-MCNC: 19 MG/DL
CALCIUM SERPL-MCNC: 9 MG/DL
CHLORIDE SERPL-SCNC: 103 MMOL/L
CO2 SERPL-SCNC: 29 MMOL/L
CREAT SERPL-MCNC: 1.7 MG/DL
EST. GFR  (AFRICAN AMERICAN): 44 ML/MIN/1.73 M^2
EST. GFR  (NON AFRICAN AMERICAN): 38.1 ML/MIN/1.73 M^2
GLUCOSE SERPL-MCNC: 61 MG/DL
POTASSIUM SERPL-SCNC: 4.4 MMOL/L
SODIUM SERPL-SCNC: 137 MMOL/L

## 2018-10-18 PROCEDURE — 36415 COLL VENOUS BLD VENIPUNCTURE: CPT | Mod: PO

## 2018-10-18 PROCEDURE — 80048 BASIC METABOLIC PNL TOTAL CA: CPT | Mod: PO

## 2018-10-29 DIAGNOSIS — R16.0 LIVER MASS: Primary | ICD-10-CM

## 2018-10-30 ENCOUNTER — TELEPHONE (OUTPATIENT)
Dept: HEPATOLOGY | Facility: CLINIC | Age: 78
End: 2018-10-30

## 2018-10-30 NOTE — TELEPHONE ENCOUNTER
----- Message from Dom Campbell PA-C sent at 10/29/2018 11:30 AM CDT -----  His kidney function is still fairly high. I don't want to stress his kidneys at this point with contrast. Instead, I will do a short interval U/S. Please schedule him for U/S 11/2018 to monitor for stability.   Thanks

## 2018-11-01 ENCOUNTER — TELEPHONE (OUTPATIENT)
Dept: HEPATOLOGY | Facility: CLINIC | Age: 78
End: 2018-11-01

## 2018-11-05 ENCOUNTER — TELEPHONE (OUTPATIENT)
Dept: HEPATOLOGY | Facility: CLINIC | Age: 78
End: 2018-11-05

## 2018-11-05 NOTE — TELEPHONE ENCOUNTER
MA attempted to call patient again to schedule his US he is unable to reached again left him VM to please give us a callback.

## 2018-11-07 ENCOUNTER — TELEPHONE (OUTPATIENT)
Dept: HEPATOLOGY | Facility: CLINIC | Age: 78
End: 2018-11-07

## 2018-11-07 NOTE — TELEPHONE ENCOUNTER
NO return phone calls from this patient schedule his US 11/29 at [facility]. Mailed appt reminder to patient.

## 2018-11-07 NOTE — TELEPHONE ENCOUNTER
----- Message from Germania Hickey MA sent at 11/5/2018  1:43 PM CST -----      ----- Message -----  From: Dom Campbell PA-C  Sent: 10/29/2018  11:30 AM  To: [facility] Hepat Non-Clinical Staff    His kidney function is still fairly high. I don't want to stress his kidneys at this point with contrast. Instead, I will do a short interval U/S. Please schedule him for U/S 11/2018 to monitor for stability.   Thanks

## 2018-11-22 ENCOUNTER — HOSPITAL ENCOUNTER (INPATIENT)
Facility: HOSPITAL | Age: 78
LOS: 4 days | Discharge: HOME HEALTH | DRG: 639 | End: 2018-11-26
Attending: EMERGENCY MEDICINE | Admitting: INTERNAL MEDICINE
Payer: MEDICARE

## 2018-11-22 DIAGNOSIS — E16.2 HYPOGLYCEMIA: ICD-10-CM

## 2018-11-22 DIAGNOSIS — E11.42 DM TYPE 2 WITH DIABETIC PERIPHERAL NEUROPATHY: Chronic | ICD-10-CM

## 2018-11-22 DIAGNOSIS — R79.89 ELEVATED TROPONIN: ICD-10-CM

## 2018-11-22 DIAGNOSIS — R53.1 WEAKNESS: ICD-10-CM

## 2018-11-22 DIAGNOSIS — R80.9 PROTEINURIA, UNSPECIFIED TYPE: ICD-10-CM
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
## 2018-11-22 DIAGNOSIS — I10 ESSENTIAL HYPERTENSION: Chronic | ICD-10-CM
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
## 2018-11-22 PROBLEM — N18.30 CKD (CHRONIC KIDNEY DISEASE) STAGE 3, GFR 30-59 ML/MIN: Status: ACTIVE | Noted: 2018-11-22

## 2018-11-22 LAB
ALBUMIN SERPL BCP-MCNC: 3.4 G/DL
ALP SERPL-CCNC: 70 U/L
ALT SERPL W/O P-5'-P-CCNC: 24 U/L
ANION GAP SERPL CALC-SCNC: 8 MMOL/L
APTT BLDCRRT: 32.3 SEC
AST SERPL-CCNC: 34 U/L
BACTERIA #/AREA URNS HPF: ABNORMAL /HPF
BASOPHILS # BLD AUTO: 0.01 K/UL
BASOPHILS NFR BLD: 0.1 %
BILIRUB SERPL-MCNC: 1.9 MG/DL
BILIRUB UR QL STRIP: NEGATIVE
BNP SERPL-MCNC: 489 PG/ML
BUN SERPL-MCNC: 16 MG/DL
CALCIUM SERPL-MCNC: 8.8 MG/DL
CHLORIDE SERPL-SCNC: 101 MMOL/L
CLARITY UR: CLEAR
CO2 SERPL-SCNC: 24 MMOL/L
COLOR UR: YELLOW
CREAT SERPL-MCNC: 1.6 MG/DL
CREAT UR-MCNC: 56.8 MG/DL
DIFFERENTIAL METHOD: ABNORMAL
EOSINOPHIL # BLD AUTO: 0.1 K/UL
EOSINOPHIL NFR BLD: 1.2 %
ERYTHROCYTE [DISTWIDTH] IN BLOOD BY AUTOMATED COUNT: 14.8 %
EST. GFR  (AFRICAN AMERICAN): 47 ML/MIN/1.73 M^2
EST. GFR  (NON AFRICAN AMERICAN): 41 ML/MIN/1.73 M^2
ESTIMATED AVG GLUCOSE: 111 MG/DL
FLUAV AG SPEC QL IA: NEGATIVE
FLUBV AG SPEC QL IA: NEGATIVE
GLUCOSE SERPL-MCNC: 65 MG/DL
GLUCOSE UR QL STRIP: NEGATIVE
HBA1C MFR BLD HPLC: 5.5 %
HCT VFR BLD AUTO: 39.4 %
HGB BLD-MCNC: 12.8 G/DL
HGB UR QL STRIP: ABNORMAL
HYALINE CASTS #/AREA URNS LPF: 0 /LPF
INR PPP: 1
KETONES UR QL STRIP: NEGATIVE
LACTATE SERPL-SCNC: 0.9 MMOL/L
LEUKOCYTE ESTERASE UR QL STRIP: NEGATIVE
LIPASE SERPL-CCNC: 20 U/L
LYMPHOCYTES # BLD AUTO: 0.5 K/UL
LYMPHOCYTES NFR BLD: 7.3 %
MAGNESIUM SERPL-MCNC: 1.7 MG/DL
MCH RBC QN AUTO: 28.7 PG
MCHC RBC AUTO-ENTMCNC: 32.5 G/DL
MCV RBC AUTO: 88 FL
MICROSCOPIC COMMENT: ABNORMAL
MONOCYTES # BLD AUTO: 1.1 K/UL
MONOCYTES NFR BLD: 15.3 %
NEUTROPHILS # BLD AUTO: 5.5 K/UL
NEUTROPHILS NFR BLD: 76 %
NITRITE UR QL STRIP: NEGATIVE
PH UR STRIP: 6 [PH] (ref 5–8)
PHOSPHATE SERPL-MCNC: 1.1 MG/DL
PLATELET # BLD AUTO: 189 K/UL
PMV BLD AUTO: 9.2 FL
POCT GLUCOSE: 150 MG/DL (ref 70–110)
POCT GLUCOSE: 37 MG/DL (ref 70–110)
POCT GLUCOSE: 37 MG/DL (ref 70–110)
POCT GLUCOSE: 80 MG/DL (ref 70–110)
POTASSIUM SERPL-SCNC: 3.7 MMOL/L
PROCALCITONIN SERPL IA-MCNC: 0.15 NG/ML
PROT SERPL-MCNC: 7.6 G/DL
PROT UR QL STRIP: ABNORMAL
PROT UR-MCNC: 520 MG/DL
PROT/CREAT UR: 9.15 MG/G{CREAT}
PROTHROMBIN TIME: 10.7 SEC
RBC # BLD AUTO: 4.46 M/UL
RBC #/AREA URNS HPF: 15 /HPF (ref 0–4)
SODIUM SERPL-SCNC: 133 MMOL/L
SP GR UR STRIP: 1.02 (ref 1–1.03)
SPECIMEN SOURCE: NORMAL
TROPONIN I SERPL DL<=0.01 NG/ML-MCNC: 0.05 NG/ML
TROPONIN I SERPL DL<=0.01 NG/ML-MCNC: 0.05 NG/ML
URN SPEC COLLECT METH UR: ABNORMAL
UROBILINOGEN UR STRIP-ACNC: NEGATIVE EU/DL
WBC # BLD AUTO: 7.26 K/UL
WBC #/AREA URNS HPF: 5 /HPF (ref 0–5)

## 2018-11-22 PROCEDURE — 81000 URINALYSIS NONAUTO W/SCOPE: CPT

## 2018-11-22 PROCEDURE — 82962 GLUCOSE BLOOD TEST: CPT

## 2018-11-22 PROCEDURE — 84681 ASSAY OF C-PEPTIDE: CPT

## 2018-11-22 PROCEDURE — 83880 ASSAY OF NATRIURETIC PEPTIDE: CPT

## 2018-11-22 PROCEDURE — 83735 ASSAY OF MAGNESIUM: CPT

## 2018-11-22 PROCEDURE — 99285 EMERGENCY DEPT VISIT HI MDM: CPT | Mod: 25

## 2018-11-22 PROCEDURE — 93005 ELECTROCARDIOGRAM TRACING: CPT

## 2018-11-22 PROCEDURE — 87400 INFLUENZA A/B EACH AG IA: CPT

## 2018-11-22 PROCEDURE — 85025 COMPLETE CBC W/AUTO DIFF WBC: CPT

## 2018-11-22 PROCEDURE — 83036 HEMOGLOBIN GLYCOSYLATED A1C: CPT

## 2018-11-22 PROCEDURE — 84484 ASSAY OF TROPONIN QUANT: CPT | Mod: 91

## 2018-11-22 PROCEDURE — 85610 PROTHROMBIN TIME: CPT

## 2018-11-22 PROCEDURE — G0378 HOSPITAL OBSERVATION PER HR: HCPCS

## 2018-11-22 PROCEDURE — A4216 STERILE WATER/SALINE, 10 ML: HCPCS | Performed by: STUDENT IN AN ORGANIZED HEALTH CARE EDUCATION/TRAINING PROGRAM

## 2018-11-22 PROCEDURE — 80053 COMPREHEN METABOLIC PANEL: CPT

## 2018-11-22 PROCEDURE — 84145 PROCALCITONIN (PCT): CPT

## 2018-11-22 PROCEDURE — 83690 ASSAY OF LIPASE: CPT

## 2018-11-22 PROCEDURE — S5010 5% DEXTROSE AND 0.45% SALINE: HCPCS | Performed by: STUDENT IN AN ORGANIZED HEALTH CARE EDUCATION/TRAINING PROGRAM

## 2018-11-22 PROCEDURE — 11000001 HC ACUTE MED/SURG PRIVATE ROOM

## 2018-11-22 PROCEDURE — 82570 ASSAY OF URINE CREATININE: CPT

## 2018-11-22 PROCEDURE — 63600175 PHARM REV CODE 636 W HCPCS: Performed by: STUDENT IN AN ORGANIZED HEALTH CARE EDUCATION/TRAINING PROGRAM

## 2018-11-22 PROCEDURE — 84100 ASSAY OF PHOSPHORUS: CPT

## 2018-11-22 PROCEDURE — 93010 ELECTROCARDIOGRAM REPORT: CPT | Mod: ,,, | Performed by: INTERNAL MEDICINE

## 2018-11-22 PROCEDURE — 25000003 PHARM REV CODE 250: Performed by: EMERGENCY MEDICINE

## 2018-11-22 PROCEDURE — 87040 BLOOD CULTURE FOR BACTERIA: CPT | Mod: 59

## 2018-11-22 PROCEDURE — 96361 HYDRATE IV INFUSION ADD-ON: CPT

## 2018-11-22 PROCEDURE — 25000003 PHARM REV CODE 250: Performed by: STUDENT IN AN ORGANIZED HEALTH CARE EDUCATION/TRAINING PROGRAM

## 2018-11-22 PROCEDURE — 83605 ASSAY OF LACTIC ACID: CPT

## 2018-11-22 PROCEDURE — 63600175 PHARM REV CODE 636 W HCPCS: Performed by: EMERGENCY MEDICINE

## 2018-11-22 PROCEDURE — 85730 THROMBOPLASTIN TIME PARTIAL: CPT

## 2018-11-22 PROCEDURE — 96365 THER/PROPH/DIAG IV INF INIT: CPT

## 2018-11-22 PROCEDURE — 84484 ASSAY OF TROPONIN QUANT: CPT

## 2018-11-22 RX ORDER — GLUCAGON 1 MG
1 KIT INJECTION
Status: DISCONTINUED | OUTPATIENT
Start: 2018-11-22 | End: 2018-11-26 | Stop reason: HOSPADM

## 2018-11-22 RX ORDER — ASPIRIN 325 MG
325 TABLET, DELAYED RELEASE (ENTERIC COATED) ORAL
Status: COMPLETED | OUTPATIENT
Start: 2018-11-22 | End: 2018-11-22

## 2018-11-22 RX ORDER — IPRATROPIUM BROMIDE AND ALBUTEROL SULFATE 2.5; .5 MG/3ML; MG/3ML
3 SOLUTION RESPIRATORY (INHALATION) EVERY 6 HOURS PRN
Status: DISCONTINUED | OUTPATIENT
Start: 2018-11-22 | End: 2018-11-26 | Stop reason: HOSPADM

## 2018-11-22 RX ORDER — TAMSULOSIN HYDROCHLORIDE 0.4 MG/1
0.4 CAPSULE ORAL NIGHTLY
Status: DISCONTINUED | OUTPATIENT
Start: 2018-11-22 | End: 2018-11-22

## 2018-11-22 RX ORDER — SODIUM CHLORIDE 0.9 % (FLUSH) 0.9 %
3 SYRINGE (ML) INJECTION EVERY 8 HOURS
Status: DISCONTINUED | OUTPATIENT
Start: 2018-11-22 | End: 2018-11-26 | Stop reason: HOSPADM

## 2018-11-22 RX ORDER — LOSARTAN POTASSIUM 50 MG/1
100 TABLET ORAL DAILY
Status: DISCONTINUED | OUTPATIENT
Start: 2018-11-23 | End: 2018-11-26 | Stop reason: HOSPADM

## 2018-11-22 RX ORDER — HEPARIN SODIUM 5000 [USP'U]/ML
5000 INJECTION, SOLUTION INTRAVENOUS; SUBCUTANEOUS EVERY 12 HOURS
Status: DISCONTINUED | OUTPATIENT
Start: 2018-11-22 | End: 2018-11-26 | Stop reason: HOSPADM

## 2018-11-22 RX ORDER — AMOXICILLIN 250 MG
2 CAPSULE ORAL 2 TIMES DAILY
Status: DISCONTINUED | OUTPATIENT
Start: 2018-11-22 | End: 2018-11-26 | Stop reason: HOSPADM

## 2018-11-22 RX ORDER — ASPIRIN 81 MG/1
81 TABLET ORAL DAILY
Status: DISCONTINUED | OUTPATIENT
Start: 2018-11-23 | End: 2018-11-26 | Stop reason: HOSPADM

## 2018-11-22 RX ORDER — POLYETHYLENE GLYCOL 3350 17 G/17G
17 POWDER, FOR SOLUTION ORAL DAILY
Status: DISCONTINUED | OUTPATIENT
Start: 2018-11-23 | End: 2018-11-26 | Stop reason: HOSPADM

## 2018-11-22 RX ORDER — AMLODIPINE BESYLATE 5 MG/1
10 TABLET ORAL DAILY
Status: DISCONTINUED | OUTPATIENT
Start: 2018-11-23 | End: 2018-11-22

## 2018-11-22 RX ORDER — HYDRALAZINE HYDROCHLORIDE 25 MG/1
25 TABLET, FILM COATED ORAL 3 TIMES DAILY PRN
Status: DISCONTINUED | OUTPATIENT
Start: 2018-11-22 | End: 2018-11-26 | Stop reason: HOSPADM

## 2018-11-22 RX ORDER — FINASTERIDE 5 MG/1
5 TABLET, FILM COATED ORAL DAILY
Status: DISCONTINUED | OUTPATIENT
Start: 2018-11-23 | End: 2018-11-22

## 2018-11-22 RX ORDER — DEXTROSE 50 % IN WATER (D50W) INTRAVENOUS SYRINGE
25
Status: COMPLETED | OUTPATIENT
Start: 2018-11-22 | End: 2018-11-22

## 2018-11-22 RX ORDER — RAMELTEON 8 MG/1
8 TABLET ORAL NIGHTLY PRN
Status: DISCONTINUED | OUTPATIENT
Start: 2018-11-22 | End: 2018-11-26 | Stop reason: HOSPADM

## 2018-11-22 RX ORDER — DEXTROSE MONOHYDRATE AND SODIUM CHLORIDE 5; .45 G/100ML; G/100ML
INJECTION, SOLUTION INTRAVENOUS CONTINUOUS
Status: DISCONTINUED | OUTPATIENT
Start: 2018-11-22 | End: 2018-11-23

## 2018-11-22 RX ORDER — INSULIN ASPART 100 [IU]/ML
0-5 INJECTION, SOLUTION INTRAVENOUS; SUBCUTANEOUS EVERY 6 HOURS PRN
Status: DISCONTINUED | OUTPATIENT
Start: 2018-11-22 | End: 2018-11-26 | Stop reason: HOSPADM

## 2018-11-22 RX ORDER — SIMVASTATIN 40 MG/1
40 TABLET, FILM COATED ORAL NIGHTLY
Status: DISCONTINUED | OUTPATIENT
Start: 2018-11-22 | End: 2018-11-26 | Stop reason: HOSPADM

## 2018-11-22 RX ORDER — HYDROCHLOROTHIAZIDE 12.5 MG/1
12.5 TABLET ORAL DAILY
Status: DISCONTINUED | OUTPATIENT
Start: 2018-11-23 | End: 2018-11-26 | Stop reason: HOSPADM

## 2018-11-22 RX ADMIN — SODIUM CHLORIDE 1000 ML: 0.9 INJECTION, SOLUTION INTRAVENOUS at 05:11

## 2018-11-22 RX ADMIN — CEFTRIAXONE 1 G: 1 INJECTION, SOLUTION INTRAVENOUS at 07:11

## 2018-11-22 RX ADMIN — ASPIRIN 325 MG: 325 TABLET, DELAYED RELEASE ORAL at 07:11

## 2018-11-22 RX ADMIN — Medication 3 ML: at 09:11

## 2018-11-22 RX ADMIN — DEXTROSE AND SODIUM CHLORIDE: 5; .45 INJECTION, SOLUTION INTRAVENOUS at 09:11

## 2018-11-22 RX ADMIN — STANDARDIZED SENNA CONCENTRATE AND DOCUSATE SODIUM 2 TABLET: 8.6; 5 TABLET, FILM COATED ORAL at 09:11

## 2018-11-22 RX ADMIN — SIMVASTATIN 40 MG: 40 TABLET, FILM COATED ORAL at 09:11

## 2018-11-22 RX ADMIN — HEPARIN SODIUM 5000 UNITS: 5000 INJECTION, SOLUTION INTRAVENOUS; SUBCUTANEOUS at 09:11

## 2018-11-22 RX ADMIN — RAMELTEON 8 MG: 8 TABLET, FILM COATED ORAL at 09:11

## 2018-11-22 RX ADMIN — DEXTROSE MONOHYDRATE 25 G: 25 INJECTION, SOLUTION INTRAVENOUS at 07:11

## 2018-11-22 NOTE — ED PROVIDER NOTES
Encounter Date: 11/22/2018       History   No chief complaint on file.    Time seen by provider: 5:15 PM    This is a 78 y.o. male who presents to the ED from EMS after a fall in his home in [facility]. Initial Accucheck was 38 for which he was administered one amp of D50 with subsequent improvement in his mentation.  EMS notes that patient was found on the floor of his home, altered, without obvious head trauma. Patient complains of regularly feeling cold, and has not been eating though still self administering insulin. Upon arrival at ED last known CBG was 138 and blood pressure was 140/90. EMS denies LOC, Fever.  Significant patient history includes Insulin dependent diabetic, AFIB, Hypertension, and hypolipidemia. He has not been taking his antihypertensives. EMS reports that patient has been falling more recently.       The history is provided by the EMS personnel and the patient.     Review of patient's allergies indicates:  No Known Allergies  Past Medical History:   Diagnosis Date    Diabetes mellitus, type 2     High cholesterol     History of hepatitis C; S/p RX with SVR 23 documented 07/2017 7/18/2017    Hypertension      Past Surgical History:   Procedure Laterality Date    CHOLECYSTECTOMY      CORONARY STENT PLACEMENT      EXPLORATORY LAPAROTOMY W/ BOWEL RESECTION  09/19/2016    ileocecectomy    EXPLORATORY-LAPAROTOMY N/A 9/19/2016    [facility] OR    ILEOCECECTOMY  9/19/2016    [facility] OR    LYSIS OF ADHESIONS N/A 9/5/2018    Procedure: LYSIS, ADHESIONS;  Surgeon: Ariella Lerner DO;  Location: [facility] OR;  Service: General;  Laterality: N/A;    LYSIS, ADHESIONS N/A 9/5/2018    [facility]    OMENTECTOMY N/A 9/5/2018    Procedure: OMENTECTOMY;  Surgeon: Ariella Lerner DO;  Location: [facility] OR;  Service: General;  Laterality: N/A;    OMENTECTOMY N/A 9/5/2018    Performed by Ariella BROWER  [facility] OR    REPAIR,HERNIA,INCISIONAL OR VENTRAL,WITHOUT HISTORY OF PRIOR REPAIR(MIDLINE HERNIA DEFECT 6CMx3.6CM) (LEFT LATERAL HERNIA DEFECT [id number]) (HYBRID) N/A 9/5/2018    [facility] at [facility] OR     No family history on file.  Social History     Tobacco Use    Smoking status: Current Every Day Smoker     Packs/day: 0.50     Types: Cigarettes    Smokeless tobacco: Never Used    Tobacco comment: APPROX 3 CIGARETTES A DAY   Substance Use Topics    Alcohol use: No    Drug use: Yes     Types: Marijuana     Comment: medical     Review of Systems   Constitutional: Positive for appetite change (Not eating when taking insulin) and chills.   All other systems reviewed and are negative.      Physical Exam     Initial Vitals   BP Pulse Resp Temp SpO2   -- -- -- -- --      MAP       --         Physical Exam    Nursing note and vitals reviewed.  Constitutional: He appears well-developed and well-nourished.   HENT:   Head: Normocephalic and atraumatic.   Mucous membranes are partially dry.   Eyes: Conjunctivae and EOM are normal.   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 Neck supple.   Cardiovascular:   Irregularly Irregular rhythm     Pulmonary/Chest:   Coarse breath sounds bilaterally   Abdominal: Soft. There is no tenderness.   HX of abdominal surgery   Genitourinary: Prostate normal and penis normal.   Musculoskeletal: Normal range of motion. He exhibits no edema or tenderness.   Neurological: He is alert and oriented to person, place, and time. He has normal strength.   Skin: Skin is warm.   Skin a little warm to palpation         ED Course   Procedures  Labs Reviewed - No data to display  EKG Readings: (Independently Interpreted)   left axis deviation   Sinus rhythm with 1st degree AV block  Left Axis deviation  right bundle branch block  septal infarct       Imaging Results          CT Head Without Contrast (Final result)  Result time  11/22/18 18:50:27    Final result by Marc Croea MD (11/22/18 18:50:27)                 Impression:      No acute intracranial abnormalities identified.  No significant detrimental change compared to previous CT head from 01/16/2018, allowing for motion limitations.      Electronically signed by: Marc Corea MD  Date:    11/22/2018  Time:    18:50             Narrative:    EXAMINATION:  CT HEAD WITHOUT CONTRAST    CLINICAL HISTORY:  fall;    TECHNIQUE:  Low dose axial images were obtained through the head.  Coronal and sagittal reformations were also performed. Contrast was not administered.    COMPARISON:  CT head from 01/16/2018.    FINDINGS:  Motion limited examination.  There is generalized cerebral volume loss with chronic microvascular ischemic disease.  No evidence of acute/recent major vascular distribution cerebral infarction, intraparenchymal hemorrhage, or intra-axial space occupying lesion. The ventricular system is normal in size and configuration with no evidence of hydrocephalus. No effacement of the skull-base cisterns. No abnormal extra-axial fluid collections or blood products.  Bilateral maxillary and ethmoid sinus disease is seen.  Partial fluid opacification is visualized within the bilateral mastoid air cells.  The calvarium shows no significant abnormality.                               X-Ray Chest AP Portable (Final result)  Result time 11/22/18 18:00:00    Final result by Marc Corea MD (11/22/18 18:00:00)                 Impression:      No acute cardiopulmonary process identified.      Electronically signed by: Marc Corea MD  Date:    11/22/2018  Time:    18:00             Narrative:    EXAMINATION:  XR CHEST AP PORTABLE    CLINICAL HISTORY:  Sepsis;    TECHNIQUE:  Single frontal view of the chest was performed.    COMPARISON:  09/17/2018.    FINDINGS:  Cardiac silhouette is normal in size.  Lungs are symmetrically expanded.  Coarse interstitial lung changes are seen.   No evidence of focal consolidative process, pneumothorax, or significant effusion.  No acute osseous abnormality identified.  Remote right rib deformities are visualized with multiple punctate metallic densities again seen projecting over the right hemithorax.                                 Medical Decision Making:   Initial Assessment:   This is a 78 y.o. male who presents to the ED from EMS after a fall due to hypoglycemia at home in [place], [place].   Clinical Tests:   Lab Tests: Ordered and Reviewed  Medical Tests: Ordered and Reviewed  ED Management:  7:38 PM  Patient improved, tolerating a meal. Troponin elevation noted. Will administer po Aspirin and arrange for admission.               Attending Attestation:   Physician Attestation Statement for Resident:  As the supervising MD . -: I, Dr. Amandeep Garcia, personally performed the services described in this documentation. All medical record entries made by the scribe were at my direction and in my presence.  I have reviewed the chart and agree that the record reflects my personal performance and is accurate and complete                      Clinical Impression:     1. Fall, initial encounter    2. Weakness    3. Elevated troponin    4. Hypoglycemia                               Amandeep Garcia MD  11/22/18 [id number]

## 2018-11-23 DIAGNOSIS — R07.9 CHEST PAIN, UNSPECIFIED TYPE: Primary | ICD-10-CM

## 2018-11-23 LAB
ALBUMIN SERPL BCP-MCNC: 2.8 G/DL
ALP SERPL-CCNC: 57 U/L
ALT SERPL W/O P-5'-P-CCNC: 20 U/L
ANION GAP SERPL CALC-SCNC: 7 MMOL/L
AST SERPL-CCNC: 31 U/L
BASOPHILS # BLD AUTO: 0.03 K/UL
BASOPHILS NFR BLD: 0.4 %
BILIRUB SERPL-MCNC: 1.4 MG/DL
BUN SERPL-MCNC: 15 MG/DL
C PEPTIDE SERPL-MCNC: 5.36 NG/ML
CALCIUM SERPL-MCNC: 8.2 MG/DL
CHLORIDE SERPL-SCNC: 103 MMOL/L
CO2 SERPL-SCNC: 21 MMOL/L
CREAT SERPL-MCNC: 1.6 MG/DL
DIFFERENTIAL METHOD: ABNORMAL
EOSINOPHIL # BLD AUTO: 0.2 K/UL
EOSINOPHIL NFR BLD: 2.7 %
ERYTHROCYTE [DISTWIDTH] IN BLOOD BY AUTOMATED COUNT: 14.9 %
EST. GFR  (AFRICAN AMERICAN): 47 ML/MIN/1.73 M^2
EST. GFR  (NON AFRICAN AMERICAN): 41 ML/MIN/1.73 M^2
GLUCOSE SERPL-MCNC: 88 MG/DL
HCT VFR BLD AUTO: 35.8 %
HGB BLD-MCNC: 11.6 G/DL
LYMPHOCYTES # BLD AUTO: 0.9 K/UL
LYMPHOCYTES NFR BLD: 13.3 %
MCH RBC QN AUTO: 28.9 PG
MCHC RBC AUTO-ENTMCNC: 32.4 G/DL
MCV RBC AUTO: 89 FL
MONOCYTES # BLD AUTO: 1.4 K/UL
MONOCYTES NFR BLD: 20.3 %
NEUTROPHILS # BLD AUTO: 4.4 K/UL
NEUTROPHILS NFR BLD: 63.2 %
PLATELET # BLD AUTO: 161 K/UL
PMV BLD AUTO: 9.2 FL
POCT GLUCOSE: 105 MG/DL (ref 70–110)
POCT GLUCOSE: 121 MG/DL (ref 70–110)
POCT GLUCOSE: 139 MG/DL (ref 70–110)
POCT GLUCOSE: 141 MG/DL (ref 70–110)
POCT GLUCOSE: 148 MG/DL (ref 70–110)
POCT GLUCOSE: 203 MG/DL (ref 70–110)
POCT GLUCOSE: 206 MG/DL (ref 70–110)
POCT GLUCOSE: 228 MG/DL (ref 70–110)
POCT GLUCOSE: 31 MG/DL (ref 70–110)
POCT GLUCOSE: 31 MG/DL (ref 70–110)
POTASSIUM SERPL-SCNC: 3.6 MMOL/L
PROT SERPL-MCNC: 6.4 G/DL
RBC # BLD AUTO: 4.02 M/UL
SODIUM SERPL-SCNC: 131 MMOL/L
TROPONIN I SERPL DL<=0.01 NG/ML-MCNC: 0.05 NG/ML
WBC # BLD AUTO: 7 K/UL

## 2018-11-23 PROCEDURE — 25000003 PHARM REV CODE 250: Performed by: STUDENT IN AN ORGANIZED HEALTH CARE EDUCATION/TRAINING PROGRAM

## 2018-11-23 PROCEDURE — 93010 ELECTROCARDIOGRAM REPORT: CPT | Mod: 76,,, | Performed by: INTERNAL MEDICINE

## 2018-11-23 PROCEDURE — 63600175 PHARM REV CODE 636 W HCPCS: Performed by: STUDENT IN AN ORGANIZED HEALTH CARE EDUCATION/TRAINING PROGRAM

## 2018-11-23 PROCEDURE — 80053 COMPREHEN METABOLIC PANEL: CPT

## 2018-11-23 PROCEDURE — 93005 ELECTROCARDIOGRAM TRACING: CPT

## 2018-11-23 PROCEDURE — 36415 COLL VENOUS BLD VENIPUNCTURE: CPT

## 2018-11-23 PROCEDURE — 85025 COMPLETE CBC W/AUTO DIFF WBC: CPT

## 2018-11-23 PROCEDURE — 94761 N-INVAS EAR/PLS OXIMETRY MLT: CPT

## 2018-11-23 PROCEDURE — A4216 STERILE WATER/SALINE, 10 ML: HCPCS | Performed by: STUDENT IN AN ORGANIZED HEALTH CARE EDUCATION/TRAINING PROGRAM

## 2018-11-23 PROCEDURE — 11000001 HC ACUTE MED/SURG PRIVATE ROOM

## 2018-11-23 PROCEDURE — 27000221 HC OXYGEN, UP TO 24 HOURS

## 2018-11-23 PROCEDURE — 97161 PT EVAL LOW COMPLEX 20 MIN: CPT

## 2018-11-23 PROCEDURE — 99900035 HC TECH TIME PER 15 MIN (STAT)

## 2018-11-23 PROCEDURE — G8979 MOBILITY GOAL STATUS: HCPCS | Mod: CJ

## 2018-11-23 PROCEDURE — 84484 ASSAY OF TROPONIN QUANT: CPT

## 2018-11-23 PROCEDURE — 93010 EKG 12-LEAD: ICD-10-PCS | Mod: 76,,, | Performed by: INTERNAL MEDICINE

## 2018-11-23 PROCEDURE — 93010 ELECTROCARDIOGRAM REPORT: CPT | Mod: ,,, | Performed by: INTERNAL MEDICINE

## 2018-11-23 PROCEDURE — G8978 MOBILITY CURRENT STATUS: HCPCS | Mod: CK

## 2018-11-23 PROCEDURE — 63600175 PHARM REV CODE 636 W HCPCS: Performed by: INTERNAL MEDICINE

## 2018-11-23 RX ORDER — BENZONATATE 100 MG/1
100 CAPSULE ORAL 3 TIMES DAILY PRN
Status: DISCONTINUED | OUTPATIENT
Start: 2018-11-23 | End: 2018-11-26 | Stop reason: HOSPADM

## 2018-11-23 RX ORDER — LEVOFLOXACIN 5 MG/ML
750 INJECTION, SOLUTION INTRAVENOUS
Status: DISCONTINUED | OUTPATIENT
Start: 2018-11-23 | End: 2018-11-23

## 2018-11-23 RX ORDER — ACETAMINOPHEN 325 MG/1
650 TABLET ORAL EVERY 8 HOURS PRN
Status: DISCONTINUED | OUTPATIENT
Start: 2018-11-23 | End: 2018-11-26 | Stop reason: HOSPADM

## 2018-11-23 RX ORDER — LEVOFLOXACIN 5 MG/ML
750 INJECTION, SOLUTION INTRAVENOUS
Status: DISCONTINUED | OUTPATIENT
Start: 2018-11-23 | End: 2018-11-26 | Stop reason: HOSPADM

## 2018-11-23 RX ORDER — DEXTROSE MONOHYDRATE 100 MG/ML
INJECTION, SOLUTION INTRAVENOUS CONTINUOUS
Status: DISCONTINUED | OUTPATIENT
Start: 2018-11-23 | End: 2018-11-24

## 2018-11-23 RX ADMIN — HEPARIN SODIUM 5000 UNITS: 5000 INJECTION, SOLUTION INTRAVENOUS; SUBCUTANEOUS at 09:11

## 2018-11-23 RX ADMIN — Medication 3 ML: at 05:11

## 2018-11-23 RX ADMIN — ASPIRIN 81 MG: 81 TABLET, COATED ORAL at 09:11

## 2018-11-23 RX ADMIN — ACETAMINOPHEN 650 MG: 325 TABLET ORAL at 05:11

## 2018-11-23 RX ADMIN — RAMELTEON 8 MG: 8 TABLET, FILM COATED ORAL at 08:11

## 2018-11-23 RX ADMIN — POLYETHYLENE GLYCOL 3350 17 G: 17 POWDER, FOR SOLUTION ORAL at 09:11

## 2018-11-23 RX ADMIN — STANDARDIZED SENNA CONCENTRATE AND DOCUSATE SODIUM 2 TABLET: 8.6; 5 TABLET, FILM COATED ORAL at 09:11

## 2018-11-23 RX ADMIN — DEXTROSE: 10 SOLUTION INTRAVENOUS at 01:11

## 2018-11-23 RX ADMIN — ACETAMINOPHEN 650 MG: 325 TABLET ORAL at 12:11

## 2018-11-23 RX ADMIN — DEXTROSE MONOHYDRATE 12.5 G: 500 INJECTION PARENTERAL at 12:11

## 2018-11-23 RX ADMIN — DEXTROSE: 10 SOLUTION INTRAVENOUS at 08:11

## 2018-11-23 RX ADMIN — HEPARIN SODIUM 5000 UNITS: 5000 INJECTION, SOLUTION INTRAVENOUS; SUBCUTANEOUS at 08:11

## 2018-11-23 RX ADMIN — SIMVASTATIN 40 MG: 40 TABLET, FILM COATED ORAL at 08:11

## 2018-11-23 RX ADMIN — BENZONATATE 100 MG: 100 CAPSULE ORAL at 03:11

## 2018-11-23 RX ADMIN — STANDARDIZED SENNA CONCENTRATE AND DOCUSATE SODIUM 2 TABLET: 8.6; 5 TABLET, FILM COATED ORAL at 08:11

## 2018-11-23 RX ADMIN — HYDROCHLOROTHIAZIDE 12.5 MG: 12.5 TABLET ORAL at 09:11

## 2018-11-23 RX ADMIN — LEVOFLOXACIN 750 MG: 750 INJECTION, SOLUTION INTRAVENOUS at 01:11

## 2018-11-23 NOTE — PLAN OF CARE
Problem: Physical Therapy Goal  Goal: Physical Therapy Goal  Goals to be met by: 12/23/18     Patient will increase functional independence with mobility by performing:    Independent bed mobility  SPVN sit <> stand with use of straight cane  Ambulate 150 feet with SPC spvn  Perform LE TE x 15 reps in sitting and standing      Outcome: Ongoing (interventions implemented as appropriate)  INitial evaluation completed. PT to follow. Rec  PT at discharge

## 2018-11-23 NOTE — ED NOTES
Patient provided meal per SIMRAN Garcia MD orders. Orders to check blood glucose after patient is finished with meal.

## 2018-11-23 NOTE — PT/OT/SLP PROGRESS
"Occupational Therapy  Visit Attempt     Patient Name:  Jose Roca   MRN:  [id number]    Patient not seen today secondary to declining participation due to fatigue. Pt with mumbled speech and difficulty to understand, but asking nsg present if he could have something for his cold. Also told therapist, " my sugar is so low I almost passed out."    . Will follow-up as available.    Shayy Hurt, OT  11/23/2018  "

## 2018-11-23 NOTE — H&P
[facility] History and Physical    Admitting Team: [facility] Internal [facility] B  Attending Physician: Dr. Villa  Resident: Reymundo  Interns: Esther    Date of Admit: 11/22/2018    Chief Complaint     Falls for 1 day    Subjective:      History of Present Illness:  Jose Roca is a 78 y.o. AA male with a PMHx of DM2, HTN, HLD, Hep C w/ Cirrhosis, CAD w/ stent placed in late 2000s at OSH, and BPH. The patient presented to [facility] on 11/22/2018 with a primary complaint of falls x 1 day.    The patient was in their usual state of health (Able to complete all ADLs, lives with wife) until 4 days ago when he began to notice increasing rhinorrhea, cough, and generalized fatigue. He notes difficulty sleeping 2/2 these complaints. His cough was worsened by throat irritation from what sounds like post-nasal drip which causes him to cough and gag. Yesterday he took his nighttime dose of insulin and began to notice he grew increasingly fatigue and weak. He fell multiple times. He never checks his sugar at home so he does not know what his values were at the time. He notes that his appetite has been poor 2/2 feeling sick for the past few days. He went to sleep and woke up but remained falling and weak. He endorses cold sweats as well. He took his morning medications including Glimepiride and eventually called the ambulance when his weakness persisted. Of note, he is a chronic tobacco abuser. On ROS he endorses incontinence of urine over the last 1 day. He has a hx of recently dx cirrhosis 2/2 hep C as well as a bout of urinary retention 2/2 BPH 2 months ago after a hernia repair surgery. He denies any chest pain, SOB, back pain, LOC.     Past Medical History:  Past Medical History:   Diagnosis Date    Diabetes mellitus, type 2     High cholesterol     History of hepatitis C; S/p RX with SVR 23 documented 07/2017 7/18/2017    Hypertension        Past Surgical  History:  Past Surgical History:   Procedure Laterality Date    CHOLECYSTECTOMY      CORONARY STENT PLACEMENT      EXPLORATORY LAPAROTOMY W/ BOWEL RESECTION  2016    ileocecectomy    EXPLORATORY-LAPAROTOMY N/A 2016    [facility] OR    ILEOCECECTOMY  2016    [facility]    LYSIS OF ADHESIONS N/A 2018    Procedure: LYSIS, ADHESIONS;  Surgeon: Ariella Lerner DO;  Location: [facility] OR;  Service: General;  Laterality: N/A;    LYSIS, ADHESIONS N/A 2018    [facility] at [facility] OR    OMENTECTOMY N/A 2018    Procedure: OMENTECTOMY;  Surgeon: Ariella Lerner DO;  Location: [facility] OR;  Service: General;  Laterality: N/A;    OMENTECTOMY N/A 2018    [facility] at [facility] OR    REPAIR,HERNIA,INCISIONAL OR VENTRAL,WITHOUT HISTORY OF PRIOR REPAIR(MIDLINE HERNIA DEFECT 6CMx3.6CM) (LEFT LATERAL HERNIA DEFECT 4.8SWt2EG) (HYBRID) N/A 2018    Performed by [facility] at [facility] OR       Allergies:  Review of patient's allergies indicates:  No Known Allergies    Home Medications:  Lantus 15 units nightly  Glimepiride 4mg daily  Simvastatin 40mg   Gabapentin 300 TID  Losartan/HCTZ 100/12.5 daily  Trazadone 50 mg nightly    Family History:  No family history on file.    Social History:  Social History     Tobacco Use    Smoking status: Current Every Day Smoker     Packs/day: 0.50     Types: Cigarettes    Smokeless tobacco: Never Used    Tobacco comment: APPROX 3 CIGARETTES A DAY   Substance Use Topics    Alcohol use: No    Drug use: Yes     Types: Marijuana     Comment: medical       Review of Systems:  Pertinent items are noted in HPI. All other systems are reviewed and are negative.    Health Maintaince :   Primary Care Physician: Shaheen    Objective:   Last 24 Hour Vital Signs:  BP  Min: 139/72  Max: 196/96  Temp  Av.4 °F (37.4 °C)  Min: 99.3 °F (37.4 °C)   "Max: 99.5 °F (37.5 °C)  Pulse  Av.3  Min: 86  Max: 104  Resp  Av.3  Min: 22  Max: 32  SpO2  Av.7 %  Min: 99 %  Max: 100 %  Height  Av' 9" (175.3 cm)  Min: 5' 9" (175.3 cm)  Max: 5' 9" (175.3 cm)  Weight  Av.4 kg (197 lb)  Min: 89.4 kg (197 lb)  Max: 89.4 kg (197 lb)  Body mass index is 29.09 kg/m².  No intake/output data recorded.    Physical Examination:  BP (!) 179/81 (BP Location: Right arm, Patient Position: Lying)   Pulse 99   Temp 99.5 °F (37.5 °C) (Oral)   Resp (!) 28   Ht 5' 9" (1.753 m)   Wt 89.4 kg (197 lb)   SpO2 100%   BMI 29.09 kg/m²     General Appearance:    Closer R eye, appears w/ severe glaucoma, sleepy but arousable   Head:    Normocephalic, without obvious abnormality, atraumatic   Eyes:    Blind in R eye, EOMI   Nose:   Rhinorrhea, septum midline, mucosa normal, no drainage    or sinus tenderness   Throat:   Dry MMM   Neck:   Supple, symmetrical, trachea midline, no adenopathy;        thyroid:  No enlargement/tenderness/nodules; no carotid    bruit or JVD   Lungs:     Diffuse wheeze in all lung fields appreciated   Heart:    Irregular, corresponds w/ frequent PVCs   Abdomen:     Soft, non-tender, bowel sounds active all four quadrants,     no masses, hernia repair well healed   Extremities:   Extremities normal, atraumatic, no cyanosis or edema   Pulses:   2+ and symmetric all extremities   Skin:   Skin color, texture, turgor normal, no rashes or lesions   Lymph nodes:   Cervical, supraclavicular, and axillary nodes normal   Neurologic:   Normal strength and sensation throughout, CN3-7, 9-10 normal     Laboratory:  Most Recent Data:  CBC:   Lab Results   Component Value Date    WBC 7.26 2018    HGB 12.8 (L) 2018    HCT 39.4 (L) 2018     2018    MCV 88 2018    RDW 14.8 (H) 2018     BMP:   Lab Results   Component Value Date     (L) 2018    K 3.7 2018     2018    CO2 24 2018    BUN 16 " 11/22/2018    CREATININE 1.6 (H) 11/22/2018    GLU 65 (L) 11/22/2018    CALCIUM 8.8 11/22/2018    MG 1.7 11/22/2018    PHOS 1.1 (L) 11/22/2018     LFTs:   Lab Results   Component Value Date    PROT 7.6 11/22/2018    ALBUMIN 3.4 (L) 11/22/2018    BILITOT 1.9 (H) 11/22/2018    AST 34 11/22/2018    ALKPHOS 70 11/22/2018    ALT 24 11/22/2018     Coags:   Lab Results   Component Value Date    INR 1.0 11/22/2018     DM:   Lab Results   Component Value Date    HGBA1C 5.8 (H) 09/05/2018    HGBA1C 9.2 (H) 09/20/2016    CREATININE 1.6 (H) 11/22/2018   Cardiac:   Lab Results   Component Value Date    TROPONINI 0.053 (H) 11/22/2018     (H) 11/22/2018     Urinalysis:   Lab Results   Component Value Date    LABURIN  09/17/2018     PSEUDOMONAS AERUGINOSA  >100,000 cfu/ml  No other significant isolate      COLORU Yellow 11/22/2018    SPECGRAV 1.025 11/22/2018    NITRITE Negative 11/22/2018    KETONESU Negative 11/22/2018    UROBILINOGEN Negative 11/22/2018    WBCUA 5 11/22/2018       Trended Lab Data:  Recent Labs   Lab 11/22/18  1736   WBC 7.26   HGB 12.8*   HCT 39.4*      MCV 88   RDW 14.8*   *   K 3.7      CO2 24   BUN 16   CREATININE 1.6*   GLU 65*   PROT 7.6   ALBUMIN 3.4*   BILITOT 1.9*   AST 34   ALKPHOS 70   ALT 24       Trended Cardiac Data:  Recent Labs   Lab 11/22/18  1736   TROPONINI 0.053*   *       Microbiology Data:  Blood cx pending    Other Results:  EKG (my interpretation): unchanged from previous tracings, RBBB, occasional PVC noted, unifocal, 1st degree AV block.    Radiology:  Imaging Results          CT Head Without Contrast (Final result)  Result time 11/22/18 18:50:27    Final result by Marc Corea MD (11/22/18 18:50:27)                 Impression:      No acute intracranial abnormalities identified.  No significant detrimental change compared to previous CT head from 01/16/2018, allowing for motion limitations.      Electronically signed by: Marc Corea,  MD  Date:    11/22/2018  Time:    18:50             Narrative:    EXAMINATION:  CT HEAD WITHOUT CONTRAST    CLINICAL HISTORY:  fall;    TECHNIQUE:  Low dose axial images were obtained through the head.  Coronal and sagittal reformations were also performed. Contrast was not administered.    COMPARISON:  CT head from 01/16/2018.    FINDINGS:  Motion limited examination.  There is generalized cerebral volume loss with chronic microvascular ischemic disease.  No evidence of acute/recent major vascular distribution cerebral infarction, intraparenchymal hemorrhage, or intra-axial space occupying lesion. The ventricular system is normal in size and configuration with no evidence of hydrocephalus. No effacement of the skull-base cisterns. No abnormal extra-axial fluid collections or blood products.  Bilateral maxillary and ethmoid sinus disease is seen.  Partial fluid opacification is visualized within the bilateral mastoid air cells.  The calvarium shows no significant abnormality.                               X-Ray Chest AP Portable (Final result)  Result time 11/22/18 18:00:00    Final result by Marc Corea MD (11/22/18 18:00:00)                 Impression:      No acute cardiopulmonary process identified.      Electronically signed by: Marc Corea MD  Date:    11/22/2018  Time:    18:00             Narrative:    EXAMINATION:  XR CHEST AP PORTABLE    CLINICAL HISTORY:  Sepsis;    TECHNIQUE:  Single frontal view of the chest was performed.    COMPARISON:  09/17/2018.    FINDINGS:  Cardiac silhouette is normal in size.  Lungs are symmetrically expanded.  Coarse interstitial lung changes are seen.  No evidence of focal consolidative process, pneumothorax, or significant effusion.  No acute osseous abnormality identified.  Remote right rib deformities are visualized with multiple punctate metallic densities again seen projecting over the right hemithorax.                                   Assessment:     Jose SANTAMARIA  Abelino is a 78 y.o. AA male with a PMHx of DM2, HTN, HLD, Hep C w/ Cirrhosis, CAD w/ stent placed in late 2000s at OSH, and BPH. The patient presented to [facility] on 11/22/2018 with a primary complaint of falls x 1 day. He was found to be hypoglycemic to 38 via EMS, was given D50 amp and rechecked on arrival and again low at 68 on admit labs. Given usage of long acting insulin and PO meds, placed on D5 drip and admitted to [facility] Med service.      Plan:     Acute Hypoglycemia, suspect medication induced  - 4 days of decreased PO intake, usage of Glimepiride and long acting insulin  - CBG of 38 on EMS check, repeat labs in ED w/ persistent hypoglycemia  - check C-peptide, hold PO antiglycemic meds. SSI + accuchecks  - low suspicion of infectious causes given neg procal, WBCs, lactate  - ischemic w/u as below     Intermediate Troponin  - trop of 0.053 on admission in setting of CKD3, patient denies having chest pain but is a diabetic  - initial EKG w/ baseline abnormalities noted above  - trend EKG, troponins. Check Echo in the AM for wall motion abnormalities.     DM2  - check HbA1C  - SSI and accuchecks until hypoglycemia resolves  - discontinue glimepiride on discharge given CKD and hypoglycemic episodes, needs titration of meds by PCP    HTN  - restart home meds as needed, monitor    Urinary incontinence  - per patient, 3 day hx of worsening urinary incontinence. On chart review, patient w/ previous issues with urinary retention. Likely poor bladder function from poor diabetes control  - monitor while inpatient, consider anti-spasmodic. Re-refer to urology on discharge for monitoring.     CAD s/p stent placement in 2010  - per aptient had stent placed in 2010 at hospital in [place]  - no ASA on home meds brought in, low intensity statin  - f/u echo given elevated BNP, may need AICD placed  - optimize medications at discharge    Hep C Cirrhosis  - undergoing outpatient workup per chart  review    CKD3  - BUN/Cr at 16/1.6 near prior noted baseline  - suspect 2/2 poor DM and HTN control  - renally dose medications    PPX: Heparin  Diet: Diabetic  Dispo: echo, resolution of hypoglycemia, trend trops. PT/OT eval. Anticipate DC tomorrow.     Planning:  May need help with meds, f/u PT/OT evals. Would be good home health candidate.     Code Status:     Full    [place] MD  hospitals Internal Medicine HO-II  hospitals Internal Medicine Service Team B    hospitals Medicine Hospitalist Pager numbers:   hospitals Hospitalist Medicine Team A (Maya/Serg): [phone]  hospitals Hospitalist Medicine Team B (Allen/Dino):  [phone]

## 2018-11-23 NOTE — PT/OT/SLP EVAL
"Physical Therapy Evaluation    Patient Name:  Jose Roca   MRN:  [id number]    Recommendations:     Discharge Recommendations:  home health PT, home health OT   Discharge Equipment Recommendations: none   Barriers to discharge: None    Assessment:     Jose Roca is a 78 y.o. male admitted with a medical diagnosis of Hypoglycemia.  He presents with the following impairments/functional limitations:  weakness, impaired endurance, impaired balance, visual deficits, impaired functional mobilty, impaired sensation, gait instability, impaired joint extensibility, impaired cardiopulmonary response to activity Pt was modified independent PTA with use of SC, now deconditioned and weak due to illness and fluctuating BG levels. Anticipate that patient will return to prior level of conditioning with addition of HH PT/OT upon discharge. .    Rehab Prognosis:  good; patient would benefit from acute skilled PT services to address these deficits and reach maximum level of function.      Recent Surgery: * No surgery found *      Plan:     During this hospitalization, patient to be seen 5 x/week to address the above listed problems via gait training, therapeutic activities, therapeutic exercises  · Plan of Care Expires:  12/23/18   Plan of Care Reviewed with: patient    Subjective     Communicated with RN Barbara and Rebekah prior to session.  Patient found up in chair via nursing assist.  upon PT entry to room, agreeable to evaluation       Chief Complaint: Pt stating immediately that his is cold and wants a blanket followed by complaints of feeling weak as if her were going to pass out.  Nursing notified immediately and in to check BG, but WNL at 121. Once comfortable, pt stated that it was time for his stories "the [employer] [place] [employer]" and that they go "way back"  Patient comments/goals: Pt provided info regarding living situation and PLOF. Reports that he has had a leg length discrepancy all his life, but it never " "bothered him, as he was a  for the [employer] and has walked up and down mountains all [place].  Reports that he does not see a podiatrist because his "walking days are over".  Unsure if he had an injury to his LLE leading to the LLD, left leg shorter. 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 Reports that the fall that was reported was the only fall he has had and he states he tried to get up from the edge of the bed and had no strength and slipped off the edge to the floor.  Pain/Comfort:  · Pain Rating 1: 0/10    Patients cultural, spiritual, Caodaism conflicts given the current situation: none verbalized    Living Environment:  Pt lives with his brother in law and his CASSIUS sister and brother - 4 people in the home. SSH, no steps, tub/shower, only uses SC but has RW.  When he feels strong, he stands to shower, otherwise he sits on a stool by the sink and wipes off there.  He does not drive but relies on friends and relatives for transportation. His CASSIUS's sister cooks breakfast which he normally eats everyday in addition to one other meal during the day. He does not wear diapers at home.Does not wear o2 at home, but currently on o2 at facility.  Prior to admission, patients level of function was modified independent.  Patient has the following equipment: cane, straight.  DME owned (not currently used): rolling walker.  Upon discharge, patient will have assistance from family members in the home, along with recommended HH PT/OT.    Objective:     Patient found with: peripheral IV, oxygen(wet diaper)     General Precautions: Standard, fall   Orthopedic Precautions:N/A   Braces: N/A     Exams:  · Cognitive Exam:  Patient is oriented to Person, Place, Time and Situation  · Gross Motor Coordination:  WFL  · Postural Exam:  Patient presented with the following abnormalities:    · -       Rounded shoulders  · -       Forward head  · -       Lateral weight shift of hips  · -       leg length " "discrepancy with significant atrophy of Left lower leg, left leg shorter  · Sensation: Pt reports that his feet and toes are numb and that he "takes pills for that numbness"  · Skin Integrity/Edema:      · -       L foot with ulcerations, bunion, very dry flaky skin - does not follow with podiatry; L hand with moderate edema - removed coban at wrist from area of old blood draw or IV  · RLE ROM: WFL except lacks neutral dorsiflexion  · RLE Strength: WFL  · LLE ROM: WFL except DF to neutral  · LLE Strength: WFL  Pt noted with increased work of breathing during minimal exertion of transferring chair to bed    Functional Mobility:  · Bed Mobility:     · Rolling Left:  modified independence  · Bridging: contact guard assistance  · Sit to Supine: stand by assistance  · Transfers:     · Sit to Stand:  stand by assistance with no AD  · Bed to Chair: contact guard assistance with  no AD and holding onto IV pole and bed   using  Step Transfer  · Gait: Pt took several turning steps with CGA while holding IV pole and bed and he transferred to the bed  · Balance: fair    AM-PAC 6 CLICK MOBILITY  Total Score:19       Therapeutic Activities and Exercises:   Pt with wet diaper, requested PCT to provide hygiene care.     Patient left supine with all lines intact, call button in reach and RN present.    GOALS:   Multidisciplinary Problems     Physical Therapy Goals        Problem: Physical Therapy Goal    Goal Priority Disciplines Outcome Goal Variances Interventions   Physical Therapy Goal     PT, PT/OT Ongoing (interventions implemented as appropriate)     Description:  Goals to be met by: 12/23/18     Patient will increase functional independence with mobility by performing:    Independent bed mobility  SPVN sit <> stand with use of straight cane  Ambulate 150 feet with SPC spvn  Perform LE TE x 15 reps in sitting and standing                        History:     Past Medical History:   Diagnosis Date    Diabetes mellitus, type 2  "    High cholesterol     History of hepatitis C; S/p RX with SVR 23 documented 07/2017 7/18/2017    Hypertension        Past Surgical History:   Procedure Laterality Date    CHOLECYSTECTOMY      CORONARY STENT PLACEMENT      EXPLORATORY LAPAROTOMY W/ BOWEL RESECTION  09/19/2016    ileocecectomy    EXPLORATORY-LAPAROTOMY N/A 9/19/2016    [facility]    ILEOCECECTOMY  9/19/2016    [facility]    LYSIS OF ADHESIONS N/A 9/5/2018    Procedure: LYSIS, ADHESIONS;  Surgeon: Ariella Lerner DO;  Location: [facility] OR;  Service: General;  Laterality: N/A;    LYSIS, ADHESIONS N/A 9/5/2018    [facility]    OMENTECTOMY N/A 9/5/2018    Procedure: OMENTECTOMY;  Surgeon: Ariella Lerner DO;  Location: [facility] OR;  Service: General;  Laterality: N/A;    OMENTECTOMY N/A 9/5/2018    [facility]    REPAIR,HERNIA,INCISIONAL OR VENTRAL,WITHOUT HISTORY OF PRIOR REPAIR(MIDLINE HERNIA DEFECT 6CMx3.6CM) (LEFT LATERAL HERNIA DEFECT [id number]) (HYBRID) N/A 9/5/2018    [facility] OR       Clinical Decision Making:     History  Co-morbidities and personal factors that may impact the plan of care Examination  Body Structures and Functions, activity limitations and participation restrictions that may impact the plan of care Clinical Presentation   Decision Making/ Complexity Score   Co-morbidities:   [] Time since onset of injury / illness / exacerbation  [x] Status of current condition  []Patient's cognitive status and safety concerns    [] Multiple Medical Problems (see med hx)  Personal Factors:   [] Patient's age  [] Prior Level of function   [] Patient's home situation (environment and family support)  [] Patient's level of motivation  [] Expected progression of patient      HISTORY:(criteria)    [x] 62851 - no personal factors/history    [] 08514 - has 1-2  personal factor/comorbidity     [] 88865 - has >3 personal factor/comorbidity     Body Regions:  [] Objective examination findings  [] Head     []  Neck  [] Trunk   [] Upper Extremity  [] Lower Extremity    Body Systems:  [] For communication ability, affect, cognition, language, and learning style: the assessment of the ability to make needs known, consciousness, orientation (person, place, and time), expected emotional /behavioral responses, and learning preferences (eg, learning barriers, education  needs)  [x] For the neuromuscular system: a general assessment of gross coordinated movement (eg, balance, gait, locomotion, transfers, and transitions) and motor function  (motor control and motor learning)  [x] For the musculoskeletal system: the assessment of gross symmetry, gross range of motion, gross strength, height, and weight  [] For the integumentary system: the assessment of pliability(texture), presence of scar formation, skin color, and skin integrity  [x] For cardiovascular/pulmonary system: the assessment of heart rate, respiratory rate, blood pressure, and edema     Activity limitations:    [] Patient's cognitive status and saf ety concerns          [] Status of current condition      [] Weight bearing restriction  [] Cardiopulmunary Restriction    Participation Restrictions:   [] Goals and goal agreement with the patient     [] Rehab potential (prognosis) and probable outcome      Examination of Body System: (criteria)    [] 71139 - addressing 1-2 elements    [x] 43152 - addressing a total of 3 or more elements     [] 06056 -  Addressing a total of 4 or more elements         Clinical Presentation: (criteria)  Stable - 55683     On examination of body system using standardized tests and measures patient presents with 3 or more elements from any of the following: body structures and functions, activity limitations, and/or participation restrictions.  Leading to a clinical presentation that is considered  stable and/or uncomplicated                              Clinical Decision Making  (Eval Complexity):  Choose One     Time Tracking:     PT Received On: 11/23/18  PT Start Time: 1051     PT Stop Time: 1113  PT Total Time (min): 22 min     Billable Minutes: Evaluation 22      Chari Jean-Baptiste, PT  11/23/2018

## 2018-11-23 NOTE — PLAN OF CARE
"[place] met with pt   lives with brother in law Vishal Mcgill   [phone]   no hh, pt has a RW   pt states he has a glucometer but doesn't regularly check his blood sugar.  states, "I know how to do it but I haven't been doing it regularly".     Future Appointments   Date Time Provider Department Center   11/29/2018 10:15 AM [facility] US1 RVPH [facility]   12/14/2018  8:30 AM Naldo Navarro DPM [facility] NAHEED Solorioi     pt states he has an apt Mon with Dr. Jamison - will keep this apt        11/23/18 7441   Discharge Assessment   Assessment Type Discharge Planning Assessment   Confirmed/corrected address and phone number on facesheet? Yes   Assessment information obtained from? Patient;Medical Record   Expected Length of Stay (days) 3   Communicated expected length of stay with patient/caregiver yes   Prior to hospitilization cognitive status: Alert/Oriented   Prior to hospitalization functional status: Assistive Equipment   Current cognitive status: Alert/Oriented   Current Functional Status: Assistive Equipment   Lives With (lives with brother in law Vishal Delgado5 [phone] )   Able to Return to Prior Arrangements yes   Is patient able to care for self after discharge? Yes   Who are your caregiver(s) and their phone number(s)? (brother in law Rodgers  )   Patient's perception of discharge disposition home or selfcare   Readmission Within The Last 30 Days no previous admission in last 30 days   Patient currently being followed by outpatient case management? No   Patient currently receives any other outside agency services? No   Equipment Currently Used at Home walker, rolling   Do you have any problems affording any of your prescribed medications? No   Is the patient taking medications as prescribed? yes   Does the patient have transportation home? (pt states he isn't sure how he is getting home at discharge )   Transportation Available (TBD -- may need transportation to home.  )   Does the " patient receive services at the [facility]? No   Discharge Plan A Home;Home with family   Discharge Plan B Home;Home with family;Home Health   Patient/Family In Agreement With Plan yes

## 2018-11-23 NOTE — PROGRESS NOTES
Notified MD that patient arrived to floor with BP of 170/87 and BG 80. Also patient asking for something to sleep.

## 2018-11-23 NOTE — PLAN OF CARE
Problem: Patient Care Overview  Goal: Plan of Care Review  Outcome: Ongoing (interventions implemented as appropriate)  Patient resting in bed, AAOx4. IVF infusing as ordered. Medications administered as ordered. No complaints of pain or discomfort. Patients BG at midnight was 31, administered PRN d50 and started d10 infusion. Asleep on and off through the shift. Telemetry on, NSR. Encouraged to call with needs or concerns. Will continue to monitor.

## 2018-11-23 NOTE — PLAN OF CARE
11/23/18 1011   FUNK Message   Medicare Outpatient and Observation Notification regarding financial responsibility Given to patient/caregiver;Explained to patient/caregiver;Signed/date by patient/caregiver   Date FUNK was signed 11/23/18   Time FUNK was signed 1008

## 2018-11-23 NOTE — PROGRESS NOTES
"[facility] Internal Medicine Progress Note  Date of Admit: 2018    Subjective:      Mr. Roca reports again having further epsidoes of weakness and dizziness this morning corresponding to a CBG level of 31, he was given an additional amp of d50 this AM and remains on the D10 gtt. Stressed the importance of food intake so his sugars do not continue to bottom out. Expect he haas start to improve over the course of today as his glimepiride half life and his long acting insulin begin to clear his system. Noted to be in atrial fibrillation w/ normal HR on telemetry this AM.     Objective:   Last 24 Hour Vital Signs:  BP  Min: 139/72  Max: 196/96  Temp  Av.5 °F (37.5 °C)  Min: 98.1 °F (36.7 °C)  Max: 101.6 °F (38.7 °C)  Pulse  Av  Min: 76  Max: 104  Resp  Av  Min: 17  Max: 32  SpO2  Av.5 %  Min: 99 %  Max: 100 %  Height  Av' 9" (175.3 cm)  Min: 5' 9" (175.3 cm)  Max: 5' 9" (175.3 cm)  Weight  Av.4 kg (194 lb 13.5 oz)  Min: 87.4 kg (192 lb 10.9 oz)  Max: 89.4 kg (197 lb)  Body mass index is 28.45 kg/m².  I/O last 3 completed shifts:  In: 1894.5 [P.O.:472; I.V.:222.5; IV Piggyback:1200]  Out: 774 [Urine:774]    Physical Examination:  BP (!) 159/85   Pulse 88   Temp 98.2 °F (36.8 °C)   Resp 17   Ht 5' 9" (1.753 m)   Wt 87.4 kg (192 lb 10.9 oz)   SpO2 99%   BMI 28.45 kg/m²     General Appearance:    Closer R eye, appears w/ severe glaucoma, sleepy but arousable   Head:    Normocephalic, without obvious abnormality, atraumatic   Eyes:    Blind in R eye, EOMI   Nose:   Rhinorrhea, septum midline, mucosa normal, no drainage    or sinus tenderness   Throat:   Dry MMM   Neck:   Supple, symmetrical, trachea midline, no adenopathy;        thyroid:  No enlargement/tenderness/nodules; no carotid    bruit or JVD   Lungs:     Diffuse wheeze in all lung fields appreciated   Heart:    Irregular, corresponds w/ frequent PVCs   Abdomen:     Soft, non-tender, bowel sounds active all four " quadrants,     no masses, hernia repair well healed   Extremities:   Extremities normal, atraumatic, no cyanosis or edema   Pulses:   2+ and symmetric all extremities   Skin:   Skin color, texture, turgor normal, no rashes or lesions   Lymph nodes:   Cervical, supraclavicular, and axillary nodes normal   Neurologic:   Normal strength and sensation throughout, CN3-7, 9-10 normal     Laboratory:  Most Recent Data:    Trended Lab Data:  Recent Labs   Lab 11/22/18  1736 11/23/18  0145   WBC 7.26 7.00   HGB 12.8* 11.6*   HCT 39.4* 35.8*    161   MCV 88 89   RDW 14.8* 14.9*   * 131*   K 3.7 3.6    103   CO2 24 21*   BUN 16 15   CREATININE 1.6* 1.6*   GLU 65* 88   PROT 7.6 6.4   ALBUMIN 3.4* 2.8*   BILITOT 1.9* 1.4*   AST 34 31   ALKPHOS 70 57   ALT 24 20       Trended Cardiac Data:  Recent Labs   Lab 11/22/18 1736 11/22/18  1950 11/23/18  0144   TROPONINI 0.053* 0.046* 0.053*   *  --   --        Microbiology Data:  Blood cx - NGTD  Flu - neg  Procal - neg    Other Results:  EKG (my interpretation): atrial fibrillation    Radiology:  Imaging Results          CT Head Without Contrast (Final result)  Result time 11/22/18 18:50:27    Final result by Marc Corea MD (11/22/18 18:50:27)                 Impression:      No acute intracranial abnormalities identified.  No significant detrimental change compared to previous CT head from 01/16/2018, allowing for motion limitations.      Electronically signed by: Marc Corea MD  Date:    11/22/2018  Time:    18:50             Narrative:    EXAMINATION:  CT HEAD WITHOUT CONTRAST    CLINICAL HISTORY:  fall;    TECHNIQUE:  Low dose axial images were obtained through the head.  Coronal and sagittal reformations were also performed. Contrast was not administered.    COMPARISON:  CT head from 01/16/2018.    FINDINGS:  Motion limited examination.  There is generalized cerebral volume loss with chronic microvascular ischemic disease.  No evidence of  acute/recent major vascular distribution cerebral infarction, intraparenchymal hemorrhage, or intra-axial space occupying lesion. The ventricular system is normal in size and configuration with no evidence of hydrocephalus. No effacement of the skull-base cisterns. No abnormal extra-axial fluid collections or blood products.  Bilateral maxillary and ethmoid sinus disease is seen.  Partial fluid opacification is visualized within the bilateral mastoid air cells.  The calvarium shows no significant abnormality.                               X-Ray Chest AP Portable (Final result)  Result time 11/22/18 18:00:00    Final result by Marc Corea MD (11/22/18 18:00:00)                 Impression:      No acute cardiopulmonary process identified.      Electronically signed by: Marc Corea MD  Date:    11/22/2018  Time:    18:00             Narrative:    EXAMINATION:  XR CHEST AP PORTABLE    CLINICAL HISTORY:  Sepsis;    TECHNIQUE:  Single frontal view of the chest was performed.    COMPARISON:  09/17/2018.    FINDINGS:  Cardiac silhouette is normal in size.  Lungs are symmetrically expanded.  Coarse interstitial lung changes are seen.  No evidence of focal consolidative process, pneumothorax, or significant effusion.  No acute osseous abnormality identified.  Remote right rib deformities are visualized with multiple punctate metallic densities again seen projecting over the right hemithorax.                                   Assessment:     Jose Roca is a 78 y.o. AA male with a PMHx of DM2, HTN, HLD, Hep C w/ Cirrhosis, CAD w/ stent placed in late 2000s at OSH, and BPH. The patient presented to [facility] on 11/22/2018 with a primary complaint of falls x 1 day. He was found to be hypoglycemic to 38 via EMS, was given D50 amp and rechecked on arrival and again low at 68 on admit labs. Given usage of long acting insulin and PO meds, placed on D5 drip and admitted to [facility] Med service.      Plan:      Acute Hypoglycemia, suspect medication induced  - 4 days of decreased PO intake, usage of Glimepiride and long acting insulin  - CBG of 38 on EMS check, repeat labs in ED w/ persistent hypoglycemia  - check C-peptide, hold PO antiglycemic meds. SSI + accuchecks  - low suspicion of infectious causes given neg procal, WBCs, lactate  - ischemic w/u as below     Intermediate Troponin  - trop of 0.053 on admission in setting of CKD3, patient denies having chest pain but is a diabetic  - initial EKG w/ baseline abnormalities noted above  - trend EKG, troponins. Stayed stationary at ~0.05. Check Echo for wall motion abnormalities. Can consider stress test while inpatient.     Paroxysmal Atrial Fibrillation  - chart review has a lone episode of atrial fibrillation noted in post-op period. He was again noted to be in atrial fibrillation during this admission  - echo pending  - CHADSVASC of 5, will discuss starting Eliquis with patient, would like to correct hypoglycemic episodes given risk for falls prior to initiation    DM2  - check HbA1C, now at 5.5  - SSI and accuchecks until hypoglycemia resolves  - discontinue glimepiride on discharge given CKD and hypoglycemic episodes, needs titration of meds by PCP    HTN  - restart home meds as needed, monitor    Urinary incontinence  - per patient, 3 day hx of worsening urinary incontinence. On chart review, patient w/ previous issues with urinary retention. Likely poor bladder function from poor diabetes control  - monitor while inpatient, consider anti-spasmodic. Re-refer to urology on discharge for monitoring.     CAD s/p stent placement in 2010  - per aptient had stent placed in 2010 at hospital in [place]  - no ASA on home meds brought in, low intensity statin  - f/u echo given elevated BNP, may need AICD placed  - optimize medications at discharge    Hep C Cirrhosis  - undergoing outpatient workup per chart review    CKD3  - BUN/Cr at 16/1.6 near prior noted baseline  -  suspect 2/2 poor DM and HTN control  - renally dose medications    PPX: Heparin  Diet: Diabetic  Dispo: echo, resolution of hypoglycemia. PT/OT eval. Anticipate DC tomorrow.     Planning:  May need help with meds, f/u PT/OT evals. Would be good home health candidate.     Code Status:     Full    New Dwyer MD  [facility] Internal Medicine HO-II  [facility] Internal Medicine Service Team B    [facility] Medicine Hospitalist Pager numbers:   [facility] Hospitalist Medicine Team A (Maya/Serg): [phone]  [facility] Hospitalist Medicine Team B (Allen/Dino):  [phone]

## 2018-11-24 LAB
ALBUMIN SERPL BCP-MCNC: 2.8 G/DL
ALP SERPL-CCNC: 58 U/L
ALT SERPL W/O P-5'-P-CCNC: 17 U/L
ANION GAP SERPL CALC-SCNC: 8 MMOL/L
AORTIC ROOT ANNULUS: 4.31 CM
AST SERPL-CCNC: 30 U/L
AV INDEX (PROSTH): 0.9
AV MEAN GRADIENT: 3.16 MMHG
AV PEAK GRADIENT: 5.66 MMHG
AV VALVE AREA: 3.97 CM2
BASOPHILS # BLD AUTO: 0.03 K/UL
BASOPHILS NFR BLD: 0.5 %
BILIRUB SERPL-MCNC: 1.2 MG/DL
BSA FOR ECHO PROCEDURE: 2.06 M2
BUN SERPL-MCNC: 13 MG/DL
CALCIUM SERPL-MCNC: 8.8 MG/DL
CHLORIDE SERPL-SCNC: 100 MMOL/L
CO2 SERPL-SCNC: 24 MMOL/L
CREAT SERPL-MCNC: 1.6 MG/DL
CV ECHO LV RWT: 0.4 CM
DIFFERENTIAL METHOD: ABNORMAL
DOP CALC AO PEAK VEL: 1.19 M/S
DOP CALC AO VTI: 23.47 CM
DOP CALC LVOT AREA: 4.41 CM2
DOP CALC LVOT DIAMETER: 2.37 CM
DOP CALC LVOT STROKE VOLUME: 93.08 CM3
DOP CALCLVOT PEAK VEL VTI: 21.11 CM
ECHO LV POSTERIOR WALL: 1.08 CM (ref 0.6–1.1)
EOSINOPHIL # BLD AUTO: 0.6 K/UL
EOSINOPHIL NFR BLD: 8.9 %
ERYTHROCYTE [DISTWIDTH] IN BLOOD BY AUTOMATED COUNT: 14.7 %
EST. GFR  (AFRICAN AMERICAN): 47 ML/MIN/1.73 M^2
EST. GFR  (NON AFRICAN AMERICAN): 41 ML/MIN/1.73 M^2
FRACTIONAL SHORTENING: 36 % (ref 28–44)
GLUCOSE SERPL-MCNC: 128 MG/DL
HCT VFR BLD AUTO: 39.4 %
HGB BLD-MCNC: 12.8 G/DL
INTERVENTRICULAR SEPTUM: 1.09 CM (ref 0.6–1.1)
LA MAJOR: 4.92 CM
LA MINOR: 4.93 CM
LA WIDTH: 4.05 CM
LEFT ATRIUM SIZE: 4.07 CM
LEFT ATRIUM VOLUME INDEX: 33.5 ML/M2
LEFT ATRIUM VOLUME: 69 CM3
LEFT INTERNAL DIMENSION IN SYSTOLE: 3.47 CM (ref 2.1–4)
LEFT VENTRICLE DIASTOLIC VOLUME INDEX: 68.03 ML/M2
LEFT VENTRICLE DIASTOLIC VOLUME: 140.14 ML
LEFT VENTRICLE MASS INDEX: 111.2 G/M2
LEFT VENTRICLE SYSTOLIC VOLUME INDEX: 24.2 ML/M2
LEFT VENTRICLE SYSTOLIC VOLUME: 49.75 ML
LEFT VENTRICULAR INTERNAL DIMENSION IN DIASTOLE: 5.38 CM (ref 3.5–6)
LEFT VENTRICULAR MASS: 229.11 G
LYMPHOCYTES # BLD AUTO: 1.7 K/UL
LYMPHOCYTES NFR BLD: 27.7 %
MCH RBC QN AUTO: 28.8 PG
MCHC RBC AUTO-ENTMCNC: 32.5 G/DL
MCV RBC AUTO: 89 FL
MONOCYTES # BLD AUTO: 1.1 K/UL
MONOCYTES NFR BLD: 17.7 %
NEUTROPHILS # BLD AUTO: 2.8 K/UL
NEUTROPHILS NFR BLD: 45 %
PISA TR MAX VEL: 2.14 M/S
PLATELET # BLD AUTO: 160 K/UL
PMV BLD AUTO: 9.5 FL
POCT GLUCOSE: 141 MG/DL (ref 70–110)
POCT GLUCOSE: 146 MG/DL (ref 70–110)
POCT GLUCOSE: 181 MG/DL (ref 70–110)
POCT GLUCOSE: 219 MG/DL (ref 70–110)
POTASSIUM SERPL-SCNC: 4.1 MMOL/L
PROT SERPL-MCNC: 6.6 G/DL
PV PEAK VELOCITY: 0.83 CM/S
RA MAJOR: 4.03 CM
RA PRESSURE: 3 MMHG
RBC # BLD AUTO: 4.44 M/UL
RIGHT VENTRICULAR END-DIASTOLIC DIMENSION: 3.15 CM
SODIUM SERPL-SCNC: 132 MMOL/L
TR MAX PG: 18.32 MMHG
TV REST PULMONARY ARTERY PRESSURE: 21.32 MMHG
WBC # BLD AUTO: 6.17 K/UL

## 2018-11-24 PROCEDURE — 25000003 PHARM REV CODE 250: Performed by: STUDENT IN AN ORGANIZED HEALTH CARE EDUCATION/TRAINING PROGRAM

## 2018-11-24 PROCEDURE — 36415 COLL VENOUS BLD VENIPUNCTURE: CPT

## 2018-11-24 PROCEDURE — 85025 COMPLETE CBC W/AUTO DIFF WBC: CPT

## 2018-11-24 PROCEDURE — 25000003 PHARM REV CODE 250

## 2018-11-24 PROCEDURE — 80053 COMPREHEN METABOLIC PANEL: CPT

## 2018-11-24 PROCEDURE — 97165 OT EVAL LOW COMPLEX 30 MIN: CPT

## 2018-11-24 PROCEDURE — 94761 N-INVAS EAR/PLS OXIMETRY MLT: CPT

## 2018-11-24 PROCEDURE — 97535 SELF CARE MNGMENT TRAINING: CPT

## 2018-11-24 PROCEDURE — G8987 SELF CARE CURRENT STATUS: HCPCS | Mod: CK

## 2018-11-24 PROCEDURE — G8988 SELF CARE GOAL STATUS: HCPCS | Mod: CI

## 2018-11-24 PROCEDURE — 63600175 PHARM REV CODE 636 W HCPCS: Performed by: STUDENT IN AN ORGANIZED HEALTH CARE EDUCATION/TRAINING PROGRAM

## 2018-11-24 PROCEDURE — 11000001 HC ACUTE MED/SURG PRIVATE ROOM

## 2018-11-24 RX ORDER — DEXTROSE MONOHYDRATE 50 MG/ML
INJECTION, SOLUTION INTRAVENOUS CONTINUOUS
Status: DISCONTINUED | OUTPATIENT
Start: 2018-11-24 | End: 2018-11-25

## 2018-11-24 RX ADMIN — DEXTROSE: 5 SOLUTION INTRAVENOUS at 12:11

## 2018-11-24 RX ADMIN — RAMELTEON 8 MG: 8 TABLET, FILM COATED ORAL at 08:11

## 2018-11-24 RX ADMIN — SIMVASTATIN 40 MG: 40 TABLET, FILM COATED ORAL at 08:11

## 2018-11-24 RX ADMIN — ASPIRIN 81 MG: 81 TABLET, COATED ORAL at 09:11

## 2018-11-24 RX ADMIN — HEPARIN SODIUM 5000 UNITS: 5000 INJECTION, SOLUTION INTRAVENOUS; SUBCUTANEOUS at 08:11

## 2018-11-24 RX ADMIN — ACETAMINOPHEN 650 MG: 325 TABLET ORAL at 08:11

## 2018-11-24 RX ADMIN — STANDARDIZED SENNA CONCENTRATE AND DOCUSATE SODIUM 2 TABLET: 8.6; 5 TABLET, FILM COATED ORAL at 09:11

## 2018-11-24 RX ADMIN — POLYETHYLENE GLYCOL 3350 17 G: 17 POWDER, FOR SOLUTION ORAL at 09:11

## 2018-11-24 RX ADMIN — LOSARTAN POTASSIUM 100 MG: 50 TABLET, FILM COATED ORAL at 09:11

## 2018-11-24 RX ADMIN — HYDROCHLOROTHIAZIDE 12.5 MG: 12.5 TABLET ORAL at 09:11

## 2018-11-24 RX ADMIN — STANDARDIZED SENNA CONCENTRATE AND DOCUSATE SODIUM 2 TABLET: 8.6; 5 TABLET, FILM COATED ORAL at 08:11

## 2018-11-24 RX ADMIN — INSULIN ASPART 2 UNITS: 100 INJECTION, SOLUTION INTRAVENOUS; SUBCUTANEOUS at 12:11

## 2018-11-24 RX ADMIN — HEPARIN SODIUM 5000 UNITS: 5000 INJECTION, SOLUTION INTRAVENOUS; SUBCUTANEOUS at 09:11

## 2018-11-24 NOTE — PLAN OF CARE
Problem: Fall Risk (Adult)  Goal: Absence of Falls  Patient will demonstrate the desired outcomes by discharge/transition of care.  Outcome: Ongoing (interventions implemented as appropriate)  Discussed fall care prevention with pt/Call light and personal items placed within pt.s reach.

## 2018-11-24 NOTE — PLAN OF CARE
Problem: Patient Care Overview  Goal: Plan of Care Review  Outcome: Ongoing (interventions implemented as appropriate)  Pt up in chair most of day. Poor appetite educated  on importance of eating. 1L nasal cannula. Complains of cough. Blood sugar checked without interventions required.

## 2018-11-24 NOTE — PROGRESS NOTES
"[facility] Internal Medicine Progress Note  Date of Admit: 2018    Subjective:      Pt remains on D10 with BG 120s-220s.  This morning, pt reports dizziness every time he lifts his head, still feeling nauseous, and with chills. Denies abd pain, vomiting, changes to BMs, pain anywhere.    Objective:   Last 24 Hour Vital Signs:  BP  Min: 108/69  Max: 140/78  Temp  Av.7 °F (36.5 °C)  Min: 96.6 °F (35.9 °C)  Max: 98.5 °F (36.9 °C)  Pulse  Av.7  Min: 55  Max: 88  Resp  Av  Min: 17  Max: 20  SpO2  Av.8 %  Min: 96 %  Max: 99 %  Weight  Av.9 kg (184 lb 15.5 oz)  Min: 83.9 kg (184 lb 15.5 oz)  Max: 83.9 kg (184 lb 15.5 oz)  Body mass index is 27.31 kg/m².  I/O last 3 completed shifts:  In: 3944.5 [P.O.:1372; I.V.:1372.5; IV Piggyback:1200]  Out: 2681 [Urine:2681]    Physical Examination:  /69 (BP Location: Left arm, Patient Position: Lying)   Pulse 62   Temp 96.6 °F (35.9 °C) (Oral)   Resp 18   Ht 5' 9" (1.753 m)   Wt 83.9 kg (184 lb 15.5 oz)   SpO2 97%   BMI 27.31 kg/m²     General Appearance:    NAD, sleepy but arousable   Head:    Normocephalic, without obvious abnormality, atraumatic   Eyes:    Blind in R eye, EOMI   Nose:   Rhinorrhea, septum midline, mucosa normal, no drainage    or sinus tenderness   Throat:   Dry MMM   Neck:   Supple, symmetrical, trachea midline, no adenopathy;        thyroid:  No enlargement/tenderness/nodules; no carotid    bruit or JVD   Lungs:     Diffuse wheeze in all lung fields appreciated   Heart:    Irregular rhythm, regular rate   Abdomen:     Soft, non-tender, bowel sounds active all four quadrants,     no masses, hernia repair well healed   Extremities:   Extremities normal, atraumatic, no cyanosis or edema   Pulses:   2+ and symmetric all extremities   Skin:   Skin color, texture, turgor normal, no rashes or lesions   Lymph nodes:   Cervical, supraclavicular, and axillary nodes normal   Neurologic:   Normal strength and sensation " throughout     Laboratory:  Most Recent Data:    Trended Lab Data:  Recent Labs   Lab 11/22/18  1736 11/23/18  0145 11/24/18  0447   WBC 7.26 7.00 6.17   HGB 12.8* 11.6* 12.8*   HCT 39.4* 35.8* 39.4*    161 160   MCV 88 89 89   RDW 14.8* 14.9* 14.7*   * 131* 132*   K 3.7 3.6 4.1    103 100   CO2 24 21* 24   BUN 16 15 13   CREATININE 1.6* 1.6* 1.6*   GLU 65* 88 128*   PROT 7.6 6.4 6.6   ALBUMIN 3.4* 2.8* 2.8*   BILITOT 1.9* 1.4* 1.2*   AST 34 31 30   ALKPHOS 70 57 58   ALT 24 20 17       Trended Cardiac Data:  Recent Labs   Lab 11/22/18  1736 11/22/18  1950 11/23/18  0144   TROPONINI 0.053* 0.046* 0.053*   *  --   --        Microbiology Data:  Blood cx - NGTD  Flu - neg  Procal - neg    Other Results:  EKG (my interpretation): atrial fibrillation    Radiology:  Imaging Results          CT Head Without Contrast (Final result)  Result time 11/22/18 18:50:27    Final result by Marc Corea MD (11/22/18 18:50:27)                 Impression:      No acute intracranial abnormalities identified.  No significant detrimental change compared to previous CT head from 01/16/2018, allowing for motion limitations.      Electronically signed by: Marc Corea MD  Date:    11/22/2018  Time:    18:50             Narrative:    EXAMINATION:  CT HEAD WITHOUT CONTRAST    CLINICAL HISTORY:  fall;    TECHNIQUE:  Low dose axial images were obtained through the head.  Coronal and sagittal reformations were also performed. Contrast was not administered.    COMPARISON:  CT head from 01/16/2018.    FINDINGS:  Motion limited examination.  There is generalized cerebral volume loss with chronic microvascular ischemic disease.  No evidence of acute/recent major vascular distribution cerebral infarction, intraparenchymal hemorrhage, or intra-axial space occupying lesion. The ventricular system is normal in size and configuration with no evidence of hydrocephalus. No effacement of the skull-base cisterns. No abnormal  extra-axial fluid collections or blood products.  Bilateral maxillary and ethmoid sinus disease is seen.  Partial fluid opacification is visualized within the bilateral mastoid air cells.  The calvarium shows no significant abnormality.                               X-Ray Chest AP Portable (Final result)  Result time 11/22/18 18:00:00    Final result by Marc Corea MD (11/22/18 18:00:00)                 Impression:      No acute cardiopulmonary process identified.      Electronically signed by: Marc Corea MD  Date:    11/22/2018  Time:    18:00             Narrative:    EXAMINATION:  XR CHEST AP PORTABLE    CLINICAL HISTORY:  Sepsis;    TECHNIQUE:  Single frontal view of the chest was performed.    COMPARISON:  09/17/2018.    FINDINGS:  Cardiac silhouette is normal in size.  Lungs are symmetrically expanded.  Coarse interstitial lung changes are seen.  No evidence of focal consolidative process, pneumothorax, or significant effusion.  No acute osseous abnormality identified.  Remote right rib deformities are visualized with multiple punctate metallic densities again seen projecting over the right hemithorax.                                   Assessment:     Jose Roca is a 78 y.o. AA male with a PMHx of DM2, HTN, HLD, Hep C w/ Cirrhosis, CAD w/ stent placed in late 2000s at OSH, and BPH. The patient presented to [facility] on 11/22/2018 with a primary complaint of falls x 1 day. He was found to be hypoglycemic to 38 via EMS, was given D50 amp and rechecked on arrival and again low at 68 on admit labs. Given usage of long acting insulin and PO meds, placed on D5 drip and admitted to [facility] Med service.      Plan:     Acute Hypoglycemia, suspect medication induced  - 4 days of decreased PO intake, usage of Glimepiride and long acting insulin  - CBG of 38 on EMS check, repeat labs in ED w/ persistent hypoglycemia  - check C-peptide, hold PO antiglycemic meds. SSI + accuchecks  - low  suspicion of infectious causes given neg procal, WBCs, lactate  - ischemic w/u as below   - pt placed on D10 continuous at 50mL/hr on 11/22 > BG 120s-220s 11/23    Intermediate Troponin  - trop of 0.053 on admission in setting of CKD3, patient denies having chest pain but is a diabetic  - initial EKG w/ baseline abnormalities noted above  - trend EKG, troponins. Stayed stationary at ~0.05. Can consider stress test while inpatient.   - Echo 11/23 official report pending    Paroxysmal Atrial Fibrillation  - chart review has a lone episode of atrial fibrillation noted in post-op period. He was again noted to be in atrial fibrillation during this admission  - echo pending  - CHADSVASC of 5, will discuss starting Eliquis with patient, would like to correct hypoglycemic episodes given risk for falls prior to initiation    DM2  - check HbA1C, now at 5.5  - SSI and accuchecks until hypoglycemia resolves  - discontinue glimepiride on discharge given CKD and hypoglycemic episodes, needs titration of meds by PCP    HTN  - restart home meds as needed, monitor    Urinary incontinence  - per patient, 3 day hx of worsening urinary incontinence. On chart review, patient w/ previous issues with urinary retention. Likely poor bladder function from poor diabetes control  - monitor while inpatient, consider anti-spasmodic. Re-refer to urology on discharge for monitoring.     CAD s/p stent placement in 2010  - per aptient had stent placed in 2010 at hospital in [place]  - no ASA on home meds brought in, low intensity statin  - f/u echo given elevated BNP, may need AICD placed  - optimize medications at discharge    Hep C Cirrhosis  - undergoing outpatient workup per chart review    CKD3  - BUN/Cr at 16/1.6 near prior noted baseline  - suspect 2/2 poor DM and HTN control  - renally dose medications    PPX: Heparin  Diet: Diabetic  Dispo: echo, resolution of hypoglycemia. PT/OT eval    Planning:  May need help with meds, f/u PT/OT evals.  Would be good home health candidate.     Code Status:     Full    Clarita Glass MD  [facility] Internal Medicine HO-I  [facility] Internal Medicine Service Team B    [facility] Medicine Hospitalist Pager numbers:   [facility] Hospitalist Medicine Team A (Maya/Serg): [phone]  [facility] Hospitalist Medicine Team B (Allen/Dino):  [phone]

## 2018-11-25 LAB
ALBUMIN SERPL BCP-MCNC: 2.8 G/DL
ALP SERPL-CCNC: 57 U/L
ALT SERPL W/O P-5'-P-CCNC: 15 U/L
ANION GAP SERPL CALC-SCNC: 6 MMOL/L
AST SERPL-CCNC: 20 U/L
BASOPHILS # BLD AUTO: 0.04 K/UL
BASOPHILS NFR BLD: 0.5 %
BILIRUB SERPL-MCNC: 1 MG/DL
BUN SERPL-MCNC: 17 MG/DL
CALCIUM SERPL-MCNC: 8.9 MG/DL
CHLORIDE SERPL-SCNC: 101 MMOL/L
CO2 SERPL-SCNC: 26 MMOL/L
CREAT SERPL-MCNC: 1.7 MG/DL
DIFFERENTIAL METHOD: ABNORMAL
EOSINOPHIL # BLD AUTO: 0.6 K/UL
EOSINOPHIL NFR BLD: 6.9 %
ERYTHROCYTE [DISTWIDTH] IN BLOOD BY AUTOMATED COUNT: 14.3 %
EST. GFR  (AFRICAN AMERICAN): 44 ML/MIN/1.73 M^2
EST. GFR  (NON AFRICAN AMERICAN): 38 ML/MIN/1.73 M^2
GLUCOSE SERPL-MCNC: 145 MG/DL
HCT VFR BLD AUTO: 35.4 %
HGB BLD-MCNC: 11.8 G/DL
LYMPHOCYTES # BLD AUTO: 2.4 K/UL
LYMPHOCYTES NFR BLD: 29.6 %
MCH RBC QN AUTO: 28.8 PG
MCHC RBC AUTO-ENTMCNC: 33.3 G/DL
MCV RBC AUTO: 86 FL
MONOCYTES # BLD AUTO: 1.3 K/UL
MONOCYTES NFR BLD: 15.8 %
NEUTROPHILS # BLD AUTO: 3.8 K/UL
NEUTROPHILS NFR BLD: 46.5 %
PLATELET # BLD AUTO: 211 K/UL
PMV BLD AUTO: 9.7 FL
POCT GLUCOSE: 113 MG/DL (ref 70–110)
POCT GLUCOSE: 140 MG/DL (ref 70–110)
POCT GLUCOSE: 151 MG/DL (ref 70–110)
POCT GLUCOSE: 208 MG/DL (ref 70–110)
POTASSIUM SERPL-SCNC: 4 MMOL/L
PROT SERPL-MCNC: 6.5 G/DL
RBC # BLD AUTO: 4.1 M/UL
SODIUM SERPL-SCNC: 133 MMOL/L
WBC # BLD AUTO: 8.14 K/UL

## 2018-11-25 PROCEDURE — G8979 MOBILITY GOAL STATUS: HCPCS | Mod: CJ | Performed by: PHYSICAL THERAPIST

## 2018-11-25 PROCEDURE — A4216 STERILE WATER/SALINE, 10 ML: HCPCS | Performed by: STUDENT IN AN ORGANIZED HEALTH CARE EDUCATION/TRAINING PROGRAM

## 2018-11-25 PROCEDURE — 97530 THERAPEUTIC ACTIVITIES: CPT | Performed by: PHYSICAL THERAPIST

## 2018-11-25 PROCEDURE — 63600175 PHARM REV CODE 636 W HCPCS: Performed by: INTERNAL MEDICINE

## 2018-11-25 PROCEDURE — 85025 COMPLETE CBC W/AUTO DIFF WBC: CPT

## 2018-11-25 PROCEDURE — 25000003 PHARM REV CODE 250: Performed by: STUDENT IN AN ORGANIZED HEALTH CARE EDUCATION/TRAINING PROGRAM

## 2018-11-25 PROCEDURE — 36415 COLL VENOUS BLD VENIPUNCTURE: CPT

## 2018-11-25 PROCEDURE — G8978 MOBILITY CURRENT STATUS: HCPCS | Mod: CK | Performed by: PHYSICAL THERAPIST

## 2018-11-25 PROCEDURE — 11000001 HC ACUTE MED/SURG PRIVATE ROOM

## 2018-11-25 PROCEDURE — 80053 COMPREHEN METABOLIC PANEL: CPT

## 2018-11-25 PROCEDURE — 25000003 PHARM REV CODE 250

## 2018-11-25 PROCEDURE — 63600175 PHARM REV CODE 636 W HCPCS: Performed by: STUDENT IN AN ORGANIZED HEALTH CARE EDUCATION/TRAINING PROGRAM

## 2018-11-25 PROCEDURE — 94761 N-INVAS EAR/PLS OXIMETRY MLT: CPT

## 2018-11-25 RX ORDER — MECLIZINE HCL 12.5 MG 12.5 MG/1
25 TABLET ORAL ONCE
Status: COMPLETED | OUTPATIENT
Start: 2018-11-25 | End: 2018-11-25

## 2018-11-25 RX ADMIN — SIMVASTATIN 40 MG: 40 TABLET, FILM COATED ORAL at 08:11

## 2018-11-25 RX ADMIN — Medication 3 ML: at 01:11

## 2018-11-25 RX ADMIN — HEPARIN SODIUM 5000 UNITS: 5000 INJECTION, SOLUTION INTRAVENOUS; SUBCUTANEOUS at 08:11

## 2018-11-25 RX ADMIN — HYDROCHLOROTHIAZIDE 12.5 MG: 12.5 TABLET ORAL at 08:11

## 2018-11-25 RX ADMIN — MECLIZINE 25 MG: 12.5 TABLET ORAL at 10:11

## 2018-11-25 RX ADMIN — POLYETHYLENE GLYCOL 3350 17 G: 17 POWDER, FOR SOLUTION ORAL at 08:11

## 2018-11-25 RX ADMIN — INSULIN ASPART 2 UNITS: 100 INJECTION, SOLUTION INTRAVENOUS; SUBCUTANEOUS at 01:11

## 2018-11-25 RX ADMIN — LEVOFLOXACIN 750 MG: 750 INJECTION, SOLUTION INTRAVENOUS at 01:11

## 2018-11-25 RX ADMIN — Medication 3 ML: at 09:11

## 2018-11-25 RX ADMIN — STANDARDIZED SENNA CONCENTRATE AND DOCUSATE SODIUM 2 TABLET: 8.6; 5 TABLET, FILM COATED ORAL at 08:11

## 2018-11-25 RX ADMIN — LOSARTAN POTASSIUM 100 MG: 50 TABLET, FILM COATED ORAL at 08:11

## 2018-11-25 RX ADMIN — ASPIRIN 81 MG: 81 TABLET, COATED ORAL at 08:11

## 2018-11-25 RX ADMIN — RAMELTEON 8 MG: 8 TABLET, FILM COATED ORAL at 09:11

## 2018-11-25 NOTE — PLAN OF CARE
Problem: Patient Care Overview  Goal: Plan of Care Review  Outcome: Revised  Patient is awake, alert, and oriented.  Patient sits up in chair for several hours.  Patient continues to receive IVF.  Last blood sugar is 146.  Tolerates all meals without any nausea or vomiting.  Incontinent of bowel and bladder and wears the diaper.   Safety is maintained with bed low, wheels locked and side rails up.  Call light within reach.  Will continue to monitor.

## 2018-11-25 NOTE — PLAN OF CARE
Problem: Patient Care Overview  Goal: Plan of Care Review  Outcome: Revised  Patient is awake, alert, and oriented.  Denies pain.  Patient is incontinent and wears the diaper.  IVF discontinued this am as per MD's order.  Accuchecks within limits.  Safety is maintained with bed low, wheels locked, and side rails up.  Call light within reach.  Will continue to monitor.

## 2018-11-25 NOTE — PLAN OF CARE
Problem: Patient Care Overview  Goal: Plan of Care Review  Outcome: Ongoing (interventions implemented as appropriate)  Plan of care discussed with patient. Glucose monitored. IV fluids infusing at ordered rate. Patient encouraged to call staff and make aware any needs that might arise. Will continue to monitor.

## 2018-11-25 NOTE — PLAN OF CARE
Problem: Physical Therapy Goal  Goal: Physical Therapy Goal  Goals to be met by: 12/23/18     Patient will increase functional independence with mobility by performing:    Independent bed mobility  SPVN sit <> stand with use of straight cane  Ambulate 150 feet with SPC spvn  Perform LE TE x 15 reps in sitting and standing       Outcome: Ongoing (interventions implemented as appropriate)  Pt would benefit from skilled PT to progress functional mobility.

## 2018-11-25 NOTE — PLAN OF CARE
Problem: Occupational Therapy Goal  Goal: Occupational Therapy Goal  Goals to be met by: 12/24/2018      Patient will increase functional independence with ADLs by performing:    UE Dressing with Modified [place].  LE Dressing with Stand-by Assistance.  Grooming while standing with Supervision.  Toileting from toilet with Stand-by Assistance for hygiene and clothing management.   Sitting at edge of bed x10 minutes with Modified [place].  Step transfer with Supervision  Toilet transfer to toilet with Supervision.  Increased functional strength to WFL for self care.  Upper extremity exercise program x8 reps per handout, with independence.    Outcome: Ongoing (interventions implemented as appropriate)  Pt would benefit from continued OT to address deficits in self care and functional mobility. Recommending HHOT/PT; DME needs possibly BSC, pending progress

## 2018-11-25 NOTE — PT/OT/SLP PROGRESS
Physical Therapy Treatment    Patient Name:  Jose Roca   MRN:  [id number]    Recommendations:     Discharge Recommendations:  home health OT, home health PT   Discharge Equipment Recommendations: bedside commode   Barriers to discharge: None    Assessment:     Jose Roca is a 78 y.o. male admitted with a medical diagnosis of Hypoglycemia.  He presents with the following impairments/functional limitations:  weakness, impaired functional mobilty, impaired endurance, gait instability, impaired balance, impaired self care skills, decreased lower extremity function.  Pt limited by c/o dizziness but able to stand and amb for/back 4' x 2 and to HOB 2' with RW with CG.  Pt also able to talk with nurses outside of room despite c/o dizziness in all positions.      Rehab Prognosis:  Good; patient would benefit from acute skilled PT services to address these deficits and reach maximum level of function.      Recent Surgery: * No surgery found *      Plan:     During this hospitalization, patient to be seen 5 x/week to address the above listed problems via gait training, therapeutic activities, therapeutic exercises, neuromuscular re-education, wheelchair management/training  · Plan of Care Expires:  12/25/18   Plan of Care Reviewed with: patient    Subjective     Communicated with nurse prior to session.  Patient found supine upon PT entry to room, had recently returned to supine after sitting up in b/s chair, agreeable to treatment, despite reportedly still feeling dizzy/tired.      Chief Complaint: feels dizzy/tired  Patient comments/goals: pt able to/chooses to converse with nurses in [place] despite c/o dizziness/tired.  Pain/Comfort:  · Pain Rating 1: 0/10    Patients cultural, spiritual, Advent conflicts given the current situation: none    Objective:     Patient found with: bed alarm, peripheral IV     General Precautions: Standard, fall   Orthopedic Precautions:N/A   Braces:       Functional Mobility:  · Bed  Mobility:     · Supine to Sit: contact guard assistance  · Sit to Supine: contact guard assistance  · Transfers:     · Sit to Stand:  contact guard assistance with rolling walker  · Gait: Pt ambulated 4' x 2 and 2' to HOB with RW with CG, limited by c/o dizziness/fatigue.  · Balance: Pt has F/F+ dynamic standing balance with RW      AM-PAC 6 CLICK MOBILITY  Turning over in bed (including adjusting bedclothes, sheets and blankets)?: 4  Sitting down on and standing up from a chair with arms (e.g., wheelchair, bedside commode, etc.): 3  Moving from lying on back to sitting on the side of the bed?: 3  Moving to and from a bed to a chair (including a wheelchair)?: 3  Need to walk in hospital room?: 3  Climbing 3-5 steps with a railing?: 3  Basic Mobility Total Score: 19     Pt sat at EOB 14 min total.      Patient left HOB elevated with all lines intact, call button in reach and nurse notified..    GOALS:   Multidisciplinary Problems     Physical Therapy Goals        Problem: Physical Therapy Goal    Goal Priority Disciplines Outcome Goal Variances Interventions   Physical Therapy Goal     PT, PT/OT Ongoing (interventions implemented as appropriate)     Description:  Goals to be met by: 12/23/18     Patient will increase functional independence with mobility by performing:    Independent bed mobility  SPVN sit <> stand with use of straight cane  Ambulate 150 feet with SPC spvn  Perform LE TE x 15 reps in sitting and standing               Problem: Physical Therapy Goal    Goal Priority Disciplines Outcome Goal Variances Interventions   Physical Therapy Goal     PT, PT/OT                      Time Tracking:     PT Received On: 11/25/18  PT Start Time: 1011     PT Stop Time: 1036  PT Total Time (min): 25 min     Billable Minutes: Therapeutic Activity 25    Treatment Type: Treatment  PT/PTA: PT     PTA Visit Number: 0     Elmira Pacheco, PT  11/25/2018

## 2018-11-25 NOTE — PT/OT/SLP EVAL
Occupational Therapy   Evaluation    Name: Jose Roca  MRN: [id number]  Admitting Diagnosis:  Hypoglycemia      Recommendations:     Discharge Recommendations: home health OT, home health PT  Discharge Equipment Recommendations:  bedside commode  Barriers to discharge:  Decreased caregiver support    History:     Occupational Profile:  Living Environment: Pt lives in CASSIUS's home with 4 adults SSH, 0STE, Tub/sh combo.  Previous level of function: Previously mod I to independent for functional mobility and required assist to don shoes/socks  Equipment Used at Home:  cane, straight, walker, rolling  Assistance upon Discharge: Limited family    Past Medical History:   Diagnosis Date    Diabetes mellitus, type 2     High cholesterol     History of hepatitis C; S/p RX with SVR 23 documented 07/2017 7/18/2017    Hypertension        Past Surgical History:   Procedure Laterality Date    CHOLECYSTECTOMY      CORONARY STENT PLACEMENT      EXPLORATORY LAPAROTOMY W/ BOWEL RESECTION  09/19/2016    ileocecectomy    EXPLORATORY-LAPAROTOMY N/A 9/19/2016    Performed by [facility] at [facility]    ILEOCECECTOMY  9/19/2016    Performed by [facility]umbo [facility] at [facility] OR    LYSIS OF ADHESIONS N/A 9/5/2018    Procedure: LYSIS, ADHESIONS;  Surgeon: Ariella Lerner DO;  Location: [facility] OR;  Service: General;  Laterality: N/A;    LYSIS, ADHESIONS N/A 9/5/2018    Per[facility] by [facility] at [facility] OR    OMENTECTOMY N/A 9/5/2018    Procedure: OMENTECTOMY;  Surgeon: Ariella Lerner DO;  Location: [facility] OR;  Service: General;  Laterality: N/A;    OMENTECTOMY N/A 9/5/2018    Per[facility] by [facility] at [facility] OR    REPAIR,HERNIA,INCISIONAL OR VENTRAL,WITHOUT HISTORY OF PRIOR REPAIR(MIDLINE HERNIA DEFECT 6CMx3.6CM) (LEFT LATERAL HERNIA DEFECT 4.2DSg7LH) (HYBRID) N/A 9/5/2018    Performed by Ariella Lerner DO at [facility] OR       Subjective     Chief Complaint: Pt with no complaints this  PM  Patient/Family Comments/goals: To return home and not to fall    Pain/Comfort:  · Pain Rating 1: 0/10    Patients cultural, spiritual, Holiness conflicts given the current situation:      Objective:     Communicated with: nsg prior to session.  Patient found with: all lines intact, call button in reach, bed alarm on and nsg notified and bed alarm, peripheral IV upon OT entry to room.    General Precautions: Standard, fall   Orthopedic Precautions:N/A   Braces: N/A     Occupational Performance:    Bed Mobility:    · Patient completed Rolling/Turning to Left with  stand by assistance  · Patient completed Supine to Sit with stand by assistance    Functional Mobility/Transfers:  · Patient completed Sit <> Stand Transfer with contact guard assistance  with  rolling walker   · Patient completed Bed <> Chair Transfer using Step Transfer technique with minimum assistance with rolling walker  · Functional Mobility: Pt with fair dynamic sitting and fair to fair- standing balance.    Activities of Daily Living:  · Upper Body Dressing: moderate assistance to don gown as robe 2/2 PIV  · Lower Body Dressing: maximal assistance to don B socks    Cognitive/Visual Perceptual:  Cognitive/Psychosocial Skills:     -       Oriented to: Person, Place, Time and Situation   -       Follows Commands/attention:Follows multistep  commands  -       Communication: clear/fluent  -       Memory: No Deficits noted  -       Safety awareness/insight to disability: intact   -       Mood/Affect/Coping skills/emotional control: Appropriate to situation    Physical Exam:  Postural examination/scapula alignment:    -       Rounded shoulders  -       Forward head  Edema:  Mild LUE  Motor Planning:    -       WFL  Upper Extremity Range of Motion: BUE WFL    Upper Extremity Strength:  BUE grossly 4 to 4+/5   Strength: BUE WFL   Fine Motor Coordination:    -       Intact  Gross motor coordination:   WFL    AMPAC 6 Click ADL:  AMPAC Total Score:  "16    Treatment & Education:  Pt educated on role of OT and POC.   Pt performing skills as listed above.  Pt educated on use of BSC at home as pt states he has a "piss bucket" at his bedside and when he is feeling weak he uses this though occasionally he is too weak to remain seated EOB and slips, spilling the contents of the bucket on himself and the floor and expresses embarrassment at lying in a puddle of his own urine unable to stand. Pt states, "Who will empty the bucket?" and states that he does not want a BSC because "He doesn't need to e older than he is" Pt educated on use of SC but he states his tub/sh combo is too small for a SC.   Education:    Patient left up in chair with all lines intact, call button in reach, chair alarm on and nsg notified    Assessment:     Jose Roca is a 78 y.o. male with a medical diagnosis of Hypoglycemia.  He presents with the following performance deficits affecting function: weakness, impaired endurance, impaired sensation, impaired self care skills, impaired functional mobilty, gait instability, impaired balance, impaired cognition, decreased coordination, decreased upper extremity function, decreased lower extremity function, decreased safety awareness, impaired coordination.      Rehab Prognosis: Good; patient would benefit from acute skilled OT services to address these deficits and reach maximum level of function.         Clinical Decision Makin.  OT Low:  "Pt evaluation falls under low complexity for evaluation coding due to performance deficits noted in 1-3 areas as stated above and 0 co-morbities affecting current functional status. Data obtained from problem focused assessments. No modifications or assistance was required for completion of evaluation. Only brief occupational profile and history review completed."     Plan:     Patient to be seen 5 x/week to address the above listed problems via self-care/home management, therapeutic exercises, " therapeutic activities  · Plan of Care Expires: 12/24/18  · Plan of Care Reviewed with: patient    This Plan of care has been discussed with the patient who was involved in its development and understands and is in agreement with the identified goals and treatment plan    GOALS:   Multidisciplinary Problems     Occupational Therapy Goals        Problem: Occupational Therapy Goal    Goal Priority Disciplines Outcome Interventions   Occupational Therapy Goal     OT, PT/OT Ongoing (interventions implemented as appropriate)    Description:  Goals to be met by: 12/24/2018      Patient will increase functional independence with ADLs by performing:    UE Dressing with Modified [place].  LE Dressing with Stand-by Assistance.  Grooming while standing with Supervision.  Toileting from toilet with Stand-by Assistance for hygiene and clothing management.   Sitting at edge of bed x10 minutes with Modified [place].  Step transfer with Supervision  Toilet transfer to toilet with Supervision.  Increased functional strength to WFL for self care.  Upper extremity exercise program x8 reps per handout, with independence.                      Time Tracking:     OT Date of Treatment: 11/24/18  OT Start Time: 1452  OT Stop Time: 1519  OT Total Time (min): 27 min    Billable Minutes:Evaluation 12  Self Care/Home Management 15    Alejandra Gabriel OT  11/24/2018

## 2018-11-25 NOTE — PROGRESS NOTES
"[facility] Internal Medicine Progress Note  Date of Admit: 2018    Subjective:   Pt still complains of dizziness. Denies abd pain, vomiting, changes to BMs, pain anywhere.    Objective:   Last 24 Hour Vital Signs:  BP  Min: 139/83  Max: 171/85  Temp  Av °F (36.7 °C)  Min: 96.8 °F (36 °C)  Max: 98.6 °F (37 °C)  Pulse  Av.3  Min: 65  Max: 80  Resp  Av.3  Min: 16  Max: 20  SpO2  Av.2 %  Min: 96 %  Max: 98 %  Weight  Av.5 kg (186 lb 4.6 oz)  Min: 84.5 kg (186 lb 4.6 oz)  Max: 84.5 kg (186 lb 4.6 oz)  Body mass index is 27.51 kg/m².  I/O last 3 completed shifts:  In: 1966.7 [P.O.:400; I.V.:1416.7; IV Piggyback:150]  Out: 1407 [Urine:1407]    Physical Examination:  BP (!) 151/85 (BP Location: Left arm)   Pulse 72   Temp 96.8 °F (36 °C) (Oral)   Resp 16   Ht 5' 9" (1.753 m)   Wt 84.5 kg (186 lb 4.6 oz)   SpO2 97%   BMI 27.51 kg/m²     General Appearance:    NAD, sleepy but arousable   Head:    Normocephalic, without obvious abnormality, atraumatic   Eyes:    Blind in R eye, EOMI   Nose:   No Rhinorrhea, septum midline, mucosa normal, no drainage    or sinus tenderness   Throat:   MMM   Neck:   Supple, symmetrical, trachea midline, no adenopathy;        thyroid:  No enlargement/tenderness/nodules; no carotid    bruit or JVD   Lungs:     Diffuse wheeze in all lung fields appreciated   Heart:    Irregular rhythm, regular rate   Abdomen:     Soft, non-tender, bowel sounds active all four quadrants,     no masses, hernia repair well healed   Extremities:   Extremities normal, atraumatic, no cyanosis or edema   Pulses:   2+ and symmetric all extremities   Skin:   Skin color, texture, turgor normal, no rashes or lesions   Lymph nodes:   Cervical, supraclavicular, and axillary nodes normal   Neurologic:   Normal strength and sensation throughout     Laboratory:  Most Recent Data:    Trended Lab Data:  Recent Labs   Lab 18  0145 18  0447 18  0418   WBC 7.00 6.17 8.14 "   HGB 11.6* 12.8* 11.8*   HCT 35.8* 39.4* 35.4*    160 211   MCV 89 89 86   RDW 14.9* 14.7* 14.3   * 132* 133*   K 3.6 4.1 4.0    100 101   CO2 21* 24 26   BUN 15 13 17   CREATININE 1.6* 1.6* 1.7*   GLU 88 128* 145*   PROT 6.4 6.6 6.5   ALBUMIN 2.8* 2.8* 2.8*   BILITOT 1.4* 1.2* 1.0   AST 31 30 20   ALKPHOS 57 58 57   ALT 20 17 15       Trended Cardiac Data:  Recent Labs   Lab 11/22/18  1736 11/22/18  1950 11/23/18  0144   TROPONINI 0.053* 0.046* 0.053*   *  --   --        Microbiology Data:  Blood cx - NGTD  Flu - neg  Procal - neg    Other Results:  EKG (my interpretation): atrial fibrillation    Radiology:  Imaging Results          CT Head Without Contrast (Final result)  Result time 11/22/18 18:50:27    Final result by Marc Corea MD (11/22/18 18:50:27)                 Impression:      No acute intracranial abnormalities identified.  No significant detrimental change compared to previous CT head from 01/16/2018, allowing for motion limitations.      Electronically signed by: Marc Corea MD  Date:    11/22/2018  Time:    18:50             Narrative:    EXAMINATION:  CT HEAD WITHOUT CONTRAST    CLINICAL HISTORY:  fall;    TECHNIQUE:  Low dose axial images were obtained through the head.  Coronal and sagittal reformations were also performed. Contrast was not administered.    COMPARISON:  CT head from 01/16/2018.    FINDINGS:  Motion limited examination.  There is generalized cerebral volume loss with chronic microvascular ischemic disease.  No evidence of acute/recent major vascular distribution cerebral infarction, intraparenchymal hemorrhage, or intra-axial space occupying lesion. The ventricular system is normal in size and configuration with no evidence of hydrocephalus. No effacement of the skull-base cisterns. No abnormal extra-axial fluid collections or blood products.  Bilateral maxillary and ethmoid sinus disease is seen.  Partial fluid opacification is visualized within  the bilateral mastoid air cells.  The calvarium shows no significant abnormality.                               X-Ray Chest AP Portable (Final result)  Result time 11/22/18 18:00:00    Final result by Marc Corea MD (11/22/18 18:00:00)                 Impression:      No acute cardiopulmonary process identified.      Electronically signed by: Marc Corea MD  Date:    11/22/2018  Time:    18:00             Narrative:    EXAMINATION:  XR CHEST AP PORTABLE    CLINICAL HISTORY:  Sepsis;    TECHNIQUE:  Single frontal view of the chest was performed.    COMPARISON:  09/17/2018.    FINDINGS:  Cardiac silhouette is normal in size.  Lungs are symmetrically expanded.  Coarse interstitial lung changes are seen.  No evidence of focal consolidative process, pneumothorax, or significant effusion.  No acute osseous abnormality identified.  Remote right rib deformities are visualized with multiple punctate metallic densities again seen projecting over the right hemithorax.                                   Assessment:     Jose Roca is a 78 y.o. AA male with a PMHx of DM2, HTN, HLD, Hep C w/ Cirrhosis, CAD w/ stent placed in late 2000s at OSH, and BPH. The patient presented to [facility] on 11/22/2018 with a primary complaint of falls x 1 day. He was found to be hypoglycemic to 38 via EMS, was given D50 amp and rechecked on arrival and again low at 68 on admit labs. Given usage of long acting insulin and PO meds, placed on D5 drip and admitted to [facility] Med service.      Plan:     Acute Hypoglycemia, suspect medication induced  - 4 days of decreased PO intake, usage of Glimepiride and long acting insulin  - CBG of 38 on EMS check, repeat labs in ED w/ persistent hypoglycemia  - check C-peptide, hold PO antiglycemic meds. SSI + accuchecks  - low suspicion of infectious causes given neg procal, WBCs, lactate  - ischemic w/u as below   - pt placed on D10 continuous at 50mL/hr on 11/22 > BG 120s-220s  11/23  - Will discontinue D5 11/25 and closely monitor BG    Intermediate Troponin  - trop of 0.053 on admission in setting of CKD3, patient denies having chest pain but is a diabetic  - initial EKG w/ baseline abnormalities noted above  - trend EKG, troponins. Stayed stationary at ~0.05. Can consider stress test while inpatient.   - Echo 11/23 LVEF 55%    Paroxysmal Atrial Fibrillation  - chart review has a lone episode of atrial fibrillation noted in post-op period. He was again noted to be in atrial fibrillation during this admission  - echo pending  - CHADSVASC of 5, will discuss starting Eliquis with patient, would like to correct hypoglycemic episodes given risk for falls prior to initiation    DM2  - check HbA1C, now at 5.5  - SSI and accuchecks until hypoglycemia resolves  - discontinue glimepiride on discharge given CKD and hypoglycemic episodes, needs titration of meds by PCP    HTN  - restart home meds as needed, monitor    Urinary incontinence  - per patient, 3 day hx of worsening urinary incontinence. On chart review, patient w/ previous issues with urinary retention. Likely poor bladder function from poor diabetes control  - monitor while inpatient, consider anti-spasmodic. Re-refer to urology on discharge for monitoring.     CAD s/p stent placement in 2010  - per aptient had stent placed in 2010 at hospital in [place]  - no ASA on home meds brought in, low intensity statin  - f/u echo given elevated BNP, may need AICD placed  - optimize medications at discharge    Hep C Cirrhosis  - undergoing outpatient workup per chart review    CKD3  - BUN/Cr at 16/1.6 near prior noted baseline  - suspect 2/2 poor DM and HTN control  - renally dose medications    PPX: Heparin  Diet: Diabetic  Dispo: resolution of hypoglycemia. PT/OT eval    Planning:  May need help with meds, f/u PT/OT evals. Would be good home health candidate.     Code Status:     Full    Palomo Villanueva MD, PGY-2  [facility] Medicine  [facility] Internal  Medicine Service Team B    [facility] Medicine Hospitalist Pager numbers:   [facility] Hospitalist Medicine Team A ([place]/Serg): [phone]  [facility] Hospitalist Medicine Team B ([place]/Dino):  [phone]

## 2018-11-26 ENCOUNTER — TELEPHONE (OUTPATIENT)
Dept: PODIATRY | Facility: CLINIC | Age: 78
End: 2018-11-26

## 2018-11-26 VITALS
WEIGHT: 183 LBS | HEART RATE: 70 BPM | SYSTOLIC BLOOD PRESSURE: 148 MMHG | TEMPERATURE: 98 F | RESPIRATION RATE: 20 BRPM | HEIGHT: 69 IN | BODY MASS INDEX: 27.11 KG/M2 | DIASTOLIC BLOOD PRESSURE: 84 MMHG | OXYGEN SATURATION: 96 %

## 2018-11-26 PROBLEM — E16.2 HYPOGLYCEMIA: Status: RESOLVED | Noted: 2018-11-22 | Resolved: 2018-11-26

## 2018-11-26 LAB
ALBUMIN SERPL BCP-MCNC: 3 G/DL
ALP SERPL-CCNC: 63 U/L
ALT SERPL W/O P-5'-P-CCNC: 15 U/L
ANION GAP SERPL CALC-SCNC: 6 MMOL/L
AST SERPL-CCNC: 18 U/L
BASOPHILS # BLD AUTO: 0.05 K/UL
BASOPHILS NFR BLD: 0.6 %
BILIRUB SERPL-MCNC: 1.1 MG/DL
BUN SERPL-MCNC: 15 MG/DL
CALCIUM SERPL-MCNC: 9.3 MG/DL
CHLORIDE SERPL-SCNC: 100 MMOL/L
CO2 SERPL-SCNC: 29 MMOL/L
CREAT SERPL-MCNC: 1.8 MG/DL
DIFFERENTIAL METHOD: ABNORMAL
EOSINOPHIL # BLD AUTO: 0.4 K/UL
EOSINOPHIL NFR BLD: 5 %
ERYTHROCYTE [DISTWIDTH] IN BLOOD BY AUTOMATED COUNT: 14.1 %
EST. GFR  (AFRICAN AMERICAN): 41 ML/MIN/1.73 M^2
EST. GFR  (NON AFRICAN AMERICAN): 36 ML/MIN/1.73 M^2
GLUCOSE SERPL-MCNC: 117 MG/DL
HCT VFR BLD AUTO: 39.7 %
HGB BLD-MCNC: 13.1 G/DL
LYMPHOCYTES # BLD AUTO: 2.5 K/UL
LYMPHOCYTES NFR BLD: 28.1 %
MCH RBC QN AUTO: 28.5 PG
MCHC RBC AUTO-ENTMCNC: 33 G/DL
MCV RBC AUTO: 86 FL
MONOCYTES # BLD AUTO: 1 K/UL
MONOCYTES NFR BLD: 11.4 %
NEUTROPHILS # BLD AUTO: 4.8 K/UL
NEUTROPHILS NFR BLD: 54.7 %
PLATELET # BLD AUTO: 237 K/UL
PMV BLD AUTO: 9.3 FL
POCT GLUCOSE: 119 MG/DL (ref 70–110)
POCT GLUCOSE: 120 MG/DL (ref 70–110)
POTASSIUM SERPL-SCNC: 4.1 MMOL/L
PROT SERPL-MCNC: 7 G/DL
RBC # BLD AUTO: 4.6 M/UL
SODIUM SERPL-SCNC: 135 MMOL/L
WBC # BLD AUTO: 8.77 K/UL

## 2018-11-26 PROCEDURE — 25000003 PHARM REV CODE 250: Performed by: STUDENT IN AN ORGANIZED HEALTH CARE EDUCATION/TRAINING PROGRAM

## 2018-11-26 PROCEDURE — 63600175 PHARM REV CODE 636 W HCPCS: Performed by: STUDENT IN AN ORGANIZED HEALTH CARE EDUCATION/TRAINING PROGRAM

## 2018-11-26 PROCEDURE — 85025 COMPLETE CBC W/AUTO DIFF WBC: CPT

## 2018-11-26 PROCEDURE — 80053 COMPREHEN METABOLIC PANEL: CPT

## 2018-11-26 PROCEDURE — 36415 COLL VENOUS BLD VENIPUNCTURE: CPT

## 2018-11-26 PROCEDURE — 94761 N-INVAS EAR/PLS OXIMETRY MLT: CPT

## 2018-11-26 PROCEDURE — 97535 SELF CARE MNGMENT TRAINING: CPT

## 2018-11-26 PROCEDURE — A4216 STERILE WATER/SALINE, 10 ML: HCPCS | Performed by: STUDENT IN AN ORGANIZED HEALTH CARE EDUCATION/TRAINING PROGRAM

## 2018-11-26 RX ORDER — LOSARTAN POTASSIUM 100 MG/1
100 TABLET ORAL DAILY
Qty: 90 TABLET | Refills: 3 | Status: SHIPPED | OUTPATIENT
Start: 2018-11-27 | End: 2018-11-26

## 2018-11-26 RX ORDER — LEVOFLOXACIN 750 MG/1
750 TABLET ORAL DAILY
Qty: 1 TABLET | Refills: 0 | Status: SHIPPED | OUTPATIENT
Start: 2018-11-26 | End: 2018-11-27

## 2018-11-26 RX ORDER — LOSARTAN POTASSIUM 100 MG/1
100 TABLET ORAL DAILY
Qty: 90 TABLET | Refills: 3 | Status: SHIPPED | OUTPATIENT
Start: 2018-11-27 | End: 2019-04-08 | Stop reason: SDUPTHER

## 2018-11-26 RX ORDER — LEVOFLOXACIN 750 MG/1
750 TABLET ORAL DAILY
Qty: 1 TABLET | Refills: 0 | Status: SHIPPED | OUTPATIENT
Start: 2018-11-26 | End: 2018-11-26 | Stop reason: SDUPTHER

## 2018-11-26 RX ORDER — INSULIN ASPART 100 [IU]/ML
INJECTION, SOLUTION INTRAVENOUS; SUBCUTANEOUS
Qty: 15 ML | Refills: 3 | Status: SHIPPED | OUTPATIENT
Start: 2018-11-26 | End: 2019-04-08 | Stop reason: SDUPTHER

## 2018-11-26 RX ORDER — INSULIN ASPART 100 [IU]/ML
INJECTION, SOLUTION INTRAVENOUS; SUBCUTANEOUS
Qty: 3 SYRINGE | Refills: 3 | Status: SHIPPED | OUTPATIENT
Start: 2018-11-26 | End: 2018-11-26 | Stop reason: SDUPTHER

## 2018-11-26 RX ADMIN — STANDARDIZED SENNA CONCENTRATE AND DOCUSATE SODIUM 2 TABLET: 8.6; 5 TABLET, FILM COATED ORAL at 09:11

## 2018-11-26 RX ADMIN — LOSARTAN POTASSIUM 100 MG: 50 TABLET, FILM COATED ORAL at 09:11

## 2018-11-26 RX ADMIN — HYDROCHLOROTHIAZIDE 12.5 MG: 12.5 TABLET ORAL at 09:11

## 2018-11-26 RX ADMIN — HEPARIN SODIUM 5000 UNITS: 5000 INJECTION, SOLUTION INTRAVENOUS; SUBCUTANEOUS at 09:11

## 2018-11-26 RX ADMIN — Medication 3 ML: at 06:11

## 2018-11-26 RX ADMIN — ASPIRIN 81 MG: 81 TABLET, COATED ORAL at 09:11

## 2018-11-26 RX ADMIN — POLYETHYLENE GLYCOL 3350 17 G: 17 POWDER, FOR SOLUTION ORAL at 09:11

## 2018-11-26 NOTE — TELEPHONE ENCOUNTER
----- Message from Betty Horton sent at 11/23/2018  4:09 PM CST -----  Contact: self / [phone]  Patient is requesting a call back regarding, he has an appointment, but would like a sooner one. Please advise

## 2018-11-26 NOTE — PLAN OF CARE
Problem: Occupational Therapy Goal  Goal: Occupational Therapy Goal  Goals to be met by: 12/24/2018      Patient will increase functional independence with ADLs by performing:    UE Dressing with Modified [place].  LE Dressing with Stand-by Assistance.--MET 11/26  Grooming while standing with Supervision.  Toileting from toilet with Stand-by Assistance for hygiene and clothing management.--MET 11/26  Sitting at edge of bed x10 minutes with Modified [place].--MET 11/26  Step transfer with Supervision  Toilet transfer to toilet with Supervision.  Increased functional strength to WFL for self care.--MET 11/26  Upper extremity exercise program x8 reps per handout, with independence.     Outcome: Ongoing (interventions implemented as appropriate)  Pt agreeable to OT after max encouragement. Pt perf the following: sup-->EOB w/ S-SBA & mili'd x ~10 min w/ Sup; standing & t/f-->b/s chair via RW w/ CA for balance & Min A for controlled descent; doff/don socks via [employer] at b/s chair level w/ S-MI; attempted to walk pt to bathroom for toileting, however pt stood up impulsively (2/2 urinary urgency) & pushed RW aside to get to toilet w/ CGA & holding onto furniture/fixtures & walls; toilet t/f & toileting w/ SBA; returned to b/s chair w/o RW w/ CGA. Edu/tx re: general safety techs, HEP & energy conservation techs for carryover at home. Pt verbalized understanding. Pt left up in b/s chair w/ alarm & nsg notified.    Pt's urinary urgency & dizziness increases his risk for falls. Pt w/ decreased safety during fxnl mobility via RW. Cont w/ OT per POC for d/c-->home w/ HHOT/PT & BSC.

## 2018-11-26 NOTE — PLAN OF CARE
Plan of Care:    - called by RN that patient now refusing MRI as he recio not have any complaints of dizziness. Per patient, he endorsed dizziness so he would not have to work with PT/OT. He request he be discharged home and that he feels back to his baseline.   - Cancelled Neurology consult and MRI Brain   - Will dc home with PT/OT.     [place] MD  1:25 PM 11/26/2018

## 2018-11-26 NOTE — NURSING
Received call from MRI tech that patient was refusing MRI and that he lied to MD about still being dizzy because he did not want to work with PT.

## 2018-11-26 NOTE — PROGRESS NOTES
"[facility] Internal Medicine Progress Note  Date of Admit: 2018    Subjective:   Pt still complains of dizziness. Denies abd pain, vomiting, changes to BMs, pain anywhere.    Objective:   Last 24 Hour Vital Signs:  BP  Min: 151/87  Max: 169/79  Temp  Av.1 °F (36.7 °C)  Min: 98 °F (36.7 °C)  Max: 98.3 °F (36.8 °C)  Pulse  Av.2  Min: 68  Max: 85  Resp  Av.3  Min: 16  Max: 20  SpO2  Av.2 %  Min: 93 %  Max: 99 %  Weight  Av kg (182 lb 15.7 oz)  Min: 83 kg (182 lb 15.7 oz)  Max: 83 kg (182 lb 15.7 oz)  Body mass index is 27.02 kg/m².  I/O last 3 completed shifts:  In: 1635.8 [P.O.:600; I.V.:885.8; IV Piggyback:150]  Out: 1336 [Urine:1336]    Physical Examination:  BP (!) 153/92 (BP Location: Right arm, Patient Position: Sitting)   Pulse 75   Temp 98.2 °F (36.8 °C) (Oral)   Resp 20   Ht 5' 9" (1.753 m)   Wt 83 kg (182 lb 15.7 oz)   SpO2 (!) 93%   BMI 27.02 kg/m²     General Appearance:    NAD, sleepy but arousable   Head:    Normocephalic, without obvious abnormality, atraumatic   Eyes:    Blind in R eye, EOMI   Nose:   No Rhinorrhea, septum midline, mucosa normal, no drainage    Throat:   MMM   Neck:   Supple, symmetrical, trachea midline, no adenopathy;        no carotid bruit or JVD   Lungs:     Diffuse wheeze in all lung fields appreciated   Heart:    Irregular rhythm, regular rate   Abdomen:     Soft, non-tender, bowel sounds active all four quadrants,     no masses, hernia repair well healed   Extremities:   Extremities normal, atraumatic, no cyanosis or edema   Pulses:   2+ and symmetric    Skin:   Skin color, texture, turgor normal, no rashes or lesions       Neurologic:   Normal strength and sensation throughout     Laboratory:  Most Recent Data:    Trended Lab Data:  Recent Labs   Lab 18  2607 18  0418 18  0445   WBC 6.17 8.14 8.77   HGB 12.8* 11.8* 13.1*   HCT 39.4* 35.4* 39.7*    211 237   MCV 89 86 86   RDW 14.7* 14.3 14.1   * 133* " 135*   K 4.1 4.0 4.1    101 100   CO2 24 26 29   BUN 13 17 15   CREATININE 1.6* 1.7* 1.8*   * 145* 117*   PROT 6.6 6.5 7.0   ALBUMIN 2.8* 2.8* 3.0*   BILITOT 1.2* 1.0 1.1*   AST 30 20 18   ALKPHOS 58 57 63   ALT 17 15 15       Trended Cardiac Data:  Recent Labs   Lab 11/22/18  1736 11/22/18  1950 11/23/18  0144   TROPONINI 0.053* 0.046* 0.053*   *  --   --        Microbiology Data:  Blood cx - NGTD  Flu - neg  Procal - neg    Other Results:  EKG (my interpretation): atrial fibrillation    Radiology:  Imaging Results          CT Head Without Contrast (Final result)  Result time 11/22/18 18:50:27    Final result by Marc Corea MD (11/22/18 18:50:27)                 Impression:      No acute intracranial abnormalities identified.  No significant detrimental change compared to previous CT head from 01/16/2018, allowing for motion limitations.      Electronically signed by: Marc Corea MD  Date:    11/22/2018  Time:    18:50             Narrative:    EXAMINATION:  CT HEAD WITHOUT CONTRAST    CLINICAL HISTORY:  fall;    TECHNIQUE:  Low dose axial images were obtained through the head.  Coronal and sagittal reformations were also performed. Contrast was not administered.    COMPARISON:  CT head from 01/16/2018.    FINDINGS:  Motion limited examination.  There is generalized cerebral volume loss with chronic microvascular ischemic disease.  No evidence of acute/recent major vascular distribution cerebral infarction, intraparenchymal hemorrhage, or intra-axial space occupying lesion. The ventricular system is normal in size and configuration with no evidence of hydrocephalus. No effacement of the skull-base cisterns. No abnormal extra-axial fluid collections or blood products.  Bilateral maxillary and ethmoid sinus disease is seen.  Partial fluid opacification is visualized within the bilateral mastoid air cells.  The calvarium shows no significant abnormality.                               X-Ray  Chest AP Portable (Final result)  Result time 11/22/18 18:00:00    Final result by Marc Corea MD (11/22/18 18:00:00)                 Impression:      No acute cardiopulmonary process identified.      Electronically signed by: Marc Corea MD  Date:    11/22/2018  Time:    18:00             Narrative:    EXAMINATION:  XR CHEST AP PORTABLE    CLINICAL HISTORY:  Sepsis;    TECHNIQUE:  Single frontal view of the chest was performed.    COMPARISON:  09/17/2018.    FINDINGS:  Cardiac silhouette is normal in size.  Lungs are symmetrically expanded.  Coarse interstitial lung changes are seen.  No evidence of focal consolidative process, pneumothorax, or significant effusion.  No acute osseous abnormality identified.  Remote right rib deformities are visualized with multiple punctate metallic densities again seen projecting over the right hemithorax.                                   Assessment:     Jose Roca is a 78 y.o. AA male with a PMHx of DM2, HTN, HLD, Hep C w/ Cirrhosis, CAD w/ stent placed in late 2000s at OSH, and BPH. The patient presented to [facility] on 11/22/2018 with a primary complaint of falls x 1 day. He was found to be hypoglycemic to 38 via EMS, was given D50 amp and rechecked on arrival and again low at 68 on admit labs. Given usage of long acting insulin and PO meds, placed on D5 drip and admitted to [facility] Med service.      Plan:     Acute Hypoglycemia, suspect medication induced  - 4 days of decreased PO intake, usage of Glimepiride and long acting insulin  - CBG of 38 on EMS check, repeat labs in ED w/ persistent hypoglycemia  - check C-peptide, hold PO antiglycemic meds. SSI + accuchecks  - low suspicion of infectious causes given neg procal, WBCs, lactate  - ischemic w/u as below   - pt placed on D10 continuous at 50mL/hr on 11/22 > BG 120s-220s 11/23  - Discontinued D5 11/25 and closely monitor BG    Intermediate Troponin  - trop of 0.053 on admission in setting of  CKD3, patient denies having chest pain but is a diabetic  - initial EKG w/ baseline abnormalities noted above  - trend EKG, troponins. Stayed stationary at ~0.05. Can consider stress test while inpatient.   - Echo 11/23 LVEF 55%    Paroxysmal Atrial Fibrillation  - chart review has a lone episode of atrial fibrillation noted in post-op period. He was again noted to be in atrial fibrillation during this admission  - echo pending  - CHADSVASC of 5, will discuss starting Eliquis with patient, would like to correct hypoglycemic episodes given risk for falls prior to initiation    DM2  - check HbA1C, now at 5.5  - SSI and accuchecks until hypoglycemia resolves  - discontinue glimepiride on discharge given CKD and hypoglycemic episodes, needs titration of meds by PCP    HTN  - restart home meds as needed, monitor    Urinary incontinence  - per patient, 3 day hx of worsening urinary incontinence. On chart review, patient w/ previous issues with urinary retention. Likely poor bladder function from poor diabetes control  - monitor while inpatient, consider anti-spasmodic. Re-refer to urology on discharge for monitoring.     CAD s/p stent placement in 2010  - per aptient had stent placed in 2010 at hospital in [place]  - no ASA on home meds brought in, low intensity statin  - f/u echo given elevated BNP, may need AICD placed  - optimize medications at discharge    Hep C Cirrhosis  - undergoing outpatient workup per chart review    CKD3  - BUN/Cr at 16/1.6 near prior noted baseline  - suspect 2/2 poor DM and HTN control  - renally dose medications    PPX: Heparin  Diet: Diabetic  Dispo: resolution of hypoglycemia. PT/OT eval    Planning:  May need help with meds, home health candidate.     Code Status:     Full    Cece Santana MD PhD  [facility] Internal Medicine  [facility] Internal Medicine Service Team B    [facility] Medicine Hospitalist Pager numbers:   [facility] Hospitalist Medicine Team A (Maya/Serg): [phone]  [facility] Hospitalist  Medicine Team B (Allen/Dino):  [phone]

## 2018-11-26 NOTE — PLAN OF CARE
pt for d/c to home today   HH to be assigned by PHN     BSC to be delivered to pt's home     Future Appointments   Date Time Provider Department Center   11/29/2018 10:15 AM [facility] US1 [facility]   12/14/2018  8:30 AM Naldo Navarro, TOM[facility]S [facility] NAHEED Tariq Osmini     pt has transportation to home   pt had not funds for  meds - CM Supervisor KESHAV Jerry gave permission for OP Pharm to charge CM dept for $9.90 for all four meds        11/26/18 2142   Final Note   Assessment Type Final Discharge Note   Anticipated Discharge Disposition Home-Health  (to be assigned by PHN )   What phone number can be called within the next 1-3 days to see how you are doing after discharge? [phone]   Hospital Follow Up  Appt(s) scheduled? Yes   Discharge plans and expectations educations in teach back method with documentation complete? Yes   Right Care Referral Info   Post Acute Recommendation No Care   Referral Type (home care )   Facility Name (per PHN )

## 2018-11-26 NOTE — PLAN OF CARE
Problem: Patient Care Overview  Goal: Plan of Care Review  Outcome: Ongoing (interventions implemented as appropriate)  Plan of care discussed with patient. IV fluids no longer running. Blood sugar checks are WNL. Telemetry monitored. Patient incontinent, wearing brief. No distress noted. No complaint or needs requested. Patient encouraged to use call light should any needs arise.

## 2018-11-26 NOTE — DISCHARGE SUMMARY
[facility] Internal Medicine Discharge Summary    Primary Team: [facility] Team B  Attending Physician: Alexandre Villa MD  Resident: Reymundo  Intern: Esther       Date of Admit: 11/22/2018  Date of Discharge: 11/26/2018    Discharge to: home  Condition: stable    Discharge Diagnoses     Patient Active Problem List   Diagnosis    Essential hypertension    HLD (hyperlipidemia)    DM type 2 with diabetic peripheral neuropathy    Uncontrolled type 2 diabetes mellitus, with long-term current use of insulin    Chronic hepatitis C with cirrhosis    Liver fibrosis    S/P colectomy    History of hepatitis C; S/p RX with SVR 23 documented 07/2017    Incisional hernia, without obstruction or gangrene    Smoker    Obesity, Class I, BMI 30-34.9    Postoperative urinary retention    Benign prostatic hyperplasia with urinary retention    Back pain with sciatica    Falls    CKD (chronic kidney disease) stage 3, GFR 30-59 ml/min       Consultants and Procedures     Consultants:  None    Procedures:   CXR 11/22: No acute cardiopulmonary process identified.  Head CT 11/22: No acute intracranial abnormalities identified.  No significant detrimental change compared to previous CT head from 01/16/2018, allowing for motion limitations.  Echo: LVEF 55%  Brief History of Present Illness      Joes Roca is a 78 y.o. AA male with a PMHx of DM2, HTN, HLD, Hep C w/ Cirrhosis, CAD w/ stent placed in late 2000s at OSH, and BPH. The patient presented to [facility] on 11/22/2018 with a primary complaint of falls x 1 day.     The patient was in their usual state of health (Able to complete all ADLs, lives with wife) until 4 days ago when he began to notice increasing rhinorrhea, cough, and generalized fatigue. He notes difficulty sleeping 2/2 these complaints. His cough was worsened by throat irritation from what sounds like post-nasal drip which causes him to cough and gag. Yesterday he took his nighttime dose of  insulin and began to notice he grew increasingly fatigue and weak. He fell multiple times. He never checks his sugar at home so he does not know what his values were at the time. He notes that his appetite has been poor 2/2 feeling sick for the past few days. He went to sleep and woke up but remained falling and weak. He endorses cold sweats as well. He took his morning medications including Glimepiride and eventually called the ambulance when his weakness persisted. Of note, he is a chronic tobacco abuser. On ROS he endorses incontinence of urine over the last 1 day. He has a hx of recently dx cirrhosis 2/2 hep C as well as a bout of urinary retention 2/2 BPH 2 months ago after a hernia repair surgery. He denies any chest pain, SOB, back pain, LOC.     For the full HPI please refer to the History & Physical from this admission.    Hospital Course By Problem with Pertinent Findings     1. Acute Hypoglycemia, suspect medication induced  - 4 days of decreased PO intake, usage of Glimepiride and long acting insulin  - CBG of 38 on EMS check, repeat labs in ED w/ persistent hypoglycemia  - Held PO antiglycemic meds. SSI + accuchecks  - low suspicion of infectious causes given neg procal, WBCs, lactate  - ischemic w/u as below   - pt placed on D10 continuous at 50mL/hr on 11/22 > BG 120s-220s 11/23  - Discontinued D5 11/25 and closely monitor BG     2. Intermediate Troponin  - trop of 0.053 on admission in setting of CKD3, patient denies having chest pain but is a diabetic  - initial EKG w/ baseline abnormalities noted above  - trend EKG, troponins. Stayed stationary at ~0.05. Can consider stress test while inpatient.   - Echo 11/23 LVEF 55%     3. Paroxysmal Atrial Fibrillation  - chart review has a lone episode of atrial fibrillation noted in post-op period. He was again noted to be in atrial fibrillation during this admission  - echo pending  - CHADSVASC of 5, will defer discussion regarding starting Eliquis to PCP  "after patient has been working with PT/OT as outpatient.      4. DM2  - check HbA1C, now at 5.5  - SSI and accuchecks until hypoglycemia resolves  - discontinue glimepiride on discharge given CKD and hypoglycemic episodes, needs titration of meds by PCP     5. HTN  - restart home meds as needed, monitor     6, Urinary incontinence  - per patient, 3 day hx of worsening urinary incontinence. On chart review, patient w/ previous issues with urinary retention. Likely poor bladder function from poor diabetes control  - monitor while inpatient, consider anti-spasmodic. Re-refer to urology on discharge for monitoring.      7. CAD s/p stent placement in 2010  - per aptient had stent placed in 2010 at hospital in [place]  - no ASA on home meds brought in, low intensity statin  - f/u echo given elevated BNP, may need AICD placed  - optimize medications at discharge     8. Hep C Cirrhosis  - undergoing outpatient workup per chart review     9. CKD3  - BUN/Cr at 16/1.6 near prior noted baseline  - suspect 2/2 poor DM and HTN control  - renally dose medications    Discharge Medications        Medication List      START taking these medications    BD ULTRA-FINE MINI PEN NEEDLE 31 gauge x 3/16" Ndle  Generic drug:  pen needle, diabetic  USE AS DIRECTED TO INJECT INSULIN     levoFLOXacin 750 MG tablet  Commonly known as:  LEVAQUIN  Take 1 tablet (750 mg total) by mouth once daily. for 1 day     losartan 100 MG tablet  Commonly known as:  COZAAR  Take 1 tablet (100 mg total) by mouth once daily.  Start taking on:  11/27/2018     NovoLOG Flexpen U-100 Insulin 100 unit/mL Inpn pen  Generic drug:  insulin aspart U-100  Give 1-5 units for one hour post prandial glucose measurements greater than 180 on a sliding scale        CONTINUE taking these medications    amLODIPine 10 MG tablet  Commonly known as:  NORVASC  Take 1 tablet (10 mg total) by mouth once daily.     BD ULTRA-FINE SHORT PEN NEEDLE 31 gauge x 5/16" Ndle  Generic drug:  pen " "needle, diabetic     finasteride 5 mg tablet  Commonly known as:  PROSCAR  Take 1 tablet (5 mg total) by mouth once daily.     fluticasone 50 mcg/actuation nasal spray  Commonly known as:  FLONASE  1 spray (50 mcg total) by Each Nare route once daily.     insulin syringe-needle U-100 0.3 mL 30 gauge x 5/16" Syrg     meclizine 25 mg tablet  Commonly known as:  ANTIVERT  Take 1 tablet (25 mg total) by mouth 3 (three) times daily as needed for Dizziness.     polyethylene glycol 17 gram Pwpk  Commonly known as:  GLYCOLAX  Take 17 g by mouth once daily.     senna-docusate 8.6-50 mg 8.6-50 mg per tablet  Commonly known as:  PERICOLACE  Take 2 tablets by mouth 2 (two) times daily.     simvastatin 40 MG tablet  Commonly known as:  ZOCOR  Take 1 tablet (40 mg total) by mouth every evening.     tamsulosin 0.4 mg Cap  Commonly known as:  FLOMAX  Take 1 capsule (0.4 mg total) by mouth every evening.        STOP taking these medications    gabapentin 300 MG capsule  Commonly known as:  NEURONTIN     glimepiride 4 MG tablet  Commonly known as:  AMARYL     insulin glargine 100 unit/mL injection  Commonly known as:  LANTUS     oxyCODONE-acetaminophen 5-325 mg per tablet  Commonly known as:  PERCOCET     SITagliptin 100 MG Tab  Commonly known as:  JANUVIA           Where to Get Your Medications      These medications were sent to [facility]  [address], [place] [address]    Hours:  Mon-Fri, 8a-5:30p Phone:  [phone]   · BD ULTRA-FINE MINI PEN NEEDLE 31 gauge x 3/16" Ndle  · levoFLOXacin 750 MG tablet  · losartan 100 MG tablet  · NovoLOG Flexpen U-100 Insulin 100 unit/mL Inpn pen         Discharge Information:   Diet:  Diabetic    Physical Activity:  As tolerated    Instructions:  1. Take all medications as prescribed  2. Keep all follow-up appointments  3. Return to the hospital or call your primary care physicians if any worsening symptoms such as dizziness, lightheadedness, sweating, nausea, or " vomiting occur.    Follow-Up Appointments:  Please follow up with PCP (Dr. Martha Jamison) within one week    Cece Santana MD PhD  [facility], [facility] [place]

## 2018-11-26 NOTE — NURSING
Pt discharged home accompanied by .  Pt's PIV removed. Discharge information discussed with patient.  BG monitoring and insulin education provided to patient. Patient verbalized understanding.  Prescriptions at bedside from [facility] pharmacy.  Patient brought down in wheelchair.

## 2018-11-26 NOTE — PT/OT/SLP PROGRESS
Occupational Therapy   Treatment    Name: Jose Roca  MRN: [id number]  Admitting Diagnosis:  Hypoglycemia       Recommendations:     Discharge Recommendations: home health OT, home health PT  Discharge Equipment Recommendations:  bedside commode  Barriers to discharge:  Decreased caregiver support    Subjective     Pain/Comfort:  · Pain Rating 1: 0/10  · Pain Rating Post-Intervention 1: 0/10    Objective:     Communicated with: nsg prior to session.  Patient found with  and bed alarm upon OT entry to room.    General Precautions: Standard, fall   Orthopedic Precautions:N/A   Braces: N/A     Occupational Performance:    Bed Mobility:    · Patient completed Supine to Sit with supervision and stand by assistance     Functional Mobility/Transfers:  · Patient completed Sit <> Stand Transfer with contact guard assistance  with  rolling walker   · Patient completed Toilet Transfer Step Transfer technique with stand by assistance with  grab bars  · Functional Mobility: via RW around room for ADLs w/ CGA for balance & Min A for controlled descent    Activities of Daily Living:  · Lower Body Dressing: supervision doff/don socks  · Toileting: stand by assistance on toilet      AMPA 6 Click ADL: 20    Treatment & Education:  Pt agreeable to OT after max encouragement. Pt perf the following: sup-->EOB w/ S-SBA & mili'd x ~10 min w/ Sup; standing & t/f-->b/s chair via RW w/ CA for balance & Min A for controlled descent; doff/don socks via [employer] at b/s chair level w/ S-MI; attempted to walk pt to bathroom for toileting, however pt stood up impulsively (2/2 urinary urgency) & pushed RW aside to get to toilet w/ CGA & holding onto furniture/fixtures & walls; toilet t/f & toileting w/ SBA; returned to b/s chair w/o RW w/ CGA. Edu/tx re: general safety techs, HEP & energy conservation techs for carryover at home. Pt verbalized understanding. Pt left up in b/s chair w/ alarm & nsg notified.      Patient left up in chair with  all lines intact, call button in reach, chair alarm on and nsg notified  Education:    Assessment:   Pt's urinary urgency & dizziness increases his risk for falls. Pt w/ decreased safety during fxnl mobility via RW. Cont w/ OT per POC for d/c-->home w/ HHOT/PT & BSC.    Jose Roca is a 78 y.o. male with a medical diagnosis of Hypoglycemia.  He presents with the following performance deficits affecting function are weakness, impaired endurance, gait instability, impaired functional mobilty, impaired self care skills, impaired balance, decreased lower extremity function, decreased coordination, decreased safety awareness.     Rehab Prognosis:  Good; patient would benefit from acute skilled OT services to address these deficits and reach maximum level of function.       Plan:     Patient to be seen 5 x/week to address the above listed problems via self-care/home management, therapeutic activities, therapeutic exercises  · Plan of Care Expires: 12/24/18  · Plan of Care Reviewed with: patient    This Plan of care has been discussed with the patient who was involved in its development and understands and is in agreement with the identified goals and treatment plan    GOALS:   Multidisciplinary Problems     Occupational Therapy Goals        Problem: Occupational Therapy Goal    Goal Priority Disciplines Outcome Interventions   Occupational Therapy Goal     OT, PT/OT Ongoing (interventions implemented as appropriate)    Description:  Goals to be met by: 12/24/2018      Patient will increase functional independence with ADLs by performing:    UE Dressing with Modified Person.  LE Dressing with Stand-by Assistance.--MET 11/26  Grooming while standing with Supervision.  Toileting from toilet with Stand-by Assistance for hygiene and clothing management.--MET 11/26  Sitting at edge of bed x10 minutes with Modified Person.--MET 11/26  Step transfer with Supervision  Toilet transfer to toilet with  Supervision.  Increased functional strength to WFL for self care.--MET 11/26  Upper extremity exercise program x8 reps per handout, with independence.                       Time Tracking:     OT Date of Treatment: 11/26/18  OT Start Time: 1110  OT Stop Time: 1135  OT Total Time (min): 25 min    Billable Minutes:Self Care/Home Management 25  Total Time 25    CORIE Rivera  11/26/2018

## 2018-11-27 LAB
BACTERIA BLD CULT: NORMAL
BACTERIA BLD CULT: NORMAL

## 2018-11-27 NOTE — PT/OT/SLP DISCHARGE
Physical Therapy Discharge Summary    Name: Jose Roca  MRN: [id number]   Principal Problem: Hypoglycemia     Patient Discharged from acute Physical Therapy on 11/26/2018.  Please refer to prior PT noted date on 11/25/2018 for functional status.     Assessment:     Patient appropriate for care in another setting.    Objective:     GOALS:   Multidisciplinary Problems     Physical Therapy Goals     Not on file          Multidisciplinary Problems (Resolved)        Problem: Physical Therapy Goal    Goal Priority Disciplines Outcome Goal Variances Interventions   Physical Therapy Goal   (Resolved)     PT, PT/OT Outcome(s) achieved     Description:  Goals to be met by: 12/23/18     Patient will increase functional independence with mobility by performing:    Independent bed mobility  SPVN sit <> stand with use of straight cane  Ambulate 150 feet with SPC spvn  Perform LE TE x 15 reps in sitting and standing               Problem: Physical Therapy Goal    Goal Priority Disciplines Outcome Goal Variances Interventions   Physical Therapy Goal   (Resolved)     PT, PT/OT Outcome(s) achieved                     Reasons for Discontinuation of Therapy Services  Transfer to alternate level of care.      Plan:     Patient Discharged to: Home with Home Health Service.    Clement Bernal, PT  11/27/2018

## 2019-04-03 NOTE — ED NOTES
Inpatient consult to Palliative Care  Consult performed by: Russ Morelos RN  Consult ordered by: DEBRA Ramos - CNP  Reason for consult: [address], Code Status, Family Support            Palliative Care Initial Note  Palliative Care Admit date: 4/3/19    Advance Directives:  Pt has executed AD's and designated his spouse as his healthcare proxy. Pt currently has a full code status. We have reviewed the components of resuscitation and pt is aware that he currently has a full code which would remain in place unless he wished to amend. Plan of care/goals:  Met w/ pt and spouse, both of whom clearly have a good understanding of pts status and report having multiple d/w providers. Neither pt nor his spouse \"expected all of this when we came in here almost a month ago. \"   While pt has no misgivings that his cancer is not curable, he states that he \"continues to hold onto hope,\" adding \"however, if they came in and told me that something wasn't going to improve or if my cancer has grown, then I'm not so sure I'd want to keep this up. \"    Mr. Florinda Patiño expressed feeling \"better over the last three or four days but then something else pops up, another problem;\" he is frustrated by that. Pt and spouse are open to the possibility of LTAC @ d/c w/ the goal \"it will eventually get me back home. \"  Pt was pleased w/ being able to eat dinner last renay (hard boiled eggs and toast) and is looking forward to eating after his bronch today. Social/Spiritual:  Ongoing assessment required. Plan:  Maintain full code for now;  Pt/wife receptive to ongoing ACP discussions and support.   Have apprised hospitalist, surg svc, onc svc        Reason for consult:    _X_ Advance Care Planning  ___ Transition of Care Planning  ___ Psychosocial/Spiritual Support  ___ Symptom Management                                                                Russ Morelos RN Pt to Room 11 with c/o hypoglycemia and fall. Per EMS, wife saw the patient fall to the floor and he was unresponsive and unable to get up. Upon EMS arrival, BS was 38, IV accessed and amp of D50 given. After the amp of dextrose was given, patient became more awake and oriented. Second BS check was 178. Pt states he has been falling more than usual, has had a decrease in appetite and has not checked his blood glucose in 3 days. Pt also complains of nausea and intermittent SOB. Pt denies any vomiting, dizziness, weakness, chest pain, vision changes, diarrhea, or constipation. Pt is AAO x 3. Respirations even and unlabored, bilateral course lung sounds. Abdomen soft and non-distended, no guarding or tenderness noted. BS present x 4. Skin warm and dry to touch, no swelling noted. Cardiac monitor and pulse ox applied. Will continue to monitor closely.

## 2019-04-08 ENCOUNTER — HOSPITAL ENCOUNTER (EMERGENCY)
Facility: HOSPITAL | Age: 79
Discharge: HOME | End: 2019-04-08
Attending: EMERGENCY MEDICINE
Payer: MEDICARE

## 2019-04-08 VITALS
RESPIRATION RATE: 20 BRPM | OXYGEN SATURATION: 100 % | SYSTOLIC BLOOD PRESSURE: 142 MMHG | HEIGHT: 69 IN | WEIGHT: 182 LBS | HEART RATE: 78 BPM | DIASTOLIC BLOOD PRESSURE: 86 MMHG | TEMPERATURE: 99 F | BODY MASS INDEX: 26.96 KG/M2

## 2019-04-08 DIAGNOSIS — M79.604 RIGHT LEG PAIN: ICD-10-CM

## 2019-04-08 DIAGNOSIS — S70.01XA CONTUSION OF RIGHT HIP AND THIGH, INITIAL ENCOUNTER: ICD-10-CM

## 2019-04-08 DIAGNOSIS — M79.604 LEG PAIN, LATERAL, RIGHT: ICD-10-CM

## 2019-04-08 DIAGNOSIS — M25.551 RIGHT HIP PAIN: ICD-10-CM

## 2019-04-08 DIAGNOSIS — Z00.8 ENCOUNTER FOR MEDICAL CLEARANCE FOR PATIENT HOLD: ICD-10-CM
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
## 2019-04-08 DIAGNOSIS — M79.651 RIGHT THIGH PAIN: ICD-10-CM

## 2019-04-08 DIAGNOSIS — S70.11XA CONTUSION OF RIGHT HIP AND THIGH, INITIAL ENCOUNTER: ICD-10-CM

## 2019-04-08 LAB
GLUCOSE SERPL-MCNC: 107 MG/DL (ref 70–110)
POCT GLUCOSE: 107 MG/DL (ref 70–110)

## 2019-04-08 PROCEDURE — 99285 EMERGENCY DEPT VISIT HI MDM: CPT | Mod: 25

## 2019-04-08 PROCEDURE — 25000003 PHARM REV CODE 250: Performed by: EMERGENCY MEDICINE

## 2019-04-08 PROCEDURE — 82962 GLUCOSE BLOOD TEST: CPT

## 2019-04-08 RX ORDER — LOSARTAN POTASSIUM 100 MG/1
100 TABLET ORAL DAILY
Qty: 90 TABLET | Refills: 3 | Status: SHIPPED | OUTPATIENT
Start: 2019-04-08 | End: 2021-10-08

## 2019-04-08 RX ORDER — HYDROCODONE BITARTRATE AND ACETAMINOPHEN 5; 325 MG/1; MG/1
1 TABLET ORAL EVERY 6 HOURS PRN
Qty: 20 TABLET | Refills: 0 | Status: SHIPPED | OUTPATIENT
Start: 2019-04-08 | End: 2019-04-18

## 2019-04-08 RX ORDER — METFORMIN HYDROCHLORIDE 1000 MG/1
1000 TABLET ORAL 2 TIMES DAILY WITH MEALS
Qty: 60 TABLET | Refills: 3 | Status: SHIPPED | OUTPATIENT
Start: 2019-04-08 | End: 2021-10-08

## 2019-04-08 RX ORDER — SIMVASTATIN 40 MG/1
40 TABLET, FILM COATED ORAL NIGHTLY
Qty: 30 TABLET | Refills: 5 | Status: SHIPPED | OUTPATIENT
Start: 2019-04-08 | End: 2019-04-17 | Stop reason: SDUPTHER

## 2019-04-08 RX ORDER — METFORMIN HYDROCHLORIDE 1000 MG/1
1000 TABLET ORAL 2 TIMES DAILY WITH MEALS
COMMUNITY
End: 2019-04-08 | Stop reason: SDUPTHER

## 2019-04-08 RX ORDER — AMLODIPINE BESYLATE 10 MG/1
10 TABLET ORAL DAILY
Qty: 30 TABLET | Refills: 5 | Status: SHIPPED | OUTPATIENT
Start: 2019-04-08 | End: 2020-04-07

## 2019-04-08 RX ORDER — HYDROCODONE BITARTRATE AND ACETAMINOPHEN 5; 325 MG/1; MG/1
2 TABLET ORAL
Status: COMPLETED | OUTPATIENT
Start: 2019-04-08 | End: 2019-04-08

## 2019-04-08 RX ORDER — INSULIN ASPART 100 [IU]/ML
INJECTION, SOLUTION INTRAVENOUS; SUBCUTANEOUS
Qty: 15 ML | Refills: 3 | Status: SHIPPED | OUTPATIENT
Start: 2019-04-08 | End: 2022-03-28

## 2019-04-08 RX ADMIN — HYDROCODONE BITARTRATE AND ACETAMINOPHEN 2 TABLET: 5; 325 TABLET ORAL at 08:04

## 2019-04-08 NOTE — ED TRIAGE NOTES
Pt presents to ED with c/o right leg pain for two weeks. Pt states the pain is from his hip to his foot. Denies numbness/tingling. He has tried using a heating pad without relief. Pt states he hasn't slept in two weeks because of the pain and that his appetite has been decreased because of the pain as well. Pt in no acute distress.

## 2019-04-08 NOTE — ED NOTES
Pt's danielle Castellanos - [phone]    Doctor's number  TERRENCE Garza - appointment on the April 12th  [phone]
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
## 2019-04-08 NOTE — ED NOTES
"Patient lives with niece. Patient's niece called to inform that she would not be accepting the patient back home after his ED visit this morning as she is unable to take care of him any longer. Pt's niece provided the pt's doctor's number and asked for "rehab" to be arranged so that the patient can go there, instead of back to her house. MD informed.   "

## 2019-04-08 NOTE — PROGRESS NOTES
IZZY sent pt referral to [facility], [facility] contacted IZZY and spoke to pt, awaiting final approval from Emmy.

## 2019-04-08 NOTE — PROGRESS NOTES
IZZY spoke with pt Danielle Lancaster at [phone], she stated she can't care for her uncle any longer. She stated she isn't well and has to work. She stated he can not come back to her home. Pt danielle stated she wants him to go into a Nursing Home.

## 2019-04-08 NOTE — DISCHARGE INSTRUCTIONS
Please use a heating pad and rest.  Hydrocodone for pain. Please follow-up with your orthopedist, or the orthopedist above this week.  Rest.  Return immediately if you get worse or if new problems develop.

## 2019-04-08 NOTE — PROGRESS NOTES
IZZY spoke with Emmy at [facility], she stated the Nurse can call report to [phone](Mackenzie), the pt will go to room 221. IZZY notified Nurse Lenora to call report. IZZY scheduled transportation. ADT 30 order placed for  Transportation.  ETA:5:00pm  If transportation does not arrive at ETA time nurse will be instructed to follow protocol for transportation below:  How can I get in touch directly with dispatch, if needed?                 Non-emergent dispatch: [phone]                                    Emergent dispatch: [phone]  Non-emergent (stretcher): [phone]  Escalation Needs (PFC Lead): [phone]

## 2019-04-17 ENCOUNTER — OFFICE VISIT (OUTPATIENT)
Dept: ORTHOPEDICS | Facility: CLINIC | Age: 79
End: 2019-04-17
Payer: MEDICARE

## 2019-04-17 VITALS
WEIGHT: 163.94 LBS | HEART RATE: 80 BPM | SYSTOLIC BLOOD PRESSURE: 121 MMHG | DIASTOLIC BLOOD PRESSURE: 71 MMHG | HEIGHT: 69 IN | BODY MASS INDEX: 24.28 KG/M2

## 2019-04-17 DIAGNOSIS — M17.11 PRIMARY OSTEOARTHRITIS OF RIGHT KNEE: ICD-10-CM

## 2019-04-17 DIAGNOSIS — G89.29 CHRONIC PAIN OF RIGHT KNEE: Primary | ICD-10-CM

## 2019-04-17 DIAGNOSIS — M25.561 CHRONIC PAIN OF RIGHT KNEE: Primary | ICD-10-CM

## 2019-04-17 PROCEDURE — 3074F SYST BP LT 130 MM HG: CPT | Mod: CPTII,S$GLB,, | Performed by: PHYSICIAN ASSISTANT

## 2019-04-17 PROCEDURE — 1101F PR PT FALLS ASSESS DOC 0-1 FALLS W/OUT INJ PAST YR: ICD-10-PCS | Mod: CPTII,S$GLB,, | Performed by: PHYSICIAN ASSISTANT

## 2019-04-17 PROCEDURE — 3078F DIAST BP <80 MM HG: CPT | Mod: CPTII,S$GLB,, | Performed by: PHYSICIAN ASSISTANT

## 2019-04-17 PROCEDURE — 99999 PR PBB SHADOW E&M-EST. PATIENT-LVL IV: CPT | Mod: PBBFAC,,, | Performed by: PHYSICIAN ASSISTANT

## 2019-04-17 PROCEDURE — 3074F PR MOST RECENT SYSTOLIC BLOOD PRESSURE < 130 MM HG: ICD-10-PCS | Mod: CPTII,S$GLB,, | Performed by: PHYSICIAN ASSISTANT

## 2019-04-17 PROCEDURE — 99999 PR PBB SHADOW E&M-EST. PATIENT-LVL IV: ICD-10-PCS | Mod: PBBFAC,,, | Performed by: PHYSICIAN ASSISTANT

## 2019-04-17 PROCEDURE — 99203 OFFICE O/P NEW LOW 30 MIN: CPT | Mod: S$GLB,,, | Performed by: PHYSICIAN ASSISTANT

## 2019-04-17 PROCEDURE — 1101F PT FALLS ASSESS-DOCD LE1/YR: CPT | Mod: CPTII,S$GLB,, | Performed by: PHYSICIAN ASSISTANT

## 2019-04-17 PROCEDURE — 3078F PR MOST RECENT DIASTOLIC BLOOD PRESSURE < 80 MM HG: ICD-10-PCS | Mod: CPTII,S$GLB,, | Performed by: PHYSICIAN ASSISTANT

## 2019-04-17 PROCEDURE — 99203 PR OFFICE/OUTPT VISIT, NEW, LEVL III, 30-44 MIN: ICD-10-PCS | Mod: S$GLB,,, | Performed by: PHYSICIAN ASSISTANT

## 2019-04-17 RX ORDER — LOSARTAN POTASSIUM 100 MG/1
100 TABLET ORAL
COMMUNITY
Start: 2018-11-27 | End: 2019-11-27

## 2019-04-17 RX ORDER — PEN NEEDLE, DIABETIC 30 GX3/16"
NEEDLE, DISPOSABLE MISCELLANEOUS
COMMUNITY
Start: 2016-10-21

## 2019-04-17 RX ORDER — OXYCODONE AND ACETAMINOPHEN 5; 325 MG/1; MG/1
TABLET ORAL
COMMUNITY
Start: 2019-04-11 | End: 2021-10-08

## 2019-04-17 RX ORDER — FINASTERIDE 5 MG/1
5 TABLET, FILM COATED ORAL
COMMUNITY
Start: 2018-09-24 | End: 2021-10-08

## 2019-04-17 RX ORDER — INSULIN ASPART 100 [IU]/ML
INJECTION, SOLUTION INTRAVENOUS; SUBCUTANEOUS
COMMUNITY
Start: 2018-11-26 | End: 2021-10-08 | Stop reason: DRUGHIGH

## 2019-04-17 RX ORDER — SIMVASTATIN 40 MG/1
40 TABLET, FILM COATED ORAL
COMMUNITY
Start: 2016-09-27 | End: 2021-10-08

## 2019-04-17 RX ORDER — HYDROCODONE BITARTRATE AND ACETAMINOPHEN 10; 325 MG/1; MG/1
TABLET ORAL
COMMUNITY
Start: 2019-03-20 | End: 2021-10-08

## 2019-04-17 RX ORDER — AMLODIPINE BESYLATE 10 MG/1
10 TABLET ORAL
COMMUNITY
Start: 2016-09-27 | End: 2021-10-08 | Stop reason: DRUGHIGH

## 2019-04-17 RX ORDER — LOSARTAN POTASSIUM AND HYDROCHLOROTHIAZIDE 12.5; 1 MG/1; MG/1
TABLET ORAL
COMMUNITY
Start: 2019-02-12 | End: 2021-10-08

## 2019-04-17 RX ORDER — TAMSULOSIN HYDROCHLORIDE 0.4 MG/1
0.4 CAPSULE ORAL
COMMUNITY
Start: 2018-09-07 | End: 2019-04-17 | Stop reason: SDUPTHER

## 2019-04-17 RX ORDER — AMOXICILLIN 250 MG
2 CAPSULE ORAL
COMMUNITY
Start: 2018-09-07 | End: 2021-10-08

## 2019-04-17 NOTE — PROGRESS NOTES
"  SUBJECTIVE:     Chief Complaint & History of Present Illness:  Jose Roca is a New patient 78 y.o. male who is seen here today with a complaint of    Chief Complaint   Patient presents with    Right Hip - Pain    Right Lower Leg - Pain    .  Patient is here today for continuing care for right lower extremity pain following a fall 0 3/17/2019.  Last seen and treated in the emergency room for this condition 04/08/2019 he has continued to struggle with intermittent problems with soreness and pain in the right knee and to a lesser degree the hip. He comes to the clinic today stating that the pain in his hip is essentially resolved but still has trouble soreness and pain in the knee particularly with standing ambulation start-up pain..  He denies locking catching or giving way he had some mild intermittent swelling is relieved with the rest and elevation.  On a scale of 1-10, with 10 being worst pain imaginable, he rates this pain as 3 on good days and 6 on bad days.  he describes the pain as sore.    Review of patient's allergies indicates:  No Known Allergies      Current Outpatient Medications   Medication Sig Dispense Refill    amLODIPine (NORVASC) 10 MG tablet Take 1 tablet (10 mg total) by mouth once daily. 30 tablet 5    amLODIPine (NORVASC) 10 MG tablet Take 10 mg by mouth.      BD INSULIN PEN NEEDLE UF SHORT 31 gauge x 5/16" Ndle       fluticasone (FLONASE) 50 mcg/actuation nasal spray 1 spray (50 mcg total) by Each Nare route once daily. 15 g 0    insulin aspart U-100 (NOVOLOG FLEXPEN U-100 INSULIN) 100 unit/mL InPn pen Give 1-5 units for one hour post prandial glucose measurements greater than 180 on a sliding scale 15 mL 3    insulin aspart U-100 (NOVOLOG) 100 unit/mL InPn pen Give 1-5 units for one hour post prandial glucose measurements greater than 180 on a sliding scale      insulin syringe-needle U-100 0.3 mL 30 gauge x 5/16 Syrg       losartan (COZAAR) 100 MG tablet Take 1 tablet (100 mg " "total) by mouth once daily. 90 tablet 3    losartan (COZAAR) 100 MG tablet Take 100 mg by mouth.      losartan-hydrochlorothiazide 100-12.5 mg (HYZAAR) 100-12.5 mg Tab       metFORMIN (GLUCOPHAGE) 1000 MG tablet Take 1 tablet (1,000 mg total) by mouth 2 (two) times daily with meals. 60 tablet 3    oxyCODONE-acetaminophen (PERCOCET) 5-325 mg per tablet       pen needle, diabetic (BD ULTRA-FINE MINI PEN NEEDLE) 31 gauge x 3/16" Ndle USE AS DIRECTED TO INJECT INSULIN 100 each 3    pen needle, diabetic 31 gauge x 5/16" Ndle       simvastatin (ZOCOR) 40 MG tablet Take 40 mg by mouth.      SITagliptin (JANUVIA) 50 MG Tab Take 2 tablets (100 mg total) by mouth once daily. 30 tablet 3    tamsulosin (FLOMAX) 0.4 mg Cap Take 1 capsule (0.4 mg total) by mouth every evening. 30 capsule 0    finasteride (PROSCAR) 5 mg tablet Take 1 tablet (5 mg total) by mouth once daily. 30 tablet 11    finasteride (PROSCAR) 5 mg tablet Take 5 mg by mouth.      HYDROcodone-acetaminophen (NORCO)  mg per tablet       HYDROcodone-acetaminophen (NORCO) 5-325 mg per tablet Take 1 tablet by mouth every 6 (six) hours as needed for Pain. 20 tablet 0    meclizine (ANTIVERT) 25 mg tablet Take 1 tablet (25 mg total) by mouth 3 (three) times daily as needed for Dizziness. 20 tablet 0    polyethylene glycol (GLYCOLAX) 17 gram PwPk Take 17 g by mouth once daily. 10 packet 0    senna-docusate 8.6-50 mg (PERICOLACE) 8.6-50 mg per tablet Take 2 tablets by mouth 2 (two) times daily.      senna-docusate 8.6-50 mg (PERICOLACE) 8.6-50 mg per tablet Take 2 tablets by mouth.       No current facility-administered medications for this visit.        Past Medical History:   Diagnosis Date    Diabetes mellitus, type 2     High cholesterol     History of hepatitis C; S/p RX with SVR 23 documented 07/2017 7/18/2017    Hypertension        Past Surgical History:   Procedure Laterality Date    CHOLECYSTECTOMY      CORONARY STENT PLACEMENT      " "EXPLORATORY LAPAROTOMY W/ BOWEL RESECTION  09/19/2016    ileocecectomy    EXPLORATORY-LAPAROTOMY N/A 9/19/2016    [facility]    ILEOCECECTOMY  9/19/2016    [facility]    LYSIS, ADHESIONS N/A 9/5/2018    [facility] OR    OMENTECTOMY N/A 9/5/2018    [facility] at [facility] OR    REPAIR,HERNIA,INCISIONAL OR VENTRAL,WITHOUT HISTORY OF PRIOR REPAIR(MIDLINE HERNIA DEFECT 6CMx3.6CM) (LEFT LATERAL HERNIA DEFECT 4.2CKy4VL) (HYBRID) N/A 9/5/2018    Performed by [facility] at [facility] OR       Vital Signs (Most Recent)  Vitals:    04/17/19 0839   BP: 121/71   Pulse: 80           Review of Systems:  ROS:  Constitutional: no fever or chills  Eyes: no visual changes  ENT: no nasal congestion or sore throat  Respiratory: no cough or shortness of breath  Cardiovascular: no chest pain or palpitations  Gastrointestinal: no nausea or vomiting, tolerating diet, Positive for hepatic liver cirrhosis, liver fibrosis, status post colectomy  Genitourinary: no hematuria or dysuria, Positive CKD stage 3, hyperkalemia  Integument/Breast: no rash or pruritis  Hematologic/Lymphatic: no easy bruising or lymphadenopathy, Positive hepatitis-C  Musculoskeletal: no arthralgias or myalgias, Positive chronic low back pain with sciatica  Neurological: no seizures or tremors, Positive for diabetic polyneuropathy  Behavioral/Psych: no auditory or visual hallucinations  Endocrine: no heat or cold intolerance, Positive diabetes type 2 poorly controlled                OBJECTIVE:     PHYSICAL EXAM:  Height: 5' 9" (175.3 cm) Weight: 74.3 kg (163 lb 14.6 oz), General Appearance: Well nourished, well developed, in no acute distress.  Neurological: Mood & affect are normal.  right  Knee Exam:  Knee Range of Motion:0-120 degrees flexion   Effusion:  Mild  Condition of skin:intact  Location of tenderness:Lateral joint line "   Strength:limited by pain and 5 of 5  Stability:  Lachman: stable, LCL: stable, MCL: stable, PCL: stable and posteromedial (dial): stable  Varus /Valgus stress:  normal  Valerie:   negative/negative    Hip Examination:  positives: tenderness over greater trochanter and negatives: FROM  no pain with heel impact  pulses full    RADIOGRAPHS:  X-rays of hip and knee from previous visit reviewed by me today demonstrate no evidence of fracture dislocation either joint mild arthritic changes noted within the knee with medial joint space narrowing and early sclerotic changes no marked osteophytic spurring no fracture dislocation or other bony abnormalities    ASSESSMENT/PLAN:       ICD-10-CM ICD-9-CM   1. Chronic pain of right knee M25.561 719.46    G89.29 338.29   2. Primary osteoarthritis of right knee M17.11 715.16       Plan: We discussed with the patient at length all the different treatment options available for  the knee including anti-inflammatories, acetaminophen, rest, ice, knee strengthening exercise, occasional cortisone injections for temporary relief, Viscosupplimentation injections, arthroscopic debridement osteotomy, and finally knee arthroplasty.   Patient is adamantly opposed to any injection therapies.  Hinged knee brace to the right knee for support and protection.  Patient tried the brace and ambulated states it makes him feel very much better we will continue with the brace follow-up in 3 weeks if symptoms not resolved sooner as symptoms dictate

## 2019-04-17 NOTE — LETTER
April 17, 2019      Juanjose Rosenberg MD  [address]  [address]  [place] [address]           [facility] - Orthoped[facility]  [address], [address]th [address]  [facility] [address]  Phone: [phone]          Patient: Jose Roca   MR Number: [id number]   YOB: 1940   Date of Visit: 4/17/2019       Dear Dr. Juanjose Rosenberg:    Thank you for referring Jose Roca to me for evaluation. Attached you will find relevant portions of my assessment and plan of care.    If you have questions, please do not hesitate to call me. I look forward to following Jose Roca along with you.    Sincerely,    Franklin Urias PA-C    Enclosure  CC:  No Recipients    If you would like to receive this communication electronically, please contact [email or web address] or [phone] to request more information on [employer] access.    For providers and/or their staff who would like to refer a patient to [facility], please contact us through our one-stop-shop provider referral line, [facility] , at [phone].    If you feel you have received this communication in error or would no longer like to receive these types of communications, please e-mail [email or web address]

## 2020-05-28 ENCOUNTER — LAB VISIT (OUTPATIENT)
Dept: LAB | Facility: HOSPITAL | Age: 80
End: 2020-05-28
Payer: MEDICARE

## 2020-05-28 DIAGNOSIS — Z01.84 ANTIBODY RESPONSE EXAMINATION: ICD-10-CM

## 2020-05-28 PROCEDURE — 36415 COLL VENOUS BLD VENIPUNCTURE: CPT

## 2020-05-28 PROCEDURE — 86769 SARS-COV-2 COVID-19 ANTIBODY: CPT

## 2020-05-29 LAB — SARS-COV-2 IGG SERPLBLD QL IA.RAPID: NEGATIVE

## 2020-06-12 ENCOUNTER — LAB VISIT (OUTPATIENT)
Dept: LAB | Facility: OTHER | Age: 80
End: 2020-06-12
Payer: MEDICARE

## 2020-06-12 DIAGNOSIS — Z20.822 SUSPECTED COVID-19 VIRUS INFECTION: ICD-10-CM

## 2020-06-12 PROCEDURE — U0003 INFECTIOUS AGENT DETECTION BY NUCLEIC ACID (DNA OR RNA); SEVERE ACUTE RESPIRATORY SYNDROME CORONAVIRUS 2 (SARS-COV-2) (CORONAVIRUS DISEASE [COVID-19]), AMPLIFIED PROBE TECHNIQUE, MAKING USE OF HIGH THROUGHPUT TECHNOLOGIES AS DESCRIBED BY CMS-2020-01-R: HCPCS

## 2020-06-16 LAB — SARS-COV-2 RNA RESP QL NAA+PROBE: NOT DETECTED

## 2020-06-19 ENCOUNTER — LAB VISIT (OUTPATIENT)
Dept: LAB | Facility: OTHER | Age: 80
End: 2020-06-19
Payer: MEDICARE

## 2020-06-19 DIAGNOSIS — Z20.822 SUSPECTED COVID-19 VIRUS INFECTION: ICD-10-CM

## 2020-06-19 PROCEDURE — U0003 INFECTIOUS AGENT DETECTION BY NUCLEIC ACID (DNA OR RNA); SEVERE ACUTE RESPIRATORY SYNDROME CORONAVIRUS 2 (SARS-COV-2) (CORONAVIRUS DISEASE [COVID-19]), AMPLIFIED PROBE TECHNIQUE, MAKING USE OF HIGH THROUGHPUT TECHNOLOGIES AS DESCRIBED BY CMS-2020-01-R: HCPCS

## 2020-06-20 LAB — SARS-COV-2 RNA RESP QL NAA+PROBE: NOT DETECTED

## 2020-06-26 ENCOUNTER — LAB VISIT (OUTPATIENT)
Dept: LAB | Facility: OTHER | Age: 80
End: 2020-06-26
Payer: MEDICARE

## 2020-06-26 DIAGNOSIS — Z20.822 SUSPECTED COVID-19 VIRUS INFECTION: ICD-10-CM

## 2020-06-26 PROCEDURE — U0003 INFECTIOUS AGENT DETECTION BY NUCLEIC ACID (DNA OR RNA); SEVERE ACUTE RESPIRATORY SYNDROME CORONAVIRUS 2 (SARS-COV-2) (CORONAVIRUS DISEASE [COVID-19]), AMPLIFIED PROBE TECHNIQUE, MAKING USE OF HIGH THROUGHPUT TECHNOLOGIES AS DESCRIBED BY CMS-2020-01-R: HCPCS | Mod: ST72

## 2020-07-01 LAB — SARS-COV-2 RNA RESP QL NAA+PROBE: NOT DETECTED

## 2020-07-03 ENCOUNTER — LAB VISIT (OUTPATIENT)
Dept: LAB | Facility: OTHER | Age: 80
End: 2020-07-03
Attending: INTERNAL MEDICINE
Payer: MEDICARE

## 2020-07-03 DIAGNOSIS — Z20.822 SUSPECTED COVID-19 VIRUS INFECTION: ICD-10-CM

## 2020-07-03 PROCEDURE — U0003 INFECTIOUS AGENT DETECTION BY NUCLEIC ACID (DNA OR RNA); SEVERE ACUTE RESPIRATORY SYNDROME CORONAVIRUS 2 (SARS-COV-2) (CORONAVIRUS DISEASE [COVID-19]), AMPLIFIED PROBE TECHNIQUE, MAKING USE OF HIGH THROUGHPUT TECHNOLOGIES AS DESCRIBED BY CMS-2020-01-R: HCPCS | Mod: ST72

## 2020-07-09 LAB — SARS-COV-2 RNA RESP QL NAA+PROBE: NOT DETECTED

## 2020-07-10 ENCOUNTER — LAB VISIT (OUTPATIENT)
Dept: LAB | Facility: OTHER | Age: 80
End: 2020-07-10
Payer: MEDICARE

## 2020-07-10 DIAGNOSIS — Z20.822 SUSPECTED COVID-19 VIRUS INFECTION: ICD-10-CM

## 2020-07-10 PROCEDURE — U0003 INFECTIOUS AGENT DETECTION BY NUCLEIC ACID (DNA OR RNA); SEVERE ACUTE RESPIRATORY SYNDROME CORONAVIRUS 2 (SARS-COV-2) (CORONAVIRUS DISEASE [COVID-19]), AMPLIFIED PROBE TECHNIQUE, MAKING USE OF HIGH THROUGHPUT TECHNOLOGIES AS DESCRIBED BY CMS-2020-01-R: HCPCS | Mod: ST72

## 2020-07-15 LAB — SARS-COV-2 RNA RESP QL NAA+PROBE: NEGATIVE

## 2020-07-17 ENCOUNTER — LAB VISIT (OUTPATIENT)
Dept: LAB | Facility: OTHER | Age: 80
End: 2020-07-17
Payer: MEDICARE

## 2020-07-17 DIAGNOSIS — Z20.822 SUSPECTED COVID-19 VIRUS INFECTION: ICD-10-CM

## 2020-07-17 DIAGNOSIS — Z03.818 ENCOUNTER FOR OBSERVATION FOR SUSPECTED EXPOSURE TO OTHER BIOLOGICAL AGENTS RULED OUT: ICD-10-CM

## 2020-07-17 PROCEDURE — U0003 INFECTIOUS AGENT DETECTION BY NUCLEIC ACID (DNA OR RNA); SEVERE ACUTE RESPIRATORY SYNDROME CORONAVIRUS 2 (SARS-COV-2) (CORONAVIRUS DISEASE [COVID-19]), AMPLIFIED PROBE TECHNIQUE, MAKING USE OF HIGH THROUGHPUT TECHNOLOGIES AS DESCRIBED BY CMS-2020-01-R: HCPCS

## 2020-07-22 LAB — SARS-COV-2 RNA RESP QL NAA+PROBE: NEGATIVE

## 2021-05-31 ENCOUNTER — DOCUMENTATION ONLY (OUTPATIENT)
Dept: PALLIATIVE MEDICINE | Facility: CLINIC | Age: 81
End: 2021-05-31

## 2021-05-31 DIAGNOSIS — M86.172 OSTEOMYELITIS OF ANKLE OR FOOT, ACUTE, LEFT: Primary | ICD-10-CM

## 2021-05-31 RX ORDER — OXYCODONE HYDROCHLORIDE 5 MG/1
5 TABLET ORAL EVERY 6 HOURS PRN
Qty: 60 TABLET | Refills: 0 | Status: SHIPPED | OUTPATIENT
Start: 2021-05-31 | End: 2021-06-30

## 2021-10-08 ENCOUNTER — HOSPITAL ENCOUNTER (EMERGENCY)
Facility: HOSPITAL | Age: 81
Discharge: HOME | End: 2021-10-08
Attending: EMERGENCY MEDICINE
Payer: MEDICARE

## 2021-10-08 VITALS
TEMPERATURE: 98 F | HEART RATE: 52 BPM | HEIGHT: 69 IN | OXYGEN SATURATION: 94 % | SYSTOLIC BLOOD PRESSURE: 189 MMHG | WEIGHT: 165 LBS | RESPIRATION RATE: 15 BRPM | BODY MASS INDEX: 24.44 KG/M2 | DIASTOLIC BLOOD PRESSURE: 86 MMHG

## 2021-10-08 DIAGNOSIS — M79.89 LEFT ARM SWELLING: Primary | ICD-10-CM

## 2021-10-08 PROCEDURE — 99284 EMERGENCY DEPT VISIT MOD MDM: CPT | Mod: 25

## 2021-10-08 RX ORDER — TORSEMIDE 20 MG/1
20 TABLET ORAL EVERY MORNING
COMMUNITY

## 2021-10-08 RX ORDER — AMILORIDE HYDROCHLORIDE 5 MG/1
5 TABLET ORAL DAILY
COMMUNITY
End: 2022-03-28

## 2021-10-08 RX ORDER — ATORVASTATIN CALCIUM 20 MG/1
10 TABLET, FILM COATED ORAL NIGHTLY
COMMUNITY
End: 2022-03-29 | Stop reason: DRUGHIGH

## 2021-10-08 RX ORDER — ASPIRIN 81 MG/1
81 TABLET ORAL DAILY
COMMUNITY
End: 2022-03-28

## 2021-10-08 RX ORDER — ACETAMINOPHEN, DIPHENHYDRAMINE HCL, PHENYLEPHRINE HCL 325; 25; 5 MG/1; MG/1; MG/1
10 TABLET ORAL NIGHTLY PRN
COMMUNITY
End: 2022-03-28

## 2021-10-08 RX ORDER — FERROUS GLUCONATE 324(38)MG
324 TABLET ORAL DAILY
COMMUNITY
End: 2022-03-28

## 2021-10-08 RX ORDER — TAMSULOSIN HYDROCHLORIDE 0.4 MG/1
0.4 CAPSULE ORAL NIGHTLY
COMMUNITY
End: 2022-03-29 | Stop reason: DRUGHIGH

## 2021-10-08 RX ORDER — GABAPENTIN 300 MG/1
300 CAPSULE ORAL 3 TIMES DAILY
COMMUNITY
End: 2022-03-28

## 2021-10-08 RX ORDER — HYDRALAZINE HYDROCHLORIDE 50 MG/1
50 TABLET, FILM COATED ORAL EVERY 8 HOURS
COMMUNITY
End: 2022-03-29 | Stop reason: SDUPTHER

## 2021-10-08 RX ORDER — ACETAMINOPHEN 325 MG/1
650 TABLET ORAL EVERY 4 HOURS PRN
COMMUNITY
End: 2022-03-29

## 2021-10-08 RX ORDER — MAG HYDROX/ALUMINUM HYD/SIMETH 200-200-20
30 SUSPENSION, ORAL (FINAL DOSE FORM) ORAL 2 TIMES DAILY PRN
COMMUNITY
End: 2022-03-29

## 2021-10-08 RX ORDER — AMLODIPINE BESYLATE 5 MG/1
5 TABLET ORAL DAILY
COMMUNITY

## 2021-10-08 RX ORDER — OXYCODONE HYDROCHLORIDE 5 MG/1
2.5 TABLET ORAL EVERY 6 HOURS PRN
COMMUNITY
End: 2022-03-28

## 2022-03-28 ENCOUNTER — HOSPITAL ENCOUNTER (EMERGENCY)
Facility: HOSPITAL | Age: 82
End: 2022-03-28
Attending: EMERGENCY MEDICINE
Payer: MEDICARE

## 2022-03-28 VITALS
SYSTOLIC BLOOD PRESSURE: 148 MMHG | TEMPERATURE: 98 F | BODY MASS INDEX: 27.86 KG/M2 | RESPIRATION RATE: 16 BRPM | OXYGEN SATURATION: 99 % | WEIGHT: 199 LBS | DIASTOLIC BLOOD PRESSURE: 64 MMHG | HEART RATE: 68 BPM | HEIGHT: 71 IN

## 2022-03-28 DIAGNOSIS — S00.03XA CONTUSION OF SCALP, INITIAL ENCOUNTER: Primary | ICD-10-CM
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
## 2022-03-28 DIAGNOSIS — M25.512 ACUTE PAIN OF LEFT SHOULDER: ICD-10-CM

## 2022-03-28 LAB — POCT GLUCOSE: 126 MG/DL (ref 70–110)

## 2022-03-28 PROCEDURE — 99284 EMERGENCY DEPT VISIT MOD MDM: CPT | Mod: 25

## 2022-03-28 PROCEDURE — 82962 GLUCOSE BLOOD TEST: CPT

## 2022-03-28 RX ORDER — CHOLECALCIFEROL (VITAMIN D3) 25 MCG
1000 TABLET ORAL DAILY
COMMUNITY

## 2022-03-28 RX ORDER — ERGOCALCIFEROL 1.25 MG/1
50000 CAPSULE ORAL
COMMUNITY

## 2022-03-28 RX ORDER — ACETAMINOPHEN 325 MG/1
650 TABLET ORAL
Status: DISCONTINUED | OUTPATIENT
Start: 2022-03-28 | End: 2022-03-28 | Stop reason: HOSPADM

## 2022-03-28 RX ORDER — AMOXICILLIN 250 MG
1 CAPSULE ORAL DAILY
COMMUNITY
End: 2022-03-29 | Stop reason: ALTCHOICE

## 2022-03-28 RX ORDER — ACETAMINOPHEN, DIPHENHYDRAMINE HCL, PHENYLEPHRINE HCL 325; 25; 5 MG/1; MG/1; MG/1
10 TABLET ORAL NIGHTLY
COMMUNITY

## 2022-03-28 RX ORDER — LEVOTHYROXINE SODIUM 25 UG/1
25 TABLET ORAL
COMMUNITY

## 2022-03-28 NOTE — ED PROVIDER NOTES
Encounter Date: 3/28/2022       History     Chief Complaint   Patient presents with    Fall     Patient states he was sitting on the edge of the bed and fell forward. Presents awake, alert with c/o neck and shoulder pain.      81-year-old male presents to ED via EMS from [facility] after fall that occurred this morning.  Patient reports he regularly has to sit up on edge of bed to relieve his chronic lower back pain.  He reports after sitting on bed he dozed off and fell forward, contusing head against ground.  Denies LOC.  No use of blood thinners.  He is quickly assisted back to standing position, reporting pain primarily to left shoulder.  He denies any associated headache, lightheadedness or dizziness, nausea, vomiting, acute visual changes, chest pain, abdominal pain or other reported injuries.  No other acute complaints at this time.    The history is provided by the patient.     Review of patient's allergies indicates:  No Known Allergies  Past Medical History:   Diagnosis Date    Diabetes mellitus, type 2     High cholesterol     History of hepatitis C; S/p RX with SVR 23 documented 07/2017 7/18/2017    Hypertension      Past Surgical History:   Procedure Laterality Date    CHOLECYSTECTOMY      CORONARY STENT PLACEMENT      EXPLORATORY LAPAROTOMY W/ BOWEL RESECTION  09/19/2016    ileocecectomy    LYSIS OF ADHESIONS N/A 9/5/2018    Procedure: LYSIS, ADHESIONS;  Surgeon: Ariella Lerner DO;  Location: [facility] OR;  Service: General;  Laterality: N/A;    OMENTECTOMY N/A 9/5/2018    Procedure: OMENTECTOMY;  Surgeon: Ariella Lerner DO;  Location: [facility] OR;  Service: General;  Laterality: N/A;     No family history on file.  Social History     Tobacco Use    Smoking status: Current Every Day Smoker     Packs/day: 0.50     Types: Cigarettes    Smokeless tobacco: Never Used    Tobacco comment: APPROX 3 CIGARETTES A DAY   Substance Use Topics    Alcohol use: No    Drug use: Yes     Types:  Marijuana     Comment: medical     Review of Systems   Constitutional: Negative for chills and fever.   Eyes: Negative for visual disturbance.   Respiratory: Negative for cough and shortness of breath.    Cardiovascular: Negative for chest pain.   Gastrointestinal: Negative for abdominal pain, nausea and vomiting.   Musculoskeletal: Positive for arthralgias, back pain (Chronic, unchanged) and neck pain. Negative for neck stiffness.   Skin: Positive for color change.   Neurological: Negative for dizziness, weakness, light-headedness, numbness and headaches.       Physical Exam     Initial Vitals [03/28/22 0920]   BP Pulse Resp Temp SpO2   (!) 148/64 68 16 97.6 °F (36.4 °C) 99 %      MAP       --         Physical Exam    Nursing note and vitals reviewed.  Constitutional: He appears well-developed and well-nourished. He is cooperative. He does not have a sickly appearance. He does not appear ill. No distress.   HENT:   Head: Normocephalic.       Nose: Nose normal.   Mouth/Throat: Oropharynx is clear and moist.   Contusion location to left frontal scalp.  Palpable hematoma.  No overlying abrasion or laceration.  Mild tenderness to touch.   Eyes: EOM are normal.   Right eye cataract     Neck: Neck supple.       Pinpoint tenderness to left trapezius.  No midline cervical tenderness or bony palpable step-offs.  No overlying skin changes or bruising.   Normal range of motion.  Cardiovascular: Normal rate.   Pulmonary/Chest: Effort normal.   No chest wall tenderness   Abdominal: Abdomen is soft. There is no abdominal tenderness.   Musculoskeletal:         General: Tenderness present.      Cervical back: Normal range of motion and neck supple.      Comments: Left shoulder tenderness reported over the posterior aspect.  No physical or palpable deformities.  ROM limited, but after reported baseline due to arthritis .  Distal sensations intact with equal  strength bilaterally and appropriate radial pulses.      Neurological: He is alert and oriented to person, place, and time. GCS score is 15. GCS eye subscore is 4. GCS verbal subscore is 5. GCS motor subscore is 6.   Skin: Skin is warm and dry.   Psychiatric: He has a normal mood and affect. His speech is normal and behavior is normal. Thought content normal.         ED Course   Procedures  Labs Reviewed   POCT GLUCOSE - Abnormal; Notable for the following components:       Result Value    POCT Glucose 126 (*)     All other components within normal limits   POCT GLUCOSE MONITORING CONTINUOUS          Imaging Results          CT Cervical Spine Without Contrast (Final result)  Result time 03/28/22 10:44:26    Final result by David Escoto MD (03/28/22 10:44:26)                 Impression:      1. No acute intracranial findings.  Left frontal scalp hematoma without associated skull fracture.  2. No acute cervical spine fracture.  3. Degenerative findings in the cervical spine, as discussed.  4. Periapical lucency partially imaged about the remaining left maxillary tooth, which could relate to periapical abscess.  Clinical correlation recommended.      Electronically signed by: David Escoto  Date:    03/28/2022  Time:    10:44             Narrative:    EXAMINATION:  CT HEAD WITHOUT CONTRAST; CT CERVICAL SPINE WITHOUT CONTRAST    CLINICAL HISTORY:  Head trauma, minor (Age >= 65y);; Neck trauma (Age >= 65y);    TECHNIQUE:  Low dose axial images were obtained through the head and cervical spine.  Coronal and sagittal reformations were also performed. Contrast was not administered.    COMPARISON:  CT head performed 11/22/2018    FINDINGS:  Head:    Blood: No acute intracranial hemorrhage.    Parenchyma: No definite loss of gray-white differentiation to suggest acute or subacute transcortical infarct. Generalized pattern age-related parenchymal volume loss.  Nonspecific areas of white matter hypoattenuation may relate to sequela of chronic small vessel ischemic disease.   Remote lacunar type infarcts are suggested in the region of the right basal ganglia.    Ventricles/Extra-axial spaces: No abnormal extra-axial fluid collection. Basal cisterns are patent.    Vessels: Vascular calcifications.    Orbits: Right lens replacement versus thinning.    Scalp: Large left frontal scalp hematoma with surrounding soft tissue contusion extending to the supraorbital soft tissues.    Skull: There are no depressed skull fractures or destructive bone lesions.    Sinuses and mastoids: Scattered paranasal sinus mucosal thickening.    Other findings: Medialized left lamina papyracea of may represent sequela of remote trauma.  Similar mild tethered appearance of the left medial rectus muscle. Question chronic posttraumatic deformity of the left zygomatic arch.  Soft tissue attenuation within the external auditory canals may relate to cerumen.  Periapical lucency is suggested involving the remaining left maxillary tooth.    Cervical spine:    Fractures: No acute fractures    Alignment: Minor grade 1 anterolisthesis of C4-C5 and of C7-T1.  Atlanto-axial and atlanto-occipital joints: Atlanto-axial and atlanto-occipital intervals are not widened.  Facet joints: There is no traumatic facet joint widening.  Multilevel degenerative facet arthropathy.  Vertebral bodies: Osseous demineralization.  Multilevel extensive degenerative endplate change.  Discs: Disc osteophyte complex formation.  Spinal canal and foraminal narrowing: Although CT does not optimally evaluate the soft tissue contents of the spinal canal and foramina, no critical stenosis is suggested.    At C4-5, overall moderate spinal canal stenosis eccentric to the left and mild right and moderate left foraminal stenosis.    At C5-6, overall mild ventral thecal sac deformity and moderate bilateral foraminal stenosis.    At C6-7, mild left foraminal stenosis.      Paraspinal soft tissues: Unremarkable.    Upper Lungs:Motion, sized assessment of the  lungs demonstrates suggested emphysematous changes.                               CT Head Without Contrast (Final result)  Result time 03/28/22 10:44:26    Final result by David Escoto MD (03/28/22 10:44:26)                 Impression:      1. No acute intracranial findings.  Left frontal scalp hematoma without associated skull fracture.  2. No acute cervical spine fracture.  3. Degenerative findings in the cervical spine, as discussed.  4. Periapical lucency partially imaged about the remaining left maxillary tooth, which could relate to periapical abscess.  Clinical correlation recommended.      Electronically signed by: David Escoto  Date:    03/28/2022  Time:    10:44             Narrative:    EXAMINATION:  CT HEAD WITHOUT CONTRAST; CT CERVICAL SPINE WITHOUT CONTRAST    CLINICAL HISTORY:  Head trauma, minor (Age >= 65y);; Neck trauma (Age >= 65y);    TECHNIQUE:  Low dose axial images were obtained through the head and cervical spine.  Coronal and sagittal reformations were also performed. Contrast was not administered.    COMPARISON:  CT head performed 11/22/2018    FINDINGS:  Head:    Blood: No acute intracranial hemorrhage.    Parenchyma: No definite loss of gray-white differentiation to suggest acute or subacute transcortical infarct. Generalized pattern age-related parenchymal volume loss.  Nonspecific areas of white matter hypoattenuation may relate to sequela of chronic small vessel ischemic disease.  Remote lacunar type infarcts are suggested in the region of the right basal ganglia.    Ventricles/Extra-axial spaces: No abnormal extra-axial fluid collection. Basal cisterns are patent.    Vessels: Vascular calcifications.    Orbits: Right lens replacement versus thinning.    Scalp: Large left frontal scalp hematoma with surrounding soft tissue contusion extending to the supraorbital soft tissues.    Skull: There are no depressed skull fractures or destructive bone lesions.    Sinuses and mastoids:  Scattered paranasal sinus mucosal thickening.    Other findings: Medialized left lamina papyracea of may represent sequela of remote trauma.  Similar mild tethered appearance of the left medial rectus muscle. Question chronic posttraumatic deformity of the left zygomatic arch.  Soft tissue attenuation within the external auditory canals may relate to cerumen.  Periapical lucency is suggested involving the remaining left maxillary tooth.    Cervical spine:    Fractures: No acute fractures    Alignment: Minor grade 1 anterolisthesis of C4-C5 and of C7-T1.  Atlanto-axial and atlanto-occipital joints: Atlanto-axial and atlanto-occipital intervals are not widened.  Facet joints: There is no traumatic facet joint widening.  Multilevel degenerative facet arthropathy.  Vertebral bodies: Osseous demineralization.  Multilevel extensive degenerative endplate change.  Discs: Disc osteophyte complex formation.  Spinal canal and foraminal narrowing: Although CT does not optimally evaluate the soft tissue contents of the spinal canal and foramina, no critical stenosis is suggested.    At C4-5, overall moderate spinal canal stenosis eccentric to the left and mild right and moderate left foraminal stenosis.    At C5-6, overall mild ventral thecal sac deformity and moderate bilateral foraminal stenosis.    At C6-7, mild left foraminal stenosis.      Paraspinal soft tissues: Unremarkable.    Upper Lungs:Motion, sized assessment of the lungs demonstrates suggested emphysematous changes.                               X-Ray Shoulder Trauma Left (Final result)  Result time 03/28/22 10:17:42    Final result by David Escoto MD (03/28/22 10:17:42)                 Impression:      No convincing evidence of acute fracture or dislocation.    Nonspecific interstitial coarsening noted at the left lung. Nonspecific retrocardiac opacity.      Electronically signed by: David Escoto  Date:    03/28/2022  Time:    10:17             Narrative:     EXAMINATION:  XR SHOULDER TRAUMA 3 VIEW LEFT    CLINICAL HISTORY:  Unspecified fall, initial encounter    TECHNIQUE:  Three views of the left shoulder were performed.    COMPARISON  None    FINDINGS:  Osseous demineralization.  No convincing evidence of acute fracture or dislocation.  Degenerative changes are noted at the AC joint.  No definite radiopaque foreign body visualized.    Nonspecific interstitial coarsening noted at the left lung.  Nonspecific retrocardiac opacity.                                 Medications   acetaminophen tablet 650 mg (650 mg Oral Not Given 3/28/22 1000)     Medical Decision Making:   Initial Assessment:   Patient presents with concern of left-sided shoulder pain and neck pain after accidentally falling out of bed this morning.  Denies any associated headaches, acute visual changes, lightheadedness or dizziness, syncope.  Quickly assisted back to seated position.  On exam, hematoma own noted to left frontal scalp with no open wounds.  Tenderness primarily to left trapezius extending to left posterior shoulder.  No physical or palpable deformities.  No neurological deficits.  Differential Diagnosis:   Scalp contusion, concussion, laceration, skull fracture,diffuse axonal injury, countercoup injury, intracranial hemorrhage such as subdural hematoma, subarachnoid hemorrhage or epidural hematoma, cerebral contusion, soft tissue injury, paraspinal or spinal injury, strain, sprain, dislocation, fracture    ED Management:  X-ray left shoulder, CT head and cervical spine    X-rays of left shoulder showing arthritic changes but no acute bony abnormalities.  CT head negative for acute skull or intracranial abnormalities.  CT cervical spine negative for acute fracture.    Patient otherwise well-appearing in ED.  Will continue with conservative care.  Encouraged Tylenol, ice/heat, activities and movements as tolerated, PCP follow-up.  ED return precautions were discussed at length.  Patient  states his understanding and agrees with plan.                      Clinical Impression:   Final diagnoses:  [W19.XXXA] Fall  [S00.03XA] Contusion of scalp, initial encounter (Primary)  [M25.512] Acute pain of left shoulder          ED Disposition Condition    Discharge Stable        ED Prescriptions     None        Follow-up Information     Follow up With Specialties Details Why Contact Info    Martha Jamison MD Internal Medicine Schedule an appointment as soon as possible for a visit   [address] [place] [address]  [address]  [facility] [address]  [phone]             Isrrael Chacon PA-C  03/28/22 1128

## 2022-03-28 NOTE — DISCHARGE INSTRUCTIONS

## 2022-03-29 ENCOUNTER — HOSPITAL ENCOUNTER (EMERGENCY)
Facility: HOSPITAL | Age: 82
Discharge: HOME | End: 2022-03-29
Attending: EMERGENCY MEDICINE
Payer: MEDICARE

## 2022-03-29 VITALS
RESPIRATION RATE: 20 BRPM | BODY MASS INDEX: 23.62 KG/M2 | OXYGEN SATURATION: 93 % | WEIGHT: 165 LBS | TEMPERATURE: 98 F | SYSTOLIC BLOOD PRESSURE: 165 MMHG | DIASTOLIC BLOOD PRESSURE: 82 MMHG | HEART RATE: 76 BPM | HEIGHT: 70 IN

## 2022-03-29 DIAGNOSIS — S16.1XXD STRAIN OF NECK MUSCLE, SUBSEQUENT ENCOUNTER: Primary | ICD-10-CM
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
## 2022-03-29 LAB — POCT GLUCOSE: 165 MG/DL (ref 70–110)

## 2022-03-29 PROCEDURE — 99284 EMERGENCY DEPT VISIT MOD MDM: CPT | Mod: 25

## 2022-03-29 PROCEDURE — 93010 ELECTROCARDIOGRAM REPORT: CPT | Mod: ,,, | Performed by: INTERNAL MEDICINE

## 2022-03-29 PROCEDURE — 93005 ELECTROCARDIOGRAM TRACING: CPT

## 2022-03-29 PROCEDURE — 96372 THER/PROPH/DIAG INJ SC/IM: CPT | Performed by: NURSE PRACTITIONER

## 2022-03-29 PROCEDURE — 93010 EKG 12-LEAD: ICD-10-PCS | Mod: ,,, | Performed by: INTERNAL MEDICINE

## 2022-03-29 PROCEDURE — 82962 GLUCOSE BLOOD TEST: CPT

## 2022-03-29 PROCEDURE — 63600175 PHARM REV CODE 636 W HCPCS: Performed by: NURSE PRACTITIONER

## 2022-03-29 PROCEDURE — 25000003 PHARM REV CODE 250: Performed by: NURSE PRACTITIONER

## 2022-03-29 RX ORDER — LIDOCAINE 50 MG/G
1 PATCH TOPICAL
Status: DISCONTINUED | OUTPATIENT
Start: 2022-03-29 | End: 2022-03-29 | Stop reason: HOSPADM

## 2022-03-29 RX ORDER — TAMSULOSIN HYDROCHLORIDE 0.4 MG/1
0.8 CAPSULE ORAL NIGHTLY
COMMUNITY
Start: 2021-10-27 | End: 2022-10-27

## 2022-03-29 RX ORDER — ATORVASTATIN CALCIUM 10 MG/1
10 TABLET, FILM COATED ORAL DAILY
COMMUNITY
Start: 2022-03-14

## 2022-03-29 RX ORDER — SENNOSIDES 8.6 MG/1
2 TABLET ORAL NIGHTLY
COMMUNITY
Start: 2021-10-26 | End: 2022-10-26

## 2022-03-29 RX ORDER — METHOCARBAMOL 750 MG/1
750 TABLET, FILM COATED ORAL 3 TIMES DAILY PRN
Qty: 15 TABLET | Refills: 0 | Status: SHIPPED | OUTPATIENT
Start: 2022-03-29 | End: 2022-04-03

## 2022-03-29 RX ORDER — HYDRALAZINE HYDROCHLORIDE 50 MG/1
50 TABLET, FILM COATED ORAL 3 TIMES DAILY
COMMUNITY
Start: 2021-10-26 | End: 2022-10-26

## 2022-03-29 RX ORDER — LIDOCAINE 50 MG/G
1 PATCH TOPICAL DAILY
Qty: 5 PATCH | Refills: 0 | Status: SHIPPED | OUTPATIENT
Start: 2022-03-29

## 2022-03-29 RX ORDER — HYDROCODONE BITARTRATE AND ACETAMINOPHEN 5; 325 MG/1; MG/1
1 TABLET ORAL
Status: COMPLETED | OUTPATIENT
Start: 2022-03-29 | End: 2022-03-29

## 2022-03-29 RX ORDER — INSULIN LISPRO 100 [IU]/ML
INJECTION, SOLUTION INTRAVENOUS; SUBCUTANEOUS
COMMUNITY
Start: 2021-10-19 | End: 2022-03-29

## 2022-03-29 RX ORDER — DIAZEPAM 10 MG/2ML
5 INJECTION INTRAMUSCULAR
Status: COMPLETED | OUTPATIENT
Start: 2022-03-29 | End: 2022-03-29

## 2022-03-29 RX ADMIN — HYDROCODONE BITARTRATE AND ACETAMINOPHEN 1 TABLET: 5; 325 TABLET ORAL at 04:03

## 2022-03-29 RX ADMIN — DIAZEPAM 5 MG: 5 INJECTION, SOLUTION INTRAMUSCULAR; INTRAVENOUS at 10:03

## 2022-03-29 RX ADMIN — LIDOCAINE 1 PATCH: 50 PATCH CUTANEOUS at 10:03

## 2022-03-29 NOTE — PHARMACY MED REC
"[facility]           Pharmacy  Admission Medication History     The home medication history was taken by Phuong Hoffmann.      Medication history obtained from Medications listed below were obtained from: Nursing home    Based on information gathered for medication list, you may go to "Admission" then "Reconcile Home Medications" tabs to review and/or act upon those items.      The home medication list has been updated by the Pharmacy department.    Please read ALL comments highlighted in yellow.    Please address this information as you see fit.     Feel free to contact us if you have any questions or require assistance.    The medications listed below were removed from the home medication list.  Please reorder if appropriate:     Patient reports NOT TAKING the following medication(s):  o Tylenol 325mg  o maalox  o humalog kwikpen 100unit/ml        Current Facility-Administered Medications on File Prior to Encounter   Medication Dose Route Frequency Provider Last Rate Last Admin    [DISCONTINUED] acetaminophen tablet 650 mg  650 mg Oral ED 1 Time Isrrael Chacon PA-C         Current Outpatient Medications on File Prior to Encounter   Medication Sig Dispense Refill    amLODIPine (NORVASC) 5 MG tablet Take 5 mg by mouth once daily.      atorvastatin (LIPITOR) 10 MG tablet Take 10 mg by mouth once daily.      ergocalciferol (ERGOCALCIFEROL) 50,000 unit Cap Take 50,000 Units by mouth every 7 days.      hydrALAZINE (APRESOLINE) 50 MG tablet Take 50 mg by mouth 3 (three) times daily.      levothyroxine (SYNTHROID) 25 MCG tablet Take 25 mcg by mouth before breakfast.      melatonin 10 mg Tab Take 10 mg by mouth nightly.      senna (SENOKOT) 8.6 mg tablet Take 2 tablets by mouth nightly.      sodium zirconium cyclosilicate (LOKELMA) 10 gram packet Take 10 g by mouth once daily. Mix entire contents of packet(s) into drinking glass containing 3 tablespoons of water; stir well and drink " "immediately. Add water and repeat until no powder remains to receive entire dose.      tamsulosin (FLOMAX) 0.4 mg Cap Take 0.8 mg by mouth every evening.      torsemide (DEMADEX) 20 MG Tab Take 20 mg by mouth every morning.      vitamin D (VITAMIN D3) 1000 units Tab Take 1,000 Units by mouth once daily.      pen needle, diabetic 31 gauge x 5/16" Ndle       [DISCONTINUED] acetaminophen (TYLENOL) 325 MG tablet Take 650 mg by mouth every 4 (four) hours as needed (for mild pain).      [DISCONTINUED] aluminum-magnesium hydroxide-simethicone (MAALOX) 200-200-20 mg/5 mL Susp Take 30 mLs by mouth 2 (two) times daily as needed (heartburn).      [DISCONTINUED] atorvastatin (LIPITOR) 20 MG tablet Take 10 mg by mouth every evening.      [DISCONTINUED] HUMALOG KWIKPEN INSULIN 100 unit/mL pen Inject into the skin.      [DISCONTINUED] hydrALAZINE (APRESOLINE) 50 MG tablet Take 50 mg by mouth every 8 (eight) hours.      [DISCONTINUED] senna-docusate 8.6-50 mg (SENNA WITH DOCUSATE SODIUM) 8.6-50 mg per tablet Take 1 tablet by mouth once daily.      [DISCONTINUED] tamsulosin (FLOMAX) 0.4 mg Cap Take 0.4 mg by mouth every evening.         Please address this information as you see fit.  Feel free to contact us if you have any questions or require assistance.    Phuong Hoffmann  [phone]                  .          "

## 2022-03-29 NOTE — ED PROVIDER NOTES
Source of History:  Patient, chart, EMS    Chief complaint:  Neck Pain (Pt seen at the ED yesterday following a fall that occurred at [facility]. Pt now reporting severe neck pain that began last night. Pt reporting he cannot turn his head right or left or up or down. )      HPI:  Jose Roca is a 81 y.o. male presenting with neck pain after a fall yesterday at his nursing home ([facility]).  Pt states that he was sitting on the edge of his bed and fell asleep and fell off.  Pt was seen in this ED yesterday after the fall. Imaging was unremarkable but states pain worse today. Pt states he is unable to move his head due to pain.  Pt denies taking anything for pain.     This is the extent to the patients complaints today here in the emergency department.    ROS: As per HPI and below:    Constitutional: No fever.  No chills.  Eyes: No visual changes.  ENT: No sore throat. No ear pain    Cardiovascular: No chest pain.  Respiratory: No shortness of breath.  GI: No abdominal pain.  No nausea or vomiting.  Genitourinary: No abnormal urination.  Neurologic: No headache. No focal weakness.  No numbness.  MSK: neck pain  Integument: No rashes or lesions.  Hematologic: No easy bruising.  Endocrine: No excessive thirst or urination.    Review of patient's allergies indicates:  No Known Allergies    PMH:  As per HPI and below:  Past Medical History:   Diagnosis Date    Diabetes mellitus, type 2     High cholesterol     History of hepatitis C; S/p RX with SVR 23 documented 07/2017 7/18/2017    Hypertension      Past Surgical History:   Procedure Laterality Date    CHOLECYSTECTOMY      CORONARY STENT PLACEMENT      EXPLORATORY LAPAROTOMY W/ BOWEL RESECTION  09/19/2016    ileocecectomy    LYSIS OF ADHESIONS N/A 9/5/2018    Procedure: LYSIS, ADHESIONS;  Surgeon: Ariella Lerner DO;  Location: [facility] OR;  Service: General;  Laterality: N/A;    OMENTECTOMY N/A 9/5/2018    Procedure: OMENTECTOMY;  Surgeon:  "Ariella Lerner, ;  Location: [facility] OR;  Service: General;  Laterality: N/A;       Social History     Tobacco Use    Smoking status: Current Every Day Smoker     Packs/day: 0.50     Types: Cigarettes    Smokeless tobacco: Never Used    Tobacco comment: APPROX 3 CIGARETTES A DAY   Substance Use Topics    Alcohol use: No    Drug use: Yes     Types: Marijuana     Comment: medical       Physical Exam:    BP (!) 165/82   Pulse 76   Temp 97.7 °F (36.5 °C) (Oral)   Resp (!) 29   Ht 5' 10" (1.778 m)   Wt 74.8 kg (165 lb)   SpO2 (!) 93%   BMI 23.68 kg/m²     Nursing note and vital signs reviewed.    Constitutional: Distressed due to pain. Nontoxicappearing  Eyes: No conjunctival injection.Extraocular muscles are intact.  ENT: Oropharynx clear.  Normal phonation.   Cardiovascular: Regular rate and rhythm.  No murmurs. No gallops. No rubs  Respiratory: Clear to auscultation bilaterally.  Good air movement.  No wheezes.  No rhonchi. No rales. No accessory muscle use..  Abdomen: Soft.  Not distended.  Nontender.  No guarding.  No rebound. Non-peritoneal.  Musculoskeletal: Good range of motion all joints.  No deformities.  Neck supple.  No meningismus. Bilateral trapezius TTP.  No midline TTP  Skin: No rashes seen.  Good turgor.  No abrasions.  Scattered ecchymoses.  Neurologic: Motor intact.  Sensation intact.  No ataxia. No focal neurological deficits.  Psych: Appropriate, conversant    Labs/Imaging that has been ordered has been reviewed by myself.    I decided to obtain the patient's medical records.  Summary of Medical Records:  Chronic pain, not prescribed anything for pain.     MDM/ Differential Dx:    Emergent evaluation of a 82 yo male presenting for neck pain after a fall yesterday morning.  Pt was seen and evaluated yesterday after the fall with an unremarkable exam.  Pt states pain worsened after leaving the ER.  Pt denies taking anything for the pain.  On exam pt is A&Ox3. VSS. Nonfebrile and " nontoxic appearing. Breath sounds clear bilaterally. Mucous membranes pink and moist.  Bilateral trapezius TTP noted on exam.  No midline TTP.  NO step-offs appreciated on exam.  Decreased ROM of neck.  Pt denies to turn head left and right but can go up and down.   Pt speaking in full sentences.  Steady gait appreciated. Cap refill < 3 seconds.      Differential diagnoses include but are not limited to sprain, strain, fracture, dislocation, contusion, abrasion, others.          ED Course as of 03/29/22 1402   Tue Mar 29, 2022   1346 CT negative for any acute abnormalities.  Patient reassessed.  Patient advised he can use ice or heat for comfort.  Lidoderm patches and Robaxin for pain.  Care rotate with Tylenol.  Patient advised to follow-up with PCP as needed.  Transfer for ordered for transportation back to nursing home. [RZ]   1401 Patient is hemodynamically stable, vital signs are normal. Discharge instructions given. Return to ED precautions discussed. Follow up as directed. Pt verbalized understanding of this plan.  Pt is stable for discharge.  [RZ]      ED Course User Index  [RZ] Jennifer Santiago NP                 Diagnostic Impression:    1. Strain of neck muscle, subsequent encounter    2. Fall, subsequent encounter         ED Disposition Condition    Discharge Stable          ED Prescriptions     Medication Sig Dispense Start Date End Date Auth. Provider    methocarbamoL (ROBAXIN) 750 MG Tab Take 1 tablet (750 mg total) by mouth 3 (three) times daily as needed (muscle strain). 15 tablet 3/29/2022 4/3/2022 Jennifer Santiago NP    LIDOcaine (LIDODERM) 5 % Place 1 patch onto the skin once daily. 5 patch 3/29/2022  Jennifer Santiago NP        Follow-up Information     Follow up With Specialties Details Why Contact Info    Martha Jamison MD Internal Medicine Schedule an appointment as soon as possible for a visit  As needed [address] [facility]  [address]  [facility] [id number] [phone]                Jennifer Santiago, NP  03/29/22 1403

## 2022-03-29 NOTE — ED NOTES
Phan Chapman MD was messaged about ct results and was re-directed to radiologist on call for [place]. Radiologist on call called and no answer; voice message was left. Will continue to monitor.

## 2022-03-29 NOTE — DISCHARGE INSTRUCTIONS
Your imaging shows that you have a possible hematoma near your C3-spine in the subcutaneous fat.  It also appears that you have cervical/trapezius strain.  Please use ice or heat for comfort.  We have prescribed you Robaxin and Lidoderm patch for topical relief.  Follow-up with your PCP as needed.    Our goal in the emergency department is to always give you outstanding care and exceptional service. You may receive a survey by mail or e-mail in the next week regarding your experience in our ED. We would greatly appreciate your completing and returning the survey. Your feedback provides us with a way to recognize our staff who give very good care and it helps us learn how to improve when your experience was below our aspiration of excellence.

## 2022-03-29 NOTE — ED NOTES
Pt went to CT and was unable to perform test d/t increased pain and inability to lay down for exam. Provider notified; will continue to monitor.

## 2022-04-07 DIAGNOSIS — M54.2 NECK PAIN: Primary | ICD-10-CM
